# Patient Record
Sex: FEMALE | Race: WHITE | Employment: FULL TIME | URBAN - METROPOLITAN AREA
[De-identification: names, ages, dates, MRNs, and addresses within clinical notes are randomized per-mention and may not be internally consistent; named-entity substitution may affect disease eponyms.]

---

## 2017-01-13 ENCOUNTER — OFFICE VISIT (OUTPATIENT)
Dept: RHEUMATOLOGY | Age: 37
End: 2017-01-13

## 2017-01-13 VITALS
HEART RATE: 74 BPM | HEIGHT: 63 IN | DIASTOLIC BLOOD PRESSURE: 78 MMHG | BODY MASS INDEX: 30.62 KG/M2 | SYSTOLIC BLOOD PRESSURE: 122 MMHG | WEIGHT: 172.8 LBS | OXYGEN SATURATION: 98 % | TEMPERATURE: 98.2 F | RESPIRATION RATE: 16 BRPM

## 2017-01-13 DIAGNOSIS — Z79.1 NSAID LONG-TERM USE: ICD-10-CM

## 2017-01-13 DIAGNOSIS — M25.552 LEFT HIP PAIN: Primary | ICD-10-CM

## 2017-01-13 DIAGNOSIS — Q79.60 EHLERS-DANLOS SYNDROME: ICD-10-CM

## 2017-01-13 DIAGNOSIS — M25.50 PAIN, JOINT, MULTIPLE SITES: ICD-10-CM

## 2017-01-13 DIAGNOSIS — R59.0 ENLARGED LYMPH NODE IN NECK: ICD-10-CM

## 2017-01-13 RX ORDER — SULINDAC 200 MG/1
200 TABLET ORAL
Qty: 60 TAB | Refills: 1 | Status: SHIPPED | OUTPATIENT
Start: 2017-01-13 | End: 2017-02-02 | Stop reason: ALTCHOICE

## 2017-01-13 NOTE — MR AVS SNAPSHOT
Visit Information Date & Time Provider Department Dept. Phone Encounter #  
 1/13/2017  4:00 PM Sharon Romero MD [facility] [id number] Follow-up Instructions Return in about 2 months (around 3/13/2017). Upcoming Health Maintenance Date Due DTaP/Tdap/Td series (1 - Tdap) 10/4/2002 PAP AKA CERVICAL CYTOLOGY 10/4/2002 INFLUENZA AGE 9 TO ADULT 8/1/2016 Allergies as of 1/13/2017  Review Complete On: 1/13/2017 By: Sharon Romero MD  
  
 Severity Noted Reaction Type Reactions Lisinopril  06/18/2014    Swelling Current Immunizations  Reviewed on 11/3/2015 Name Date Influenza Vaccine 10/3/2016, 10/26/2015 Influenza Vaccine Whole 8/23/2010 Not reviewed this visit You Were Diagnosed With   
  
 Codes Comments Left hip pain    -  Primary ICD-10-CM: Z83.045 ICD-9-CM: 719.45 Pain, joint, multiple sites     ICD-10-CM: M25.50 ICD-9-CM: 719.49 Fang-Danlos syndrome     ICD-10-CM: Q79.6 ICD-9-CM: 756.83   
 NSAID long-term use     ICD-10-CM: Z79.1 ICD-9-CM: V58.64 Enlarged lymph node in neck     ICD-10-CM: R59.0 ICD-9-CM: 821. 6 Vitals BP Pulse Temp Resp Height(growth percentile) Weight(growth percentile) 122/78 (BP 1 Location: Right arm, BP Patient Position: Sitting) 74 98.2 °F (36.8 °C) (Oral) 16 5' 3\" (1.6 m) 172 lb 12.8 oz (78.4 kg) LMP SpO2 BMI OB Status Smoking Status 12/23/2016 98% 30.61 kg/m2 Having regular periods Never Smoker Vitals History BMI and BSA Data Body Mass Index Body Surface Area  
 30.61 kg/m 2 1.87 m 2 Preferred Pharmacy Pharmacy Name Phone [address], [address] [place] [address] [place] [phone] Your Updated Medication List  
  
   
This list is accurate as of: 1/13/17  4:41 PM.  Always use your most recent med list.  
  
  
  
  
 albuterol 90 mcg/actuation inhaler Commonly known as:  PROVENTIL HFA, VENTOLIN HFA, PROAIR HFA Take 2 Puffs by inhalation every six (6) hours as needed for Wheezing. ATIVAN 0.5 mg tablet Generic drug:  LORazepam  
Take 0.5 mg by mouth as needed for Anxiety. fexofenadine 180 mg tablet Commonly known as:  Macie Naegeli Take 1 Tab by mouth daily. FIORICET PO Take  by mouth as needed. fluticasone 50 mcg/actuation nasal spray Commonly known as:  Laura Ashford 2 Sprays by Both Nostrils route daily. Indications: ALLERGIC RHINITIS MAXALT PO Take  by mouth.  
  
 metoprolol tartrate 25 mg tablet Commonly known as:  LOPRESSOR Take 1 Tab by mouth two (2) times a day. omeprazole 40 mg capsule Commonly known as:  PRILOSEC Take 40 mg by mouth daily. ORTHO TRI-CYCLEN (28) 0.18/0.215/0.25 mg-35 mcg (28) Tab Generic drug:  norgestimate-ethinyl estradiol Take 1 Tab by mouth daily. sertraline 50 mg tablet Commonly known as:  ZOLOFT Take 50 mg by mouth daily. sulindac 200 mg tablet Commonly known as:  CLINORIL Take 1 Tab by mouth two (2) times daily as needed. verapamil  mg capsule Commonly known as:  VERELAN PM  
Take  by mouth nightly. Prescriptions Sent to Pharmacy Refills  
 sulindac (CLINORIL) 200 mg tablet 1 Sig: Take 1 Tab by mouth two (2) times daily as needed. Class: Normal  
 Pharmacy: [address] [place] [address] Ph #: [phone] Route: Oral  
  
We Performed the Following CBC WITH AUTOMATED DIFF [18429 CPT(R)] METABOLIC PANEL, COMPREHENSIVE [86962 CPT(R)] REFERRAL TO PHYSICAL THERAPY [WKT30 Custom] Comments:  
 Gentle topical analgesia over affected area(s). Stability and strengthening exercises as tolerated. Provide patient with a home education program.  
 SED RATE (ESR) [id number] CPT(R)] Follow-up Instructions Return in about 2 months (around 3/13/2017). To-Do List   
 01/13/2017 Imaging:  XR HIP LT W OR WO PELV 2-3 VWS Referral Information Referral ID Referred By Referred To  
  
 [id number] Moreno Perez, [address] [facility], [address] Phone: [phone] Visits Status Start Date End Date 12 New Request 1/13/17 1/13/18 If your referral has a status of pending review or denied, additional information will be sent to support the outcome of this decision. Patient Instructions PT Sulindac twice daily for 2 weeks, then twice daily if needed (no ibuprofen with this) If locking or clicking of hip persistently, review with Dr. Cary Williamson If persistent presence of lymph node, review with PCP Introducing [address]! Dear Kita Purcell: Thank you for requesting a giddy account. Our records indicate that you already have an active giddy account. You can access your account anytime at [email or web address] Did you know that you can access your hospital and ER discharge instructions at any time in giddy? You can also review all of your test results from your hospital stay or ER visit. Additional Information If you have questions, please visit the Frequently Asked Questions section of the giddy website at [email or web address] Remember, giddy is NOT to be used for urgent needs. For medical emergencies, dial 911. Now available from your iPhone and Android! Please provide this summary of care documentation to your next provider. Your primary care clinician is listed as Maria Antonia Angel. If you have any questions after today's visit, please call [phone].

## 2017-01-13 NOTE — PROGRESS NOTES
HISTORY OF PRESENT ILLNESS  Steven Patton is a 29 y.o. female. HPI Patient presents for follow up of joint pain and [place]. This is her first visit in about one year. \"I'm having a lot of pain. \" Symptoms have been present since February and are worsening since August. She has severe left hip pain (mild right hip pain). . She has pain in the buttocks region bilaterally as well. She has persistent pain in the left foot, as well as pain in the shoulders. She had a left hip injection without benefit. The left hip \"clicks when I walk. \" It is difficult to get comfortable in bed and get out of a chair. She has AM stiffness of 30 minutes. She has no joint swelling. She has taken ibuprofen 600 mg once daily with some relief. Current Outpatient Prescriptions   Medication Sig Dispense Refill    omeprazole (PRILOSEC) 40 mg capsule Take 40 mg by mouth daily.  LORazepam (ATIVAN) 0.5 mg tablet Take 0.5 mg by mouth as needed for Anxiety.  norgestimate-ethinyl estradiol (ORTHO TRI-CYCLEN, 28,) 0.18/0.215/0.25 mg-35 mcg (28) tablet Take 1 Tab by mouth daily.  BUTALB/ACETAMINOPHEN/CAFFEINE (FIORICET PO) Take  by mouth as needed.  sertraline (ZOLOFT) 50 mg tablet Take 50 mg by mouth daily.  RIZATRIPTAN BENZOATE (MAXALT PO) Take  by mouth.  albuterol (PROVENTIL HFA, VENTOLIN HFA, PROAIR HFA) 90 mcg/actuation inhaler Take 2 Puffs by inhalation every six (6) hours as needed for Wheezing. 1 Inhaler 0    verapamil ER (VERELAN PM) 300 mg capsule Take  by mouth nightly.  fluticasone (FLONASE) 50 mcg/actuation nasal spray 2 Sprays by Both Nostrils route daily. Indications: ALLERGIC RHINITIS 1 Bottle 12    fexofenadine (ALLEGRA) 180 mg tablet Take 1 Tab by mouth daily. 30 Tab 12    metoprolol (LOPRESSOR) 25 mg tablet Take 1 Tab by mouth two (2) times a day. 60 Tab 0     Allergies   Allergen Reactions    Lisinopril Swelling       Review of Systems   Constitutional: Negative for fever.    Eyes: Negative for blurred vision. Cardiovascular: Negative for leg swelling. Gastrointestinal: Negative for abdominal pain. Skin: Negative for rash. Physical Exam   Vitals reviewed. Constitutional: She is oriented to person, place, and time. She appears well-developed and well-nourished. No distress. Neck: Normal range of motion. Neck supple. No thyromegaly present. 1-2 mm moveable, tender lymph node right anterior cervical triangle (inferior aspect)  Cardiovascular:   No edema   Abdominal: Soft. She exhibits no distension. There is no tenderness. Musculoskeletal:   -peripheral joints FROM (painful IR, flexion, ER left hip). Thumbs extension to forearm. LS flexion with palms to floor (with pain). -Full peripheral joint examination reveals no synovitis. Tenderness left shoulder, right knee, and over left greater trochanter  Neurological: She is alert and oriented to person, place, and time. She exhibits normal muscle tone.   -gait normal  Skin: Skin is warm and dry. No rash noted. No erythema.   -skin with normal texture   Psychiatric: She has a normal mood and affect.  Judgment normal    Lab Results   Component Value Date/Time    WBC 6.4 11/13/2015 04:34 PM    Hemoglobin (POC) 10.5 11/23/2010 02:35 PM    HGB 13.3 11/13/2015 04:34 PM    Hematocrit (POC) 31 11/23/2010 02:35 PM    HCT 39.8 11/13/2015 04:34 PM    PLATELET 827 12/29/1685 04:34 PM    MCV 86.5 11/13/2015 04:34 PM     Lab Results   Component Value Date/Time    Sodium 138 11/13/2015 04:34 PM    Potassium 3.7 11/13/2015 04:34 PM    Chloride 105 11/13/2015 04:34 PM    CO2 26 11/13/2015 04:34 PM    Anion gap 7 11/13/2015 04:34 PM    Glucose 95 11/13/2015 04:34 PM    BUN 12 11/13/2015 04:34 PM    Creatinine 0.88 11/13/2015 04:34 PM    BUN/Creatinine ratio 14 11/13/2015 04:34 PM    GFR est AA >60 11/13/2015 04:34 PM    GFR est non-AA >60 11/13/2015 04:34 PM    Calcium 8.6 11/13/2015 04:34 PM    Bilirubin, total 0.4 11/13/2015 04:34 PM    ALT 24 11/13/2015 04:34 PM    AST 14 11/13/2015 04:34 PM    Alk. phosphatase 75 11/13/2015 04:34 PM    Protein, total 7.4 11/13/2015 04:34 PM    Albumin 3.8 11/13/2015 04:34 PM    Globulin 3.6 11/13/2015 04:34 PM    A-G Ratio 1.1 11/13/2015 04:34 PM         ASSESSMENT and PLAN    ICD-10-CM ICD-9-CM    1. Left hip pain: noted over the past year. Perhaps some mechanical symptoms. No relief from intra-articular injection. M25.552 719.45 XR HIP LT W OR WO PELV 2-3 VWS  -sulindac as noted below  -if not improving or if significant mechanical symptoms persist, review with Dr. Beti Gar      SED RATE (ESR)      REFERRAL TO PHYSICAL THERAPY   2. Pain, joint, multiple sites: though left hip pain predominates, diffuse pain more prominent of late. No clear synovitis on exam. Joints with FROM. 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 Evaluated at [address] [place] [address] and diagnosed with EDS (non-vascular type). She has previously reported a normal echocardiogram (sees cardiology as well). Q79.6 756.83 REFERRAL TO PHYSICAL THERAPY  Avoid pushing ROM in day-to day or PT exercises (keep joint protection program)   4. NSAID long-term use: intermittent ibuprofen with current RX for sulindac G31.8 M30.88 METABOLIC PANEL, COMPREHENSIVE      CBC WITH AUTOMATED DIFF  -already taking PPI   5. Enlarged lymph node in neck: right sided; recent onset; likely reactive.  R59.0 785.6 -monitor and review with PCP if persistent, more painful, or increasing size of lymph node

## 2017-01-13 NOTE — PATIENT INSTRUCTIONS
PT    Sulindac twice daily for 2 weeks, then twice daily if needed (no ibuprofen with this)    If locking or clicking of hip persistently, review with Dr. Cary Williamson    If persistent presence of lymph node, review with PCP

## 2017-01-14 LAB
ALBUMIN SERPL-MCNC: 4.1 G/DL (ref 3.5–5.5)
ALBUMIN/GLOB SERPL: 1.6 {RATIO} (ref 1.1–2.5)
ALP SERPL-CCNC: 69 IU/L (ref 39–117)
ALT SERPL-CCNC: 14 IU/L (ref 0–32)
AST SERPL-CCNC: 10 IU/L (ref 0–40)
BASOPHILS # BLD AUTO: 0 X10E3/UL (ref 0–0.2)
BASOPHILS NFR BLD AUTO: 0 %
BILIRUB SERPL-MCNC: <0.2 MG/DL (ref 0–1.2)
BUN SERPL-MCNC: 11 MG/DL (ref 6–20)
BUN/CREAT SERPL: 17 (ref 8–20)
CALCIUM SERPL-MCNC: 10.7 MG/DL (ref 8.7–10.2)
CHLORIDE SERPL-SCNC: 99 MMOL/L (ref 96–106)
CO2 SERPL-SCNC: 25 MMOL/L (ref 18–29)
CREAT SERPL-MCNC: 0.65 MG/DL (ref 0.57–1)
EOSINOPHIL # BLD AUTO: 0.1 X10E3/UL (ref 0–0.4)
EOSINOPHIL NFR BLD AUTO: 1 %
ERYTHROCYTE [DISTWIDTH] IN BLOOD BY AUTOMATED COUNT: 12.7 % (ref 12.3–15.4)
ERYTHROCYTE [SEDIMENTATION RATE] IN BLOOD BY WESTERGREN METHOD: 6 MM/HR (ref 0–32)
GLOBULIN SER CALC-MCNC: 2.6 G/DL (ref 1.5–4.5)
GLUCOSE SERPL-MCNC: 92 MG/DL (ref 65–99)
HCT VFR BLD AUTO: 37.7 % (ref 34–46.6)
HGB BLD-MCNC: 12.3 G/DL (ref 11.1–15.9)
IMM GRANULOCYTES # BLD: 0 X10E3/UL (ref 0–0.1)
IMM GRANULOCYTES NFR BLD: 0 %
LYMPHOCYTES # BLD AUTO: 1.6 X10E3/UL (ref 0.7–3.1)
LYMPHOCYTES NFR BLD AUTO: 30 %
MCH RBC QN AUTO: 28.3 PG (ref 26.6–33)
MCHC RBC AUTO-ENTMCNC: 32.6 G/DL (ref 31.5–35.7)
MCV RBC AUTO: 87 FL (ref 79–97)
MONOCYTES # BLD AUTO: 0.5 X10E3/UL (ref 0.1–0.9)
MONOCYTES NFR BLD AUTO: 10 %
NEUTROPHILS # BLD AUTO: 3.1 X10E3/UL (ref 1.4–7)
NEUTROPHILS NFR BLD AUTO: 59 %
PLATELET # BLD AUTO: 237 X10E3/UL (ref 150–379)
POTASSIUM SERPL-SCNC: 4.8 MMOL/L (ref 3.5–5.2)
PROT SERPL-MCNC: 6.7 G/DL (ref 6–8.5)
RBC # BLD AUTO: 4.34 X10E6/UL (ref 3.77–5.28)
SODIUM SERPL-SCNC: 140 MMOL/L (ref 134–144)
WBC # BLD AUTO: 5.4 X10E3/UL (ref 3.4–10.8)

## 2017-01-16 NOTE — PROGRESS NOTES
Calcium slightly elevated. Hip xray unremarkable. Recheck renal panel in 4-6 weeks. Plan otherwise as discussed at visit.

## 2017-01-18 NOTE — PROGRESS NOTES
Called and spoke with Patient. Informed patient of results/instructions. Patient voiced understanding and agreed to follow Dr. Noella Hamman instructions.

## 2017-01-26 ENCOUNTER — APPOINTMENT (OUTPATIENT)
Dept: PHYSICAL THERAPY | Age: 37
End: 2017-01-26
Payer: COMMERCIAL

## 2017-01-30 ENCOUNTER — HOSPITAL ENCOUNTER (OUTPATIENT)
Dept: PHYSICAL THERAPY | Age: 37
Discharge: HOME | End: 2017-01-30
Payer: COMMERCIAL

## 2017-01-30 PROCEDURE — 97110 THERAPEUTIC EXERCISES: CPT

## 2017-01-30 PROCEDURE — 97014 ELECTRIC STIMULATION THERAPY: CPT

## 2017-01-30 PROCEDURE — 97161 PT EVAL LOW COMPLEX 20 MIN: CPT

## 2017-01-30 NOTE — PROGRESS NOTES
PT INITIAL EVALUATION NOTE 2-15    Patient Name: Isra Quintanilla  Date:2017  : 1980  [x]  Patient  Verified  Payor: Gonzalo Biggs / Plan: Melanie Woor / Product Type: PPO /    In time: 2:05 PM  Out time: 3:30 PM  Total Treatment Time (min): 80  Visit #: 1     Treatment Area: Left hip pain [M25.552]  Fang-Danlos syndrome [Q79.6]    SUBJECTIVE  Pain Level (0-10 scale): 4/10  Any medication changes, allergies to medications, adverse drug reactions, diagnosis change, or new procedure performed?: [] No    [x] Yes (see summary sheet for update)  Subjective:     Date of onset/injury: 1 year prior  Date of surgery: NA  Mechanism of injury:  Insidious onset that is progressively getting worse. No known cause  Current symptoms/chief complaint: Discomfort starts in left low back and goes around into groin, then down left buttocks to mid thigh. Pt reports constant aching, with intermittent shooting sensation which started 2 weeks prior. Pt reports no changes her her daily routine over the past year. Pt states she can have a good day with minimal pain, but all it takes is one miss step and the pain significantly increases. Difficulty with sleeping due to pain. Pt reports it's hard to find a comfortable position. She starts with laying on her back which reduces the pain, but is unable to sleep in that position. Pt denies symptoms distal to knee, denies any tingling or numbness in the groin area and no change in bowel/ bladder. Pt reports no known decrease in muscle strength in lower extremity. Pain:   9/10 max 2/10 min 4/10 now     Aggravated by: Squatting, bending over, lifting, incline/ decline steps. Normal house work. Eased by: Ice initially one year prior, didn't help. Rice heat pack for doesn't seem to help. Ibuprofen helps a little. Recently started a new anti-inflammatory that seems to help.      Previous treatment: Pt reports receiving hip steroid injection summer 2016, helped a little but made her more aware of her LBP. Patient states at the same time she had imaging done by Dr. Darryle Beach who told her she has \"shallow sockets. \"     Diagnostic Tests: [] Lab work [x] X-rays    [] CT [] MRI     [] Other:  Results (per report of the patient): 2 weeks prior, hip Xray. Normal findings. PMHX: Significant for Fang-Danlos syndrome, arthritis, depression, high blood pressure, asthma.   and , gallbladder , cardiac ablation  and . 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 Pt reports being required to wear a lead apron for four hours a day, which weighs 10 pounds. Recreation consists of walking at lunch for 20 min. Home life consists of 2 kids, 5 y.o and 10 y.o. Pt reports she is no longer needs to pick them up on a regular basis. Pt reports that in regards to pain, weekends are no better than work days. Prior level of function: General house work pain free. Running pain free. No restrictions at work. Ascend/ descend 10 stairs without discomfort. Patient goal(s): \"Functional activities with less pain. Don't expect pain free. Daily tasks without excruciating pain. Tighten core muscles. Stairs up and down with discomfort. \"        Objective:      Posture:  Lateral Shift: [x] R    [] L    Minor weight shift away from LLE   Kyphosis: [] Increased [x] Decreased   []  WNL  Lordosis:  [x] Increased [] Decreased   [] WNL  Pelvic symmetry: [] WNL    [x] Other: Standing: Left ASIS superior, left PSIS inferior    Functional Biomechanical Screen  SLS: b/l >10 sec w/o LOB. Bilateral Squat: weight shift to right LE. Report of pain increase. Single Limb Squat: b/l moderate dynamic genu valgus. Pain with left squat. Gait: left side trendelenburg sign with right hip drop. Decreased stance time on left side. Right lateral shift.      Active Movements: [] N/A   [] Too acute   [] Other:  LUMB AROM  AROM (%  of normal) Comments:pain, area   Forward flexion  100 P! Through full ROM   Extension  100 P! at end range   Rotation right 100    Rotation left 100    SB right 100 P! On left side   SB left 100 P! On left side            HIP ROM: B/l PROM WNL. Strength:   [] N/A   [] Too acute   [] Other:   L(0-5) R (0-5)   Hip Flexion (L1,2) 5 5   Hip Abduction 4P! 4   Hip ER 4 4   Hip IR nt nt   Hip Extension 5 5   Glut Extension 4P! 4   Knee Extension (L3,4) 5 5   Knee Flexion (S1,2) 5 5   Ankle Dorsiflexion (L4) 5 5   Great Toe Extension (L5) nt nt   Ankle Plantarflexion (S1) nt nt   Ankle Eversion (S1) nt nt     Abdominal brace: decreased abdominal endurance and overall control. Bridge minor hip rotation, minor discomfort reported. Bridge w/ march x2 with moderate hip rotation/ instability observed. Pelvic symmetry supine: Left ASIS superior, left leg shorter                                 Palpation  [] Min  [x] Mod  [] Severe    Location: Left PSIS nodules/ swelling noted   [] Min  [x] Mod  [] Severe    Location: Left SIJ tender to pressure, hypomobile  [] Min  [x] Mod  [] Severe    Location: Left piriformis tightness/ trigger points and increased tone  No pain reported with P-A mobs in Lumbar spine.      Special Tests  Nicolette Test:  [x] Neg    [] Pos  Trendelenberg:  [] Neg    [x] Pos [x] Left    [] Right  OberTest:   [x] Neg    [] Pos  Ely's Test:  [x] Neg    [] Pos  SIJ Provocation: [] Neg    [x] Pos  Hamstring 90/90 Test: [x] Neg    [] Pos    SLR Supine: [] R    [x] L    [] +    [x] -  minor  Crossed SLR  [] R    [] L    [] +    [x] -    Slump Test: [] R    [x] L    [] +    [] -  minor  Nicolette/fig 4 test: [] R    [] L    [] +    [x] -   SI Compress: [] +    [x] -   SI Gapping:  [x] +    [] -   Sacral Thrust: [] R    [x] L    [x] +    [] -   Hip scour: [] R    [x] L    [x] +    [] -   90/90   [] R    [] L    [] +    [x] -   FADIR negative      Outcome Measure: Patient presents with an initial FOTO score of 57. Modality rationale: decrease edema, decrease inflammation, decrease pain, increase tissue extensibility and increase muscle contraction/control to improve the patients ability to General house work pain free. Running pain free. No restrictions at work. Ascend/ descend 10 stairs without discomfort. Min Type Additional Details   12 [x] Estim: []Att   []Unatt        []TENS instruct                  [x]IFC  []Premod   []NMES                     []Other:  []w/US   [x]w/ice   []w/heat  Position: sidelying  Location: left SIJ    []  Traction: [] Cervical       []Lumbar                       [] Prone          []Supine                       []Intermittent   []Continuous Lbs:  [] before manual  [] after manual  []w/heat    []  Ultrasound: []Continuous   [] Pulsed at:                            []1MHz   []3MHz Location:  W/cm2:    []  Paraffin         Location:  []w/heat    []  Ice     []  Heat  []  Ice massage Position:  Location:    []  Laser  []  Other: Position:  Location:    []  Vasopneumatic Device Pressure:       [] lo [] med [] hi   Temperature:    [x] Skin assessment post-treatment:  [x]intact []redness- no adverse reaction    []redness  adverse reaction:     10 min Therapeutic Exercise:  [x] See flow sheet : Pt instructed in HEP and able to return demonstration. Corrected left pelvic posterior rotation with MET. Rationale: increase ROM, increase strength, improve coordination, improve balance and increase proprioception to improve the patients ability to General house work pain free. Running pain free. No restrictions at work. Ascend/ descend 10 stairs without discomfort. With   [x] TE   [] TA   [] neuro   [] other: Patient Education: [] Review HEP    [] Progressed/Changed HEP based on:   [x] positioning   [x] body mechanics   [x] transfers   [x] heat/ice application    [x] other: Pt encouraged to avoid crossing legs.  Educated on proper sitting/ desk ergonomics.         Other Objective/Functional Measures: nt    Pain Level (0-10 scale) post treatment: 2/10      ASSESSMENT:      [x]  See Plan of Care      Dayton Jarrett 1/30/2017  4:00 PM

## 2017-02-01 RX ORDER — METHOCARBAMOL 500 MG/1
TABLET, FILM COATED ORAL
Qty: 30 TAB | Refills: 0 | Status: SHIPPED | OUTPATIENT
Start: 2017-02-01 | End: 2017-06-03

## 2017-02-01 RX ORDER — METHYLPREDNISOLONE 4 MG/1
TABLET ORAL
Qty: 1 DOSE PACK | Refills: 0 | Status: SHIPPED | OUTPATIENT
Start: 2017-02-01 | End: 2017-06-03

## 2017-02-02 ENCOUNTER — OFFICE VISIT (OUTPATIENT)
Dept: FAMILY MEDICINE CLINIC | Age: 37
End: 2017-02-02

## 2017-02-02 VITALS
OXYGEN SATURATION: 99 % | BODY MASS INDEX: 30.48 KG/M2 | TEMPERATURE: 98.4 F | HEART RATE: 81 BPM | DIASTOLIC BLOOD PRESSURE: 68 MMHG | HEIGHT: 63 IN | RESPIRATION RATE: 18 BRPM | WEIGHT: 172 LBS | SYSTOLIC BLOOD PRESSURE: 109 MMHG

## 2017-02-02 DIAGNOSIS — R68.83 CHILLS: ICD-10-CM

## 2017-02-02 DIAGNOSIS — R11.0 NAUSEA: Primary | ICD-10-CM

## 2017-02-02 LAB
FLUAV+FLUBV AG NOSE QL IA.RAPID: NEGATIVE POS/NEG
FLUAV+FLUBV AG NOSE QL IA.RAPID: NEGATIVE POS/NEG
VALID INTERNAL CONTROL?: YES

## 2017-02-02 NOTE — PROGRESS NOTES
Chief Complaint   Patient presents with    Cold Symptoms     nausea, chills and body aches for 1 day, has taken Tylenol

## 2017-02-02 NOTE — PROGRESS NOTES
Subjective:   Michelle Nicole is a 29 y.o. female who complains of myalgias, chills, and nausea for 1 day, stable since that time. She came here at the recommendation of her supervisor to be evaluated for flu. She denies any sore throat, sneezing, runny nose, cough, or headache. Ears are her normal baseline. History of IBS - has couple weeks of diarrhea then constipation. Has recently had constipation but did have small normal BM last night and this morning. Her  recently had a 24 hours stomach illness w/ vomiting and diarrhea. She has had a flu vaccine this season. She denies a history of fevers, shortness of breath and wheezing. Evaluation to date: none. Treatment to date: OTC products. Patient does not smoke cigarettes. 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     Unspecified adverse effect of anesthesia      with C- section the spinal did not take and had to be put to sleep    Palacios-Parkinson-White syndrome     Marie-Parkinson-White syndrome      Past Surgical History   Procedure Laterality Date    Hx cholecystectomy      Pr  delivery only      Hx tonsillectomy   tonsils    Hx heent  As Child     \"tubes in each ear\" as child    Hx svt ablation       ,     Hx  section       c section x 2     Allergies   Allergen Reactions    Lisinopril Swelling       Review of Systems  Pertinent items are noted in HPI. Objective:     Visit Vitals    /68    Pulse 81    Temp 98.4 °F (36.9 °C) (Oral)    Resp 18    Ht 5' 3\" (1.6 m)    Wt 172 lb (78 kg)    LMP 2016 (Exact Date)    SpO2 99%    BMI 30.47 kg/m2     General:  alert, cooperative, no distress but sickly appearance   Eyes: negative   Ears: normal TM's and external ear canals AU   Sinuses: Normal paranasal sinuses without tenderness   Mouth:  Lips, mucosa, and tongue normal. Teeth and gums normal and normal findings: oropharynx pink & moist without lesions or evidence of thrush   Neck: supple, symmetrical, trachea midline and no adenopathy. Heart: S1 and S2 normal, no murmurs noted. Lungs: clear to auscultation bilaterally   Abdomen: soft, non-tender. Bowel sounds normal. No masses,  no organomegaly        Results for orders placed or performed in visit on 17   AMB POC NENITA INFLUENZA A/B TEST   Result Value Ref Range    VALID INTERNAL CONTROL POC Yes     Influenza A Ag POC Negative Negative Pos/Neg    Influenza B Ag POC Negative Negative Pos/Neg       Assessment/Plan:       ICD-10-CM ICD-9-CM    1. Nausea R11.0 787.02 AMB POC NENITA INFLUENZA A/B TEST   2. Chills R68.83 780.64      Pt declines antiemetic due to concerns for constipation. She is going home for the day. Clear liquids and advance slowly to bland diet. Suggested symptomatic OTC remedies. RTC prn.     Cristina Bonilla NP

## 2017-02-02 NOTE — MR AVS SNAPSHOT
Visit Information Date & Time Provider Department Dept. Phone Encounter #  
 2/2/2017  9:45 AM Dorothy Acosta NP [address] [facility] [phone] [id number] Follow-up Instructions Return if symptoms worsen or fail to improve. Your Appointments 3/15/2017  4:00 PM  
ESTABLISHED PATIENT with Kristy Oscar MD  
Arthritis and [facility]) Appt Note: fu 2 mo  
 [address] [facility] [address]  
[phone]  
  
   
 [address] [phone] Upcoming Health Maintenance Date Due DTaP/Tdap/Td series (1 - Tdap) 10/4/2002 PAP AKA CERVICAL CYTOLOGY 10/4/2002 Allergies as of 2/2/2017  Review Complete On: 2/2/2017 By: Dorothy Acosta NP Severity Noted Reaction Type Reactions Lisinopril  06/18/2014    Swelling Current Immunizations  Reviewed on 11/3/2015 Name Date Influenza Vaccine 10/3/2016, 10/26/2015 Influenza Vaccine Whole 8/23/2010 Not reviewed this visit You Were Diagnosed With   
  
 Codes Comments Nausea    -  Primary ICD-10-CM: R11.0 ICD-9-CM: 787.02 Chills     ICD-10-CM: R68.83 ICD-9-CM: 780.64 Vitals BP Pulse Temp Resp Height(growth percentile) Weight(growth percentile) 109/68 81 98.4 °F (36.9 °C) (Oral) 18 5' 3\" (1.6 m) 172 lb (78 kg) LMP SpO2 BMI OB Status Smoking Status 01/25/2016 (Exact Date) 99% 30.47 kg/m2 Having regular periods Never Smoker Vitals History BMI and BSA Data Body Mass Index Body Surface Area  
 30.47 kg/m 2 1.86 m 2 Preferred Pharmacy Pharmacy Name Phone [facility] [phone] Your Updated Medication List  
  
   
This list is accurate as of: 2/2/17 10:26 AM.  Always use your most recent med list.  
  
  
  
  
 albuterol 90 mcg/actuation inhaler Commonly known as:  PROVENTIL HFA, VENTOLIN HFA, PROAIR HFA  
 Take 2 Puffs by inhalation every six (6) hours as needed for Wheezing. ATIVAN 0.5 mg tablet Generic drug:  LORazepam  
Take 0.5 mg by mouth as needed for Anxiety. fexofenadine 180 mg tablet Commonly known as:  Pamela Holts Take 1 Tab by mouth daily. FIORICET PO Take  by mouth as needed. fluticasone 50 mcg/actuation nasal spray Commonly known as:  Alicia Zelaya 2 Sprays by Both Nostrils route daily. Indications: ALLERGIC RHINITIS MAXALT PO Take  by mouth. methocarbamol 500 mg tablet Commonly known as:  ROBAXIN  
500 mg at bedtime as needed for muscle pain. May make drowsy. Do not take with other muscle relaxants. methylPREDNISolone 4 mg tablet Commonly known as:  Dairl Leah Take each day's dose in AM with food prn joint pain. Do not take with NSAIDS  
  
 metoprolol tartrate 25 mg tablet Commonly known as:  LOPRESSOR Take 1 Tab by mouth two (2) times a day. omeprazole 40 mg capsule Commonly known as:  PRILOSEC Take 40 mg by mouth daily. ORTHO TRI-CYCLEN (28) 0.18/0.215/0.25 mg-35 mcg (28) Tab Generic drug:  norgestimate-ethinyl estradiol Take 1 Tab by mouth daily. sertraline 50 mg tablet Commonly known as:  ZOLOFT Take 50 mg by mouth daily. verapamil  mg capsule Commonly known as:  VERELAN PM  
Take  by mouth nightly. We Performed the Following AMB POC NENITA INFLUENZA A/B TEST [36291 CPT(R)] Follow-up Instructions Return if symptoms worsen or fail to improve. Patient Instructions Nausea and Vomiting: Care Instructions Your Care Instructions When you are nauseated, you may feel weak and sweaty and notice a lot of saliva in your mouth. Nausea often leads to vomiting. Most of the time you do not need to worry about nausea and vomiting, but they can be signs of other illnesses.  
Two common causes of nausea and vomiting are stomach flu and food poisoning. Nausea and vomiting from viral stomach flu will usually start to improve within 24 hours. Nausea and vomiting from food poisoning may last from 12 to 48 hours. The doctor has checked you carefully, but problems can develop later. If you notice any problems or new symptoms, get medical treatment right away. Follow-up care is a key part of your treatment and safety. Be sure to make and go to all appointments, and call your doctor if you are having problems. It's also a good idea to know your test results and keep a list of the medicines you take. How can you care for yourself at home? · To prevent dehydration, drink plenty of fluids, enough so that your urine is light yellow or clear like water. Choose water and other caffeine-free clear liquids until you feel better. If you have kidney, heart, or liver disease and have to limit fluids, talk with your doctor before you increase the amount of fluids you drink. · Rest in bed until you feel better. · When you are able to eat, try clear soups, mild foods, and liquids until all symptoms are gone for 12 to 48 hours. Other good choices include dry toast, crackers, cooked cereal, and gelatin dessert, such as Jell-O. When should you call for help? Call 911 anytime you think you may need emergency care. For example, call if: 
· You passed out (lost consciousness). Call your doctor now or seek immediate medical care if: 
· You have symptoms of dehydration, such as: ¨ Dry eyes and a dry mouth. ¨ Passing only a little dark urine. ¨ Feeling thirstier than usual. 
· You have new or worsening belly pain. · You have a new or higher fever. · You vomit blood or what looks like coffee grounds. Watch closely for changes in your health, and be sure to contact your doctor if: 
· You have ongoing nausea and vomiting. · Your vomiting is getting worse. · Your vomiting lasts longer than 2 days. · You are not getting better as expected. Where can you learn more? Go to http://dorita-nara.info/. Enter 25 [id number] in the search box to learn more about \"Nausea and Vomiting: Care Instructions. \" Current as of: May 27, 2016 Content Version: 11.1 © 6589-1163 [employer]. Care instructions adapted under license by [employer] (which disclaims liability or warranty for this information). If you have questions about a medical condition or this instruction, always ask your healthcare professional. [address] any warranty or liability for your use of this information. Introducing [facility] & HEALTH SERVICES! Dear Stu Wilkins: Thank you for requesting a [employer] account. Our records indicate that you already have an active [employer] account. You can access your account anytime at [email or web address] Did you know that you can access your hospital and ER discharge instructions at any time in [employer]? You can also review all of your test results from your hospital stay or ER visit. Additional Information If you have questions, please visit the Frequently Asked Questions section of the [employer] website at [email or web address] Remember, [employer] is NOT to be used for urgent needs. For medical emergencies, dial 911. Now available from your iPhone and Android! Please provide this summary of care documentation to your next provider. Your primary care clinician is listed as Kamran Logan. If you have any questions after today's visit, please call [phone].

## 2017-02-08 DIAGNOSIS — M25.552 LEFT HIP PAIN: Primary | ICD-10-CM

## 2017-02-09 LAB
ALBUMIN SERPL-MCNC: 4.2 G/DL (ref 3.5–5.5)
ALBUMIN/GLOB SERPL: 1.9 {RATIO} (ref 1.1–2.5)
ALP SERPL-CCNC: 61 IU/L (ref 39–117)
ALT SERPL-CCNC: 22 IU/L (ref 0–32)
AST SERPL-CCNC: 12 IU/L (ref 0–40)
BASOPHILS # BLD AUTO: 0 X10E3/UL (ref 0–0.2)
BASOPHILS NFR BLD AUTO: 0 %
BILIRUB SERPL-MCNC: <0.2 MG/DL (ref 0–1.2)
BUN SERPL-MCNC: 12 MG/DL (ref 6–20)
BUN/CREAT SERPL: 18 (ref 8–20)
CALCIUM SERPL-MCNC: 9.3 MG/DL (ref 8.7–10.2)
CHLORIDE SERPL-SCNC: 98 MMOL/L (ref 96–106)
CO2 SERPL-SCNC: 26 MMOL/L (ref 18–29)
CREAT SERPL-MCNC: 0.67 MG/DL (ref 0.57–1)
EOSINOPHIL # BLD AUTO: 0.1 X10E3/UL (ref 0–0.4)
EOSINOPHIL NFR BLD AUTO: 1 %
ERYTHROCYTE [DISTWIDTH] IN BLOOD BY AUTOMATED COUNT: 12.6 % (ref 12.3–15.4)
ERYTHROCYTE [SEDIMENTATION RATE] IN BLOOD BY WESTERGREN METHOD: 2 MM/HR (ref 0–32)
GLOBULIN SER CALC-MCNC: 2.2 G/DL (ref 1.5–4.5)
GLUCOSE SERPL-MCNC: 85 MG/DL (ref 65–99)
HCT VFR BLD AUTO: 38.5 % (ref 34–46.6)
HGB BLD-MCNC: 12.1 G/DL (ref 11.1–15.9)
IMM GRANULOCYTES # BLD: 0 X10E3/UL (ref 0–0.1)
IMM GRANULOCYTES NFR BLD: 0 %
LYMPHOCYTES # BLD AUTO: 2.3 X10E3/UL (ref 0.7–3.1)
LYMPHOCYTES NFR BLD AUTO: 38 %
MCH RBC QN AUTO: 28.3 PG (ref 26.6–33)
MCHC RBC AUTO-ENTMCNC: 31.4 G/DL (ref 31.5–35.7)
MCV RBC AUTO: 90 FL (ref 79–97)
MONOCYTES # BLD AUTO: 0.4 X10E3/UL (ref 0.1–0.9)
MONOCYTES NFR BLD AUTO: 7 %
NEUTROPHILS # BLD AUTO: 3.3 X10E3/UL (ref 1.4–7)
NEUTROPHILS NFR BLD AUTO: 54 %
PLATELET # BLD AUTO: 234 X10E3/UL (ref 150–379)
POTASSIUM SERPL-SCNC: 4.2 MMOL/L (ref 3.5–5.2)
PROT SERPL-MCNC: 6.4 G/DL (ref 6–8.5)
RBC # BLD AUTO: 4.27 X10E6/UL (ref 3.77–5.28)
SODIUM SERPL-SCNC: 140 MMOL/L (ref 134–144)
WBC # BLD AUTO: 6.1 X10E3/UL (ref 3.4–10.8)

## 2017-02-20 ENCOUNTER — HOSPITAL ENCOUNTER (OUTPATIENT)
Dept: PHYSICAL THERAPY | Age: 37
Discharge: HOME | End: 2017-02-20
Payer: COMMERCIAL

## 2017-02-20 PROCEDURE — 97110 THERAPEUTIC EXERCISES: CPT | Performed by: PHYSICAL THERAPY ASSISTANT

## 2017-02-20 PROCEDURE — 97140 MANUAL THERAPY 1/> REGIONS: CPT | Performed by: PHYSICAL THERAPY ASSISTANT

## 2017-02-20 PROCEDURE — 97014 ELECTRIC STIMULATION THERAPY: CPT | Performed by: PHYSICAL THERAPY ASSISTANT

## 2017-02-20 NOTE — PROGRESS NOTES
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 \" \" I've gained 15 pounds in the past year and I'm frustrated at that, I can't exercise because of the pain. \"    OBJECTIVE    Modality rationale: decrease inflammation, decrease pain and increase tissue extensibility to improve the patients ability to General house work pain free. Running pain free. No restrictions at work. Ascend/ descend 10 stairs without discomfort.     Min Type Additional Details   15 [x] Estim: []Att   [x]Unatt        []TENS instruct                  [x]IFC  []Premod   []NMES                     []Other:  []w/US   [x]w/ice   []w/heat  Position:  Location:    []  Traction: [] Cervical       []Lumbar                       [] Prone          []Supine                       []Intermittent   []Continuous Lbs:  [] before manual  [] after manual  []w/heat    []  Ultrasound: []Continuous   [] Pulsed at:                            []1MHz   []3MHz Location:  W/cm2:    []  Paraffin         Location:  []w/heat    []  Ice     []  Heat  []  Ice massage Position:  Location:    []  Laser  []  Other: Position:  Location:    []  Vasopneumatic Device Pressure:       [] lo [] med [] hi   Temperature:    [x] Skin assessment post-treatment: [x]intact []redness- no adverse reaction    []redness  adverse reaction:     30 min Therapeutic Exercise:  [x] See flow sheet : reviewed HEP, added s/l clamshells with TrA brace   Rationale: increase strength and improve coordination to improve the patients ability to General house work pain free. Running pain free. No restrictions at work. Ascend/ descend 10 stairs without discomfort. 15 min Manual Therapy:  MET to correct L posterior ilial rotation, MET to balance pelvis. STM/TPR L piriformis and glut med muscles in R s/l    Rationale: decrease pain, increase ROM, increase tissue extensibility and decrease trigger points  to improve the patients ability to General house work pain free. Running pain free. No restrictions at work. Ascend/ descend 10 stairs without discomfort. With   [x] TE   [] TA   [] neuro   [] other: Patient Education: [x] Review HEP    [] Progressed/Changed HEP based on:   [] positioning   [] body mechanics   [] transfers   [x] heat/ice application    [] other:      Other Objective/Functional Measures: Severe TTP L piriformis and glut med muscles     Pain Level (0-10 scale) post treatment: 0/10    ASSESSMENT/Changes in Function:   Reviewed HEP and corrections made. Continues to have posterior rotated L ilium but unable to fully resolve due to instability and core/hip weakness but patient reporting relief after MET correction. Overall tolerated session well. Patient will continue to benefit from skilled PT services to modify and progress therapeutic interventions, address functional mobility deficits, address ROM deficits, address strength deficits, analyze and cue movement patterns and instruct in home and community integration to attain remaining goals.      []  See Plan of Care  []  See progress note/recertification  []  See Discharge Summary         Progress towards goals / Updated goals:  nt    PLAN  []  Upgrade activities as tolerated     [x]  Continue plan of care  [] Update interventions per flow sheet       []  Discharge due to:_  []  Other:_      Rochelle Mcelroy PTA 2/20/2017  5:35 PM

## 2017-02-22 ENCOUNTER — HOSPITAL ENCOUNTER (OUTPATIENT)
Dept: PHYSICAL THERAPY | Age: 37
Discharge: HOME | End: 2017-02-22
Payer: COMMERCIAL

## 2017-02-22 PROCEDURE — 97014 ELECTRIC STIMULATION THERAPY: CPT | Performed by: PHYSICAL THERAPY ASSISTANT

## 2017-02-22 PROCEDURE — 97140 MANUAL THERAPY 1/> REGIONS: CPT | Performed by: PHYSICAL THERAPY ASSISTANT

## 2017-02-22 PROCEDURE — 97110 THERAPEUTIC EXERCISES: CPT | Performed by: PHYSICAL THERAPY ASSISTANT

## 2017-02-22 NOTE — PROGRESS NOTES
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 \" States woke up yesterday morning with sharp pain radiating down L buttock region \"That was new for me. \" States she took pain medication last night before bed, applied ice which allowed her to sleep. Reports sleep improved after taking recommendation of using a pillow in R s/l under R ribs. OBJECTIVE    Modality rationale: decrease inflammation, decrease pain and increase tissue extensibility to improve the patients ability to General house work pain free. Running pain free. No restrictions at work. Ascend/ descend 10 stairs without discomfort.     Min Type Additional Details   15 [x] Estim: []Att   [x]Unatt        []TENS instruct                  [x]IFC  []Premod   []NMES                     []Other:  []w/US   [x]w/ice   []w/heat  Position:  Location:    []  Traction: [] Cervical       []Lumbar                       [] Prone          []Supine                       []Intermittent   []Continuous Lbs:  [] before manual  [] after manual  []w/heat    []  Ultrasound: []Continuous   [] Pulsed at:                            []1MHz   []3MHz Location:  W/cm2:    []  Paraffin         Location:  []w/heat    []  Ice     []  Heat  []  Ice massage Position:  Location:    []  Laser  []  Other: Position:  Location:    []  Vasopneumatic Device Pressure:       [] lo [] med [] hi   Temperature:    [x] Skin assessment post-treatment:  [x]intact []redness- no adverse reaction    []redness  adverse reaction:     40 min Therapeutic Exercise:  [x] See flow sheet : added TrA brace with SKFO, Theraband shoulder row, ext and flex with TrA brace. Rationale: increase strength and improve coordination to improve the patients ability to General house work pain free. Running pain free. No restrictions at work. Ascend/ descend 10 stairs without discomfort. 15 min Manual Therapy:  MET to correct L posterior ilial rotation, MET to balance pelvis. STM/TPR L piriformis and glut med muscles in R s/l    Rationale: decrease pain, increase ROM, increase tissue extensibility and decrease trigger points  to improve the patients ability to General house work pain free. Running pain free. No restrictions at work. Ascend/ descend 10 stairs without discomfort. With   [x] TE   [] TA   [] neuro   [] other: Patient Education: [x] Review HEP    [] Progressed/Changed HEP based on:   [] positioning   [] body mechanics   [] transfers   [x] heat/ice application    [] other:      Other Objective/Functional Measures: Mod-Severe TTP L piriformis and glut med muscles     Pain Level (0-10 scale) post treatment: 0/10    ASSESSMENT/Changes in Function:   Challenged with TrA brace with SKFO L>R. Minor cues to maintain neutral lumbar spine during Theraband shoulder series with TrA brace. No reports of pain during exercises. Overall tolerated session well. Patient will continue to benefit from skilled PT services to modify and progress therapeutic interventions, address functional mobility deficits, address ROM deficits, address strength deficits, analyze and cue movement patterns and instruct in home and community integration to attain remaining goals.      []  See Plan of Care  []  See progress note/recertification  []  See Discharge Summary         Progress towards goals / Updated goals:  nt    PLAN  []  Upgrade activities as tolerated     [x]  Continue plan of care  []  Update interventions per flow sheet       []  Discharge due to:_  []  Other:_      Lanette Kay PTA 2/22/2017  4:30 PM

## 2017-02-27 ENCOUNTER — HOSPITAL ENCOUNTER (OUTPATIENT)
Dept: PHYSICAL THERAPY | Age: 37
Discharge: HOME | End: 2017-02-27
Payer: COMMERCIAL

## 2017-02-27 PROCEDURE — 97110 THERAPEUTIC EXERCISES: CPT | Performed by: PHYSICAL THERAPY ASSISTANT

## 2017-02-27 PROCEDURE — 97014 ELECTRIC STIMULATION THERAPY: CPT | Performed by: PHYSICAL THERAPY ASSISTANT

## 2017-02-27 NOTE — PROGRESS NOTES
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 \" Denies increased pain with activity. States she still has SI belt that she purchased last time in PT but has not tried wearing it. She followed up with ortho. specialist; possible L Hip labral tear \"He wants to do an MRI to be sure but thinks because I have shallow sockets, that's where the tear came from. I'm hoping he's wrong. \" States if she has a labral tear she would also need Osteotomy of L hip. OBJECTIVE    Modality rationale: decrease inflammation, decrease pain and increase tissue extensibility to improve the patients ability to General house work pain free. Running pain free. No restrictions at work. Ascend/ descend 10 stairs without discomfort.     Min Type Additional Details   15 [x] Estim: []Att   [x]Unatt        []TENS instruct                  [x]IFC  []Premod   []NMES                     []Other:  []w/US   [x]w/ice   []w/heat  Position:  Location:    []  Traction: [] Cervical       []Lumbar                       [] Prone          []Supine                       []Intermittent   []Continuous Lbs:  [] before manual  [] after manual  []w/heat    []  Ultrasound: []Continuous   [] Pulsed at:                            []1MHz   []3MHz Location:  W/cm2:    [] Paraffin         Location:  []w/heat    []  Ice     []  Heat  []  Ice massage Position:  Location:    []  Laser  []  Other: Position:  Location:    []  Vasopneumatic Device Pressure:       [] lo [] med [] hi   Temperature:    [x] Skin assessment post-treatment:  [x]intact []redness- no adverse reaction    []redness  adverse reaction:     60 min Therapeutic Exercise:  [x] See flow sheet : reassessment performed    Rationale: increase strength and improve coordination to improve the patients ability to General house work pain free. Running pain free. No restrictions at work. Ascend/ descend 10 stairs without discomfort. min Manual Therapy:  MET to correct L posterior ilial rotation, MET to balance pelvis. STM/TPR L piriformis and glut med muscles in R s/l    Rationale: decrease pain, increase ROM, increase tissue extensibility and decrease trigger points  to improve the patients ability to General house work pain free. Running pain free. No restrictions at work. Ascend/ descend 10 stairs without discomfort. With   [x] TE   [] TA   [] neuro   [] other: Patient Education: [x] Review HEP    [] Progressed/Changed HEP based on:   [] positioning   [] body mechanics   [] transfers   [x] heat/ice application    [] other:      Other Objective/Functional Measures: Active Movements: [] N/A [] Too acute [] Other:  LUMB AROM AROM (% of normal) Comments:pain, area   Forward flexion  100 P! Through full ROM   Extension  100    Rotation right 100     Rotation left 100     SB right 100 P! On left side   SB left 100 P!  On left side            Strength: [] N/A [] Too acute [] Other:    L(0-5) R (0-5)   Hip Flexion (L1,2) 5 5   Hip Abduction 4P! 4   Hip ER 4 4   Hip IR nt nt   Hip Extension 5 5   Glut Extension 4P! 4     Abdominal brace: good sequencing, fair endurance   Bridge good mobility, fair stability  Bridge w/ march x2 with moderate hip rotation/ instability observed.        Palpation  [] Min [x] Mod [] Severe Location: Left PSIS   [] Min [] Mod [x] Severe Location: Left SIJ tender to pressure,  [] Min [x] Mod [x] Severe Location: Left piriformis/glut        Pain Level (0-10 scale) post treatment: 0/10    ASSESSMENT/Changes in Function:   Patient has been seen for 4 visits since 1/30/17 due to recent flare up with EDS and arthritis. She continues to have relatively elevated pain levels . She is compliant with her HEP and ice/heat application. She did not have any change in MMT strength along b/l hips and noted increased TTP L piriformis and glut region compared to initial evaluation. We have reviewed postural principles as well as use of SI belt to provide stability during prolonged standing and increased activity. Patient to have MRI of L hip and have follow up with Ortho. MD regarding possible surgery. Plan to continue PT to improve core and hip strength unless otherwise stated by MD.       Patient will continue to benefit from skilled PT services to modify and progress therapeutic interventions, address functional mobility deficits, address ROM deficits, address strength deficits, analyze and cue movement patterns and instruct in home and community integration to attain remaining goals. []  See Plan of Care  []  See progress note/recertification  []  See Discharge Summary         Progress towards goals / Updated goals:  nt    PLAN  []  Upgrade activities as tolerated     [x]  Continue plan of care  []  Update interventions per flow sheet       []  Discharge due to:_  [x]  Other: continue with core/lumbar strengthening per POC or per recommendations from MD, assess goals next visit.       Rochelle Mcelroy, PTA 2/27/2017  4:30 PM

## 2017-03-01 ENCOUNTER — APPOINTMENT (OUTPATIENT)
Dept: PHYSICAL THERAPY | Age: 37
End: 2017-03-01
Payer: COMMERCIAL

## 2017-03-03 ENCOUNTER — HOSPITAL ENCOUNTER (OUTPATIENT)
Dept: MRI IMAGING | Age: 37
Discharge: HOME | End: 2017-03-03
Attending: ORTHOPAEDIC SURGERY
Payer: COMMERCIAL

## 2017-03-03 DIAGNOSIS — S73.192D TEAR OF LEFT ACETABULAR LABRUM, SUBSEQUENT ENCOUNTER: ICD-10-CM

## 2017-03-03 PROCEDURE — 73721 MRI JNT OF LWR EXTRE W/O DYE: CPT

## 2017-03-06 ENCOUNTER — HOSPITAL ENCOUNTER (OUTPATIENT)
Dept: PHYSICAL THERAPY | Age: 37
Discharge: HOME | End: 2017-03-06
Payer: COMMERCIAL

## 2017-03-06 PROCEDURE — 97140 MANUAL THERAPY 1/> REGIONS: CPT | Performed by: PHYSICAL THERAPY ASSISTANT

## 2017-03-06 PROCEDURE — 97110 THERAPEUTIC EXERCISES: CPT | Performed by: PHYSICAL THERAPY ASSISTANT

## 2017-03-06 PROCEDURE — 97014 ELECTRIC STIMULATION THERAPY: CPT | Performed by: PHYSICAL THERAPY ASSISTANT

## 2017-03-06 NOTE — PROGRESS NOTES
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 My kids had basketball games and we were on the go. \" states no increase in pain over the weekend. OBJECTIVE    Modality rationale: decrease inflammation, decrease pain and increase tissue extensibility to improve the patients ability to General house work pain free. Running pain free. No restrictions at work. Ascend/ descend 10 stairs without discomfort.     Min Type Additional Details   15 [x] Estim: []Att   [x]Unatt        []TENS instruct                  [x]IFC  []Premod   []NMES                     []Other:  []w/US   [x]w/ice   []w/heat  Position:  Location:    []  Traction: [] Cervical       []Lumbar                       [] Prone          []Supine                       []Intermittent   []Continuous Lbs:  [] before manual  [] after manual  []w/heat    []  Ultrasound: []Continuous   [] Pulsed at:                            []1MHz   []3MHz Location:  W/cm2:    []  Paraffin         Location:  []w/heat    []  Ice     []  Heat  []  Ice massage Position:  Location:    []  Laser  []  Other: Position:  Location:    []  Vasopneumatic Device Pressure:       [] lo [] med [] hi   Temperature:    [x] Skin assessment post-treatment:  [x]intact []redness- no adverse reaction    []redness  adverse reaction:     45 min Therapeutic Exercise:  [x] See flow sheet : added lateral step at stairs with TrA brace, quadruped alt UE flexion   Rationale: increase strength and improve coordination to improve the patients ability to General house work pain free. Running pain free. No restrictions at work. Ascend/ descend 10 stairs without discomfort. 20 min Manual Therapy:  MET to correct L posterior ilial rotation, MET to balance pelvis. STM/TPR L piriformis and glut med muscles in R s/l    Rationale: decrease pain, increase ROM, increase tissue extensibility and decrease trigger points  to improve the patients ability to General house work pain free. Running pain free. No restrictions at work. Ascend/ descend 10 stairs without discomfort. With   [x] TE   [] TA   [] neuro   [] other: Patient Education: [x] Review HEP    [] Progressed/Changed HEP based on:   [] positioning   [] body mechanics   [] transfers   [x] heat/ice application    [] other:      Other Objective/Functional Measures: mod TTP L piriformis, glut med muscles    Pain Level (0-10 scale) post treatment: 0/10    ASSESSMENT/Changes in Function:   Patient with increased hip pain during clamshells so held today. Able to perform lateral step and quadruped UE flexion without pain. Improved soft tissue mobility and decreased TTP along posterior hip musculature. Patient will continue to benefit from skilled PT services to modify and progress therapeutic interventions, address functional mobility deficits, address ROM deficits, address strength deficits, analyze and cue movement patterns and instruct in home and community integration to attain remaining goals.      []  See Plan of Care  []  See progress note/recertification  []  See Discharge Summary         Progress towards goals / Updated goals:  nt     PLAN  []  Upgrade activities as tolerated     [x]  Continue plan of care  []  Update interventions per flow sheet       []  Discharge due to:_  [x]  Other: continue with core/lumbar strengthening per POC or per recommendations from MD, assess goals next visit.   Patient to inform therapist plan after discussing MRI results with MD.    Rani Zamora, LISA 3/6/2017  4:25 PM

## 2017-03-08 ENCOUNTER — HOSPITAL ENCOUNTER (OUTPATIENT)
Dept: PHYSICAL THERAPY | Age: 37
Discharge: HOME | End: 2017-03-08
Payer: COMMERCIAL

## 2017-03-08 PROCEDURE — 97140 MANUAL THERAPY 1/> REGIONS: CPT | Performed by: PHYSICAL THERAPY ASSISTANT

## 2017-03-08 PROCEDURE — 97014 ELECTRIC STIMULATION THERAPY: CPT | Performed by: PHYSICAL THERAPY ASSISTANT

## 2017-03-08 NOTE — PROGRESS NOTES
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 States yesterday she was in excruciating pain at the end of the day \"They are short staffed and I had to stand on my 5 out of 8 hours wearing the xray vest. I think that's what made my back and hip hurt so bad. \"States she couldn't even kneel to give her kids a bath last night because the pain was so bad. She to an 800 mg ibuprofen \"which didn't even touch it. \" She called Dr. Marina Herring to see if she could get an appointment and he said she should contact her Orthopedist. She has an appointment on 3/13/17 to discuss MRI results. OBJECTIVE    Modality rationale: decrease inflammation, decrease pain and increase tissue extensibility to improve the patients ability to General house work pain free. Running pain free. No restrictions at work. Ascend/ descend 10 stairs without discomfort.     Min Type Additional Details   15 [x] Estim: []Att   [x]Unatt        []TENS instruct                  [x]IFC  []Premod   []NMES                     []Other:  []w/US   []w/ice   [x]w/heat  Position: R s/l  Location: L SI joint    []  Traction: [] Cervical       []Lumbar                       [] Prone          []Supine                       []Intermittent   []Continuous Lbs:  [] before manual  [] after manual  []w/heat    [] Ultrasound: []Continuous   [] Pulsed at:                            []1MHz   []3MHz Location:  W/cm2:    []  Paraffin         Location:  []w/heat   10 pre []  Ice     [x]  Heat  []  Ice massage Position: supine with LE's elevated  Location: lumbar spine    []  Laser  []  Other: Position:  Location:    []  Vasopneumatic Device Pressure:       [] lo [] med [] hi   Temperature:    [x] Skin assessment post-treatment:  [x]intact []redness- no adverse reaction    []redness  adverse reaction:      min Therapeutic Exercise:  [x] See flow sheet : added lateral step at stairs with TrA brace, quadruped alt UE flexion   Rationale: increase strength and improve coordination to improve the patients ability to General house work pain free. Running pain free. No restrictions at work. Ascend/ descend 10 stairs without discomfort. 25 min Manual Therapy:  MET to correct L posterior ilial rotation, MET to balance pelvis. STM/TPR L piriformis and glut med muscles in R s/l    Rationale: decrease pain, increase ROM, increase tissue extensibility and decrease trigger points  to improve the patients ability to General house work pain free. Running pain free. No restrictions at work. Ascend/ descend 10 stairs without discomfort. With   [x] TE   [] TA   [] neuro   [] other: Patient Education: [x] Review HEP    [] Progressed/Changed HEP based on:   [] positioning   [] body mechanics   [] transfers   [x] heat/ice application    [] other:      Other Objective/Functional Measures: mod to severe TTP L piriformis, glut med muscles and QL    Pain Level (0-10 scale) post treatment: 0/10    ASSESSMENT/Changes in Function:   Patient with significant higher pain levels and decreased tolerance to therapeutic interventions. Held exercises today due to pain levels, decreased pain after manual therapy and modalities. Patient to follow up with orthopedist on 3/13/17 to discuss MRI results.        Patient will continue to benefit from skilled PT services to modify and progress therapeutic interventions, address functional mobility deficits, address ROM deficits, address strength deficits, analyze and cue movement patterns and instruct in home and community integration to attain remaining goals. []  See Plan of Care  []  See progress note/recertification  []  See Discharge Summary         Progress towards goals / Updated goals:  nt     PLAN  []  Upgrade activities as tolerated     [x]  Continue plan of care  []  Update interventions per flow sheet       []  Discharge due to:_  [x]  Other: continue with core/lumbar strengthening per POC or per recommendations from MD, assess goals next visit.   Patient to inform therapist plan after discussing MRI results with MD.    Joss Broderick, PTA 3/8/2017  4:30 PM

## 2017-03-15 ENCOUNTER — HOSPITAL ENCOUNTER (OUTPATIENT)
Dept: PHYSICAL THERAPY | Age: 37
Discharge: HOME | End: 2017-03-15
Payer: COMMERCIAL

## 2017-03-15 PROCEDURE — 97110 THERAPEUTIC EXERCISES: CPT | Performed by: PHYSICAL THERAPY ASSISTANT

## 2017-03-15 PROCEDURE — 97140 MANUAL THERAPY 1/> REGIONS: CPT | Performed by: PHYSICAL THERAPY ASSISTANT

## 2017-03-15 PROCEDURE — 97014 ELECTRIC STIMULATION THERAPY: CPT | Performed by: PHYSICAL THERAPY ASSISTANT

## 2017-03-15 NOTE — PROGRESS NOTES
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 I could either get another injection or continue with strengthening in PT or on my own. I decided to have another radiologist look at it. \"    States doing better today, she feels her higher pain levels last week was due to being short staffed and \"having to stand all day wearing a lead vest.\"       OBJECTIVE    Modality rationale: decrease inflammation, decrease pain and increase tissue extensibility to improve the patients ability to General house work pain free. Running pain free. No restrictions at work. Ascend/ descend 10 stairs without discomfort.     Min Type Additional Details   15 [x] Estim: []Att   [x]Unatt        []TENS instruct                  [x]IFC  []Premod   []NMES                     []Other:  []w/US   []w/ice   [x]w/heat  Position: R s/l  Location: L SI joint    []  Traction: [] Cervical       []Lumbar                       [] Prone          []Supine                       []Intermittent   []Continuous Lbs:  [] before manual  [] after manual  []w/heat    []  Ultrasound: []Continuous   [] Pulsed at:                            []1MHz   []3MHz Location:  W/cm2:    []  Paraffin Location:  []w/heat    []  Ice     [x]  Heat  []  Ice massage Position: supine with LE's elevated  Location: lumbar spine    []  Laser  []  Other: Position:  Location:    []  Vasopneumatic Device Pressure:       [] lo [] med [] hi   Temperature:    [x] Skin assessment post-treatment:  [x]intact []redness- no adverse reaction    []redness  adverse reaction:     45 min Therapeutic Exercise:  [x] See flow sheet : quadruped iso hip abd/ER into ball at wall, standing L hip circles, a/p, m/l with L knee ball ball, quadruped weight shifts   Rationale: increase strength and improve coordination to improve the patients ability to General house work pain free. Running pain free. No restrictions at work. Ascend/ descend 10 stairs without discomfort. 25 min Manual Therapy: STM/TPR L piriformis and glut med muscles in R s/l    Rationale: decrease pain, increase ROM, increase tissue extensibility and decrease trigger points  to improve the patients ability to General house work pain free. Running pain free. No restrictions at work. Ascend/ descend 10 stairs without discomfort. With   [x] TE   [] TA   [] neuro   [] other: Patient Education: [x] Review HEP    [] Progressed/Changed HEP based on:   [] positioning   [] body mechanics   [] transfers   [x] heat/ice application    [] other:      Other Objective/Functional Measures: mod  TTP L piriformis, glut med muscles and QL    Pain Level (0-10 scale) post treatment: 0/10    ASSESSMENT/Changes in Function:   Decreased pain levels and decreased TTP along L piriformis and glut region today compared to previous session. Fatigue noted with quadruped and new hip stabilization exercises but no aggravation of symptoms.        Patient will continue to benefit from skilled PT services to modify and progress therapeutic interventions, address functional mobility deficits, address ROM deficits, address strength deficits, analyze and cue movement patterns and instruct in home and community integration to attain remaining goals.      []  See Plan of Care  []  See progress note/recertification  []  See Discharge Summary         Progress towards goals / Updated goals:  nt     PLAN  []  Upgrade activities as tolerated     [x]  Continue plan of care  []  Update interventions per flow sheet       []  Discharge due to:_  [x]  Other: assess goals/POC next visit    Lisset Saavedra PTA 3/15/2017  3:25 PM

## 2017-03-20 ENCOUNTER — HOSPITAL ENCOUNTER (OUTPATIENT)
Dept: PHYSICAL THERAPY | Age: 37
Discharge: HOME | End: 2017-03-20
Payer: COMMERCIAL

## 2017-03-20 PROCEDURE — 97140 MANUAL THERAPY 1/> REGIONS: CPT

## 2017-03-20 PROCEDURE — 97014 ELECTRIC STIMULATION THERAPY: CPT

## 2017-03-20 PROCEDURE — 97110 THERAPEUTIC EXERCISES: CPT

## 2017-03-20 NOTE — PROGRESS NOTES
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]    SUBJECTIVE  Pain Level (0-10 scale): 310  Any medication changes, allergies to medications, adverse drug reactions, diagnosis change, or new procedure performed?: [x] No    [] Yes (see summary sheet for update)  Subjective functional status/changes:   [] No changes reported    Pt reports \"frustrated\" discussed MRI w/ radiologist that specializes in orthopedic and he felt that there was not evidence of a labral tear; reports she cont to have high levels of pain, especially w/ incr activity/busy days at work; \"just feel like I can't do what I want to do\"; pt reports that he has suggested injection but she does not want to do this     OBJECTIVE    Modality rationale: decrease inflammation, decrease pain and increase tissue extensibility to improve the patients ability to General house work pain free. Running pain free. No restrictions at work. Ascend/ descend 10 stairs without discomfort.     Min Type Additional Details   15 [x] Estim: []Att   [x]Unatt        []TENS instruct                  [x]IFC  []Premod   []NMES                     []Other:  []w/US   []w/ice   [x]w/heat  Position: R s/l  Location: L SI joint    []  Traction: [] Cervical       []Lumbar                       [] Prone          []Supine                       []Intermittent   []Continuous Lbs:  [] before manual  [] after manual  []w/heat    []  Ultrasound: []Continuous   [] Pulsed at:                            []1MHz   []3MHz Location:  W/cm2:    []  Paraffin         Location:  []w/heat    []  Ice     [x]  Heat  []  Ice massage Position: supine with LE's elevated  Location: lumbar spine    []  Laser  []  Other: Position:  Location:    []  Vasopneumatic Device Pressure:       [] lo [] med [] hi   Temperature:    [x] Skin assessment post-treatment:  [x]intact []redness- no adverse reaction    []redness  adverse reaction:     25 min Therapeutic Exercise:  [x] See flow sheet : assess L hip, modified TE as per chart     Rationale: increase strength and improve coordination to improve the patients ability to General house work pain free. Running pain free. No restrictions at work. Ascend/ descend 10 stairs without discomfort. 25 min Manual Therapy: STM/TPR L piriformis and glut med muscles in R s/l, post glide grade I/II L hip in sup, hip flex to 90 degrees    Rationale: decrease pain, increase ROM, increase tissue extensibility and decrease trigger points  to improve the patients ability to General house work pain free. Running pain free. No restrictions at work. Ascend/ descend 10 stairs without discomfort. With   [x] TE   [] TA   [] neuro   [] other: Patient Education: [x] Review HEP    [x] Progressed/Changed HEP based on: advised pt to incorporate quad ex at home   [] positioning   [] body mechanics   [] transfers   [x] heat/ice application    [x] other: instruction utilization of FR for self massage      Other Objective/Functional Measures:     Lumb/pelvic alignment  WNL, however cont hypomobility noted w/ pressure     Palpation   Tender to palpation bilat SI joint L>R, L glut med,piriformis, TFL mm.      Strength  MMT bilat hip   Glut ext L 4/5  R 5/5  Hip ER L 4/5 R 5/5  Hip abd L 4/5 R 4/5  All other motions 5/5 bilat     Bridge good control, fatigue     Special tests  + SI joint provocaiton  + hip scour L   + FADIR L     FOTO score did not capture at visit, emailed to pt, will report next visit     Pain Level (0-10 scale) post treatment: 0/10    ASSESSMENT/Changes in Function:     Pt demonstrates gradual gains with core/hip strength and stability however cont to have high levels of pain; instructed pt in self massage w/ FR and to progress to tennis ball as appropriate which I feel may be a tool to better help her manage her sx; we discussed possibility of consulting another orthopedic MD to discuss MRI and discuss POC as she would prefer to avoid injection      Patient will continue to benefit from skilled PT services to modify and progress therapeutic interventions, address functional mobility deficits, address ROM deficits, address strength deficits, analyze and cue movement patterns and instruct in home and community integration to attain remaining goals.      []  See Plan of Care  []  See progress note/recertification  []  See Discharge Summary         Progress towards goals / Updated goals:  nt     PLAN  []  Upgrade activities as tolerated     []  Continue plan of care  []  Update interventions per flow sheet       []  Discharge due to:_  [x]  Other: pt to make decision re: second opinion, cont PT x 1 visit this week to review HEP, self massage and then possible hold/dc until ortho appt     Dom Bo, PT 3/20/2017  3:40 PM

## 2017-03-21 ENCOUNTER — APPOINTMENT (OUTPATIENT)
Dept: PHYSICAL THERAPY | Age: 37
End: 2017-03-21
Payer: COMMERCIAL

## 2017-04-03 ENCOUNTER — HOSPITAL ENCOUNTER (OUTPATIENT)
Dept: PHYSICAL THERAPY | Age: 37
Discharge: HOME | End: 2017-04-03
Payer: COMMERCIAL

## 2017-04-03 PROCEDURE — 97140 MANUAL THERAPY 1/> REGIONS: CPT | Performed by: PHYSICAL THERAPY ASSISTANT

## 2017-04-03 PROCEDURE — 97014 ELECTRIC STIMULATION THERAPY: CPT | Performed by: PHYSICAL THERAPY ASSISTANT

## 2017-04-03 NOTE — PROGRESS NOTES
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 Capo Jones for a second opinion for her L hip. States \"felt great\" on Thursday and Friday, increased shooting pain over the weekend \"everytime I put pressure on my left leg or lift it up it's a shooting pain. \" \"I have this strong urge to just stretch it. It feels like something is out of place. \"    OBJECTIVE    Modality rationale: decrease inflammation, decrease pain and increase tissue extensibility to improve the patients ability to General house work pain free. Running pain free. No restrictions at work. Ascend/ descend 10 stairs without discomfort.     Min Type Additional Details   15 [x] Estim: []Att   [x]Unatt        []TENS instruct                  [x]IFC  []Premod   []NMES                     []Other:  []w/US   []w/ice   [x]w/heat  Position: R s/l  Location: L SI joint    []  Traction: [] Cervical       []Lumbar                       [] Prone          []Supine                       []Intermittent   []Continuous Lbs:  [] before manual  [] after manual  []w/heat    []  Ultrasound: []Continuous   [] Pulsed at:                            []1MHz   []3MHz Location:  W/cm2:    []  Paraffin         Location:  []w/heat   10 []  Ice     [x]  Heat  []  Ice massage Position: supine with LE's elevated  Location: lumbar spine    [] Laser  []  Other: Position:  Location:    []  Vasopneumatic Device Pressure:       [] lo [] med [] hi   Temperature:    [x] Skin assessment post-treatment:  [x]intact []redness- no adverse reaction    []redness  adverse reaction:     omit min Therapeutic Exercise:  [x] See flow sheet : held exercises today due to high pain levels      Rationale: increase strength and improve coordination to improve the patients ability to General house work pain free. Running pain free. No restrictions at work. Ascend/ descend 10 stairs without discomfort. 25 min Manual Therapy: STM/TPR L piriformis and glut med muscles in R s/l, post glide grade I/II L hip in sup, hip flex to 90 degrees    Rationale: decrease pain, increase ROM, increase tissue extensibility and decrease trigger points  to improve the patients ability to General house work pain free. Running pain free. No restrictions at work. Ascend/ descend 10 stairs without discomfort. With   [x] TE   [] TA   [] neuro   [] other: Patient Education: [x] Review HEP    [x] Progressed/Changed HEP based on: advised pt to incorporate quad ex at home   [] positioning   [] body mechanics   [] transfers   [x] heat/ice application    [x] other: instruction utilization of FR for self massage      Other Objective/Functional Measures:     FOTO score  Did not obtain. Pain Level (0-10 scale) post treatment: 3/10    ASSESSMENT/Changes in Function:     Pt with increased L buttock/hip pain today and unable to tolerate any weightbearing exercise. Decreased pain after manual therapy and modalities. Patient will continue to benefit from skilled PT services to modify and progress therapeutic interventions, address functional mobility deficits, address ROM deficits, address strength deficits, analyze and cue movement patterns and instruct in home and community integration to attain remaining goals.      []  See Plan of Care  []  See progress note/recertification  []  See Discharge Summary         Progress towards goals / Updated goals:  nt     PLAN  []  Upgrade activities as tolerated     []  Continue plan of care  []  Update interventions per flow sheet       []  Discharge due to:_  [x]  Other: patient to schedule 1 additional visit next week then see MD on 4/28/17    Darby Ricketts PTA 4/3/2017  4:30 PM

## 2017-04-05 ENCOUNTER — APPOINTMENT (OUTPATIENT)
Dept: PHYSICAL THERAPY | Age: 37
End: 2017-04-05
Payer: COMMERCIAL

## 2017-04-10 ENCOUNTER — HOSPITAL ENCOUNTER (OUTPATIENT)
Dept: PHYSICAL THERAPY | Age: 37
Discharge: HOME | End: 2017-04-10
Payer: COMMERCIAL

## 2017-04-10 PROCEDURE — 97110 THERAPEUTIC EXERCISES: CPT | Performed by: PHYSICAL THERAPY ASSISTANT

## 2017-04-10 PROCEDURE — 97140 MANUAL THERAPY 1/> REGIONS: CPT | Performed by: PHYSICAL THERAPY ASSISTANT

## 2017-04-10 PROCEDURE — 97014 ELECTRIC STIMULATION THERAPY: CPT | Performed by: PHYSICAL THERAPY ASSISTANT

## 2017-04-10 NOTE — PROGRESS NOTES
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 Shanell Mckeon for a second opinion for her L hip. States that she felt good after last tx with addition of new exercises. Reports that steps are still hard for her to do. States that sitting in 1 position for a long period of time is \"miserable. \" Pt. Reports that she used ice over the weekend when symptoms flared up and then used heat before bed. Patient states she is going to [facility] on Thursday as her children are on Spring Break. States she is apprehensive about the amount of walking she will do. OBJECTIVE    Modality rationale: decrease inflammation, decrease pain and increase tissue extensibility to improve the patients ability to General house work pain free. Running pain free. No restrictions at work. Ascend/ descend 10 stairs without discomfort.     Min Type Additional Details   15 [x] Estim: []Att   [x]Unatt        []TENS instruct                  [x]IFC  []Premod   []NMES                     []Other:  []w/US   []w/ice   [x]w/heat  Position: R s/l  Location: L SI joint    []  Traction: [] Cervical       []Lumbar                       [] Prone          []Supine                       []Intermittent   []Continuous Lbs:  [] before manual  [] after manual  []w/heat    []  Ultrasound: []Continuous   [] Pulsed at: []1MHz   []3MHz Location:  W/cm2:    []  Paraffin         Location:  []w/heat    []  Ice     [x]  Heat  []  Ice massage Position: supine with LE's elevated  Location: lumbar spine    []  Laser  []  Other: Position:  Location:    []  Vasopneumatic Device Pressure:       [] lo [] med [] hi   Temperature:    [x] Skin assessment post-treatment:  [x]intact []redness- no adverse reaction    []redness  adverse reaction:     45 min Therapeutic Exercise:  [x] See flow sheet :    Rationale: increase strength and improve coordination to improve the patients ability to General house work pain free. Running pain free. No restrictions at work. Ascend/ descend 10 stairs without discomfort. 15 min Manual Therapy: STM/TPR L piriformis and glut med muscles in R s/l, post glide grade I/II L hip in sup, hip flex to 90 degrees    Rationale: decrease pain, increase ROM, increase tissue extensibility and decrease trigger points  to improve the patients ability to General house work pain free. Running pain free. No restrictions at work. Ascend/ descend 10 stairs without discomfort. With   [x] TE   [] TA   [] neuro   [] other: Patient Education: [x] Review HEP    [] Progressed/Changed HEP based on:   [] positioning   [] body mechanics   [] transfers   [x] heat/ice application    [x] other: Discussed possibility of purchasing portable TENS unit for pain management. Gave patient contact information to [employer]. Other Objective/Functional Measures: min TTP L piriformis and glut med muscles. Pain Level (0-10 scale) post treatment: 0/10    ASSESSMENT/Changes in Function:     Pt reported decreased pain after exercises today and tolerated tx well. Patient to contact Misti Cherry at [facility] regarding TENS unit. Decreased TTP L piriformis and glut med muscles.      Patient will continue to benefit from skilled PT services to modify and progress therapeutic interventions, address functional mobility deficits, address ROM deficits, address strength deficits, analyze and cue movement patterns and instruct in home and community integration to attain remaining goals.      []  See Plan of Care  []  See progress note/recertification  []  See Discharge Summary         Progress towards goals / Updated goals:  nt     PLAN  []  Upgrade activities as tolerated     []  Continue plan of care  []  Update interventions per flow sheet       []  Discharge due to:_  [x]  Other: patient to schedule 1 additional visit next week then see MD on 4/28/17    Darby Ricketts, PTA 4/10/2017  4:30 PM

## 2017-04-12 ENCOUNTER — APPOINTMENT (OUTPATIENT)
Dept: PHYSICAL THERAPY | Age: 37
End: 2017-04-12
Payer: COMMERCIAL

## 2017-05-04 ENCOUNTER — HOSPITAL ENCOUNTER (OUTPATIENT)
Dept: GENERAL RADIOLOGY | Age: 37
Discharge: HOME | End: 2017-05-04
Attending: ORTHOPAEDIC SURGERY
Payer: COMMERCIAL

## 2017-05-04 DIAGNOSIS — M25.552 LEFT HIP PAIN: ICD-10-CM

## 2017-05-04 PROCEDURE — 20610 DRAIN/INJ JOINT/BURSA W/O US: CPT

## 2017-05-04 PROCEDURE — 74011000250 HC RX REV CODE- 250: Performed by: RADIOLOGY

## 2017-05-04 PROCEDURE — 74011636320 HC RX REV CODE- 636/320: Performed by: RADIOLOGY

## 2017-05-04 PROCEDURE — 74011250636 HC RX REV CODE- 250/636: Performed by: RADIOLOGY

## 2017-05-04 RX ORDER — TRIAMCINOLONE ACETONIDE 40 MG/ML
40 INJECTION, SUSPENSION INTRA-ARTICULAR; INTRAMUSCULAR
Status: COMPLETED | OUTPATIENT
Start: 2017-05-04 | End: 2017-05-04

## 2017-05-04 RX ORDER — LIDOCAINE HYDROCHLORIDE 10 MG/ML
10 INJECTION INFILTRATION; PERINEURAL
Status: COMPLETED | OUTPATIENT
Start: 2017-05-04 | End: 2017-05-04

## 2017-05-04 RX ORDER — BUPIVACAINE HYDROCHLORIDE 5 MG/ML
5 INJECTION, SOLUTION EPIDURAL; INTRACAUDAL
Status: COMPLETED | OUTPATIENT
Start: 2017-05-04 | End: 2017-05-04

## 2017-05-04 RX ADMIN — BUPIVACAINE HYDROCHLORIDE 10 ML: 5 INJECTION, SOLUTION EPIDURAL; INTRACAUDAL at 14:10

## 2017-05-04 RX ADMIN — IOHEXOL 5 ML: 180 INJECTION INTRAVENOUS at 14:11

## 2017-05-04 RX ADMIN — LIDOCAINE HYDROCHLORIDE 5 ML: 10 INJECTION, SOLUTION INFILTRATION; PERINEURAL at 14:11

## 2017-05-04 RX ADMIN — TRIAMCINOLONE ACETONIDE 40 MG: 40 INJECTION, SUSPENSION INTRA-ARTICULAR; INTRAMUSCULAR at 14:10

## 2017-06-03 ENCOUNTER — APPOINTMENT (OUTPATIENT)
Dept: GENERAL RADIOLOGY | Age: 37
End: 2017-06-03
Attending: EMERGENCY MEDICINE
Payer: COMMERCIAL

## 2017-06-03 ENCOUNTER — HOSPITAL ENCOUNTER (EMERGENCY)
Age: 37
Discharge: HOME | End: 2017-06-04
Attending: EMERGENCY MEDICINE
Payer: COMMERCIAL

## 2017-06-03 DIAGNOSIS — R11.0 NAUSEA WITHOUT VOMITING: ICD-10-CM

## 2017-06-03 DIAGNOSIS — G43.009 NONINTRACTABLE MIGRAINE, UNSPECIFIED MIGRAINE TYPE: ICD-10-CM

## 2017-06-03 DIAGNOSIS — E87.6 HYPOKALEMIA: ICD-10-CM

## 2017-06-03 DIAGNOSIS — R07.9 ACUTE CHEST PAIN: Primary | ICD-10-CM

## 2017-06-03 LAB
ALBUMIN SERPL BCP-MCNC: 3.5 G/DL (ref 3.5–5)
ALBUMIN/GLOB SERPL: 0.9 {RATIO} (ref 1.1–2.2)
ALP SERPL-CCNC: 74 U/L (ref 45–117)
ALT SERPL-CCNC: 21 U/L (ref 12–78)
ANION GAP BLD CALC-SCNC: 9 MMOL/L (ref 5–15)
APPEARANCE UR: CLEAR
AST SERPL W P-5'-P-CCNC: 13 U/L (ref 15–37)
BASOPHILS # BLD AUTO: 0 K/UL (ref 0–0.1)
BASOPHILS # BLD: 0 % (ref 0–1)
BILIRUB SERPL-MCNC: 0.2 MG/DL (ref 0.2–1)
BILIRUB UR QL CFM: NEGATIVE
BUN SERPL-MCNC: 13 MG/DL (ref 6–20)
BUN/CREAT SERPL: 16 (ref 12–20)
CALCIUM SERPL-MCNC: 8.8 MG/DL (ref 8.5–10.1)
CHLORIDE SERPL-SCNC: 104 MMOL/L (ref 97–108)
CK SERPL-CCNC: 53 U/L (ref 26–192)
CO2 SERPL-SCNC: 26 MMOL/L (ref 21–32)
COLOR UR: ABNORMAL
CREAT SERPL-MCNC: 0.83 MG/DL (ref 0.55–1.02)
EOSINOPHIL # BLD: 0.1 K/UL (ref 0–0.4)
EOSINOPHIL NFR BLD: 1 % (ref 0–7)
ERYTHROCYTE [DISTWIDTH] IN BLOOD BY AUTOMATED COUNT: 12.2 % (ref 11.5–14.5)
GLOBULIN SER CALC-MCNC: 3.8 G/DL (ref 2–4)
GLUCOSE SERPL-MCNC: 112 MG/DL (ref 65–100)
GLUCOSE UR STRIP.AUTO-MCNC: NEGATIVE MG/DL
HCT VFR BLD AUTO: 36.9 % (ref 35–47)
HGB BLD-MCNC: 12.1 G/DL (ref 11.5–16)
HGB UR QL STRIP: NEGATIVE
KETONES UR QL STRIP.AUTO: ABNORMAL MG/DL
LEUKOCYTE ESTERASE UR QL STRIP.AUTO: NEGATIVE
LYMPHOCYTES # BLD AUTO: 27 % (ref 12–49)
LYMPHOCYTES # BLD: 1.7 K/UL (ref 0.8–3.5)
MCH RBC QN AUTO: 27.8 PG (ref 26–34)
MCHC RBC AUTO-ENTMCNC: 32.8 G/DL (ref 30–36.5)
MCV RBC AUTO: 84.8 FL (ref 80–99)
MONOCYTES # BLD: 0.6 K/UL (ref 0–1)
MONOCYTES NFR BLD AUTO: 9 % (ref 5–13)
NEUTS SEG # BLD: 4 K/UL (ref 1.8–8)
NEUTS SEG NFR BLD AUTO: 63 % (ref 32–75)
NITRITE UR QL STRIP.AUTO: NEGATIVE
PH UR STRIP: 6 [PH] (ref 5–8)
PLATELET # BLD AUTO: 234 K/UL (ref 150–400)
POTASSIUM SERPL-SCNC: 3 MMOL/L (ref 3.5–5.1)
PROT SERPL-MCNC: 7.3 G/DL (ref 6.4–8.2)
PROT UR STRIP-MCNC: NEGATIVE MG/DL
RBC # BLD AUTO: 4.35 M/UL (ref 3.8–5.2)
SODIUM SERPL-SCNC: 139 MMOL/L (ref 136–145)
SP GR UR REFRACTOMETRY: 1.03 (ref 1–1.03)
TROPONIN I SERPL-MCNC: <0.04 NG/ML
UROBILINOGEN UR QL STRIP.AUTO: 0.2 EU/DL (ref 0.2–1)
WBC # BLD AUTO: 6.3 K/UL (ref 3.6–11)

## 2017-06-03 PROCEDURE — 96361 HYDRATE IV INFUSION ADD-ON: CPT

## 2017-06-03 PROCEDURE — 83735 ASSAY OF MAGNESIUM: CPT | Performed by: EMERGENCY MEDICINE

## 2017-06-03 PROCEDURE — 74011000250 HC RX REV CODE- 250: Performed by: EMERGENCY MEDICINE

## 2017-06-03 PROCEDURE — 82550 ASSAY OF CK (CPK): CPT | Performed by: EMERGENCY MEDICINE

## 2017-06-03 PROCEDURE — 99285 EMERGENCY DEPT VISIT HI MDM: CPT

## 2017-06-03 PROCEDURE — 36415 COLL VENOUS BLD VENIPUNCTURE: CPT | Performed by: EMERGENCY MEDICINE

## 2017-06-03 PROCEDURE — 83690 ASSAY OF LIPASE: CPT | Performed by: EMERGENCY MEDICINE

## 2017-06-03 PROCEDURE — 74011250636 HC RX REV CODE- 250/636: Performed by: EMERGENCY MEDICINE

## 2017-06-03 PROCEDURE — 81025 URINE PREGNANCY TEST: CPT | Performed by: EMERGENCY MEDICINE

## 2017-06-03 PROCEDURE — 93005 ELECTROCARDIOGRAM TRACING: CPT

## 2017-06-03 PROCEDURE — 80053 COMPREHEN METABOLIC PANEL: CPT | Performed by: EMERGENCY MEDICINE

## 2017-06-03 PROCEDURE — 84484 ASSAY OF TROPONIN QUANT: CPT | Performed by: EMERGENCY MEDICINE

## 2017-06-03 PROCEDURE — 85025 COMPLETE CBC W/AUTO DIFF WBC: CPT | Performed by: EMERGENCY MEDICINE

## 2017-06-03 PROCEDURE — 96375 TX/PRO/DX INJ NEW DRUG ADDON: CPT

## 2017-06-03 PROCEDURE — 96376 TX/PRO/DX INJ SAME DRUG ADON: CPT

## 2017-06-03 PROCEDURE — 81003 URINALYSIS AUTO W/O SCOPE: CPT | Performed by: EMERGENCY MEDICINE

## 2017-06-03 PROCEDURE — 71010 XR CHEST PORT: CPT

## 2017-06-03 PROCEDURE — 96374 THER/PROPH/DIAG INJ IV PUSH: CPT

## 2017-06-03 RX ORDER — ONDANSETRON 2 MG/ML
4 INJECTION INTRAMUSCULAR; INTRAVENOUS
Status: COMPLETED | OUTPATIENT
Start: 2017-06-03 | End: 2017-06-03

## 2017-06-03 RX ORDER — MORPHINE SULFATE 2 MG/ML
4 INJECTION, SOLUTION INTRAMUSCULAR; INTRAVENOUS
Status: COMPLETED | OUTPATIENT
Start: 2017-06-03 | End: 2017-06-03

## 2017-06-03 RX ORDER — HYDROMORPHONE HYDROCHLORIDE 1 MG/ML
1 INJECTION, SOLUTION INTRAMUSCULAR; INTRAVENOUS; SUBCUTANEOUS
Status: COMPLETED | OUTPATIENT
Start: 2017-06-03 | End: 2017-06-04

## 2017-06-03 RX ORDER — ONDANSETRON 2 MG/ML
4 INJECTION INTRAMUSCULAR; INTRAVENOUS
Status: COMPLETED | OUTPATIENT
Start: 2017-06-03 | End: 2017-06-04

## 2017-06-03 RX ORDER — DIPHENHYDRAMINE HYDROCHLORIDE 50 MG/ML
25 INJECTION, SOLUTION INTRAMUSCULAR; INTRAVENOUS
Status: COMPLETED | OUTPATIENT
Start: 2017-06-03 | End: 2017-06-03

## 2017-06-03 RX ADMIN — ONDANSETRON HYDROCHLORIDE 4 MG: 2 INJECTION, SOLUTION INTRAMUSCULAR; INTRAVENOUS at 23:12

## 2017-06-03 RX ADMIN — DIPHENHYDRAMINE HYDROCHLORIDE 25 MG: 50 INJECTION, SOLUTION INTRAMUSCULAR; INTRAVENOUS at 23:12

## 2017-06-03 RX ADMIN — Medication 4 MG: at 23:12

## 2017-06-03 RX ADMIN — FAMOTIDINE 20 MG: 10 INJECTION, SOLUTION INTRAVENOUS at 23:45

## 2017-06-04 ENCOUNTER — APPOINTMENT (OUTPATIENT)
Dept: CT IMAGING | Age: 37
End: 2017-06-04
Attending: EMERGENCY MEDICINE
Payer: COMMERCIAL

## 2017-06-04 VITALS
HEIGHT: 62 IN | OXYGEN SATURATION: 99 % | RESPIRATION RATE: 11 BRPM | HEART RATE: 81 BPM | TEMPERATURE: 97.6 F | BODY MASS INDEX: 30.87 KG/M2 | SYSTOLIC BLOOD PRESSURE: 132 MMHG | DIASTOLIC BLOOD PRESSURE: 81 MMHG | WEIGHT: 167.77 LBS

## 2017-06-04 LAB
ATRIAL RATE: 80 BPM
CALCULATED P AXIS, ECG09: 57 DEGREES
CALCULATED R AXIS, ECG10: 18 DEGREES
CALCULATED T AXIS, ECG11: 16 DEGREES
DIAGNOSIS, 93000: NORMAL
HCG UR QL: NEGATIVE
LIPASE SERPL-CCNC: 91 U/L (ref 73–393)
MAGNESIUM SERPL-MCNC: 2.1 MG/DL (ref 1.6–2.4)
P-R INTERVAL, ECG05: 162 MS
Q-T INTERVAL, ECG07: 368 MS
QRS DURATION, ECG06: 80 MS
QTC CALCULATION (BEZET), ECG08: 424 MS
TROPONIN I SERPL-MCNC: <0.04 NG/ML
VENTRICULAR RATE, ECG03: 80 BPM

## 2017-06-04 PROCEDURE — 71275 CT ANGIOGRAPHY CHEST: CPT

## 2017-06-04 PROCEDURE — 74011636320 HC RX REV CODE- 636/320: Performed by: EMERGENCY MEDICINE

## 2017-06-04 PROCEDURE — 74011250636 HC RX REV CODE- 250/636: Performed by: EMERGENCY MEDICINE

## 2017-06-04 PROCEDURE — 84484 ASSAY OF TROPONIN QUANT: CPT | Performed by: EMERGENCY MEDICINE

## 2017-06-04 PROCEDURE — 36415 COLL VENOUS BLD VENIPUNCTURE: CPT | Performed by: EMERGENCY MEDICINE

## 2017-06-04 RX ORDER — PROMETHAZINE HYDROCHLORIDE 25 MG/1
25 TABLET ORAL
Qty: 12 TAB | Refills: 0 | Status: SHIPPED | OUTPATIENT
Start: 2017-06-04 | End: 2017-11-08 | Stop reason: ALTCHOICE

## 2017-06-04 RX ORDER — SODIUM CHLORIDE 0.9 % (FLUSH) 0.9 %
10 SYRINGE (ML) INJECTION
Status: COMPLETED | OUTPATIENT
Start: 2017-06-04 | End: 2017-06-04

## 2017-06-04 RX ORDER — PROMETHAZINE HYDROCHLORIDE 25 MG/1
25 TABLET ORAL
Qty: 12 TAB | Refills: 0 | Status: SHIPPED | OUTPATIENT
Start: 2017-06-04 | End: 2017-06-04

## 2017-06-04 RX ORDER — POTASSIUM CHLORIDE 750 MG/1
10 TABLET, FILM COATED, EXTENDED RELEASE ORAL DAILY
Qty: 10 TAB | Refills: 0 | Status: SHIPPED | OUTPATIENT
Start: 2017-06-04 | End: 2017-11-08 | Stop reason: ALTCHOICE

## 2017-06-04 RX ORDER — SODIUM CHLORIDE 9 MG/ML
50 INJECTION, SOLUTION INTRAVENOUS
Status: COMPLETED | OUTPATIENT
Start: 2017-06-04 | End: 2017-06-04

## 2017-06-04 RX ORDER — OXYCODONE AND ACETAMINOPHEN 5; 325 MG/1; MG/1
1 TABLET ORAL
Qty: 20 TAB | Refills: 0 | Status: SHIPPED | OUTPATIENT
Start: 2017-06-04 | End: 2017-11-08 | Stop reason: ALTCHOICE

## 2017-06-04 RX ORDER — POTASSIUM CHLORIDE 750 MG/1
10 TABLET, FILM COATED, EXTENDED RELEASE ORAL DAILY
Qty: 10 TAB | Refills: 0 | Status: SHIPPED | OUTPATIENT
Start: 2017-06-04 | End: 2017-06-04

## 2017-06-04 RX ADMIN — SODIUM CHLORIDE 50 ML/HR: 900 INJECTION, SOLUTION INTRAVENOUS at 00:33

## 2017-06-04 RX ADMIN — SODIUM CHLORIDE 1000 ML: 900 INJECTION, SOLUTION INTRAVENOUS at 00:11

## 2017-06-04 RX ADMIN — HYDROMORPHONE HYDROCHLORIDE 1 MG: 1 INJECTION, SOLUTION INTRAMUSCULAR; INTRAVENOUS; SUBCUTANEOUS at 00:11

## 2017-06-04 RX ADMIN — ONDANSETRON HYDROCHLORIDE 4 MG: 2 INJECTION, SOLUTION INTRAMUSCULAR; INTRAVENOUS at 00:11

## 2017-06-04 RX ADMIN — IOPAMIDOL 100 ML: 755 INJECTION, SOLUTION INTRAVENOUS at 00:33

## 2017-06-04 RX ADMIN — Medication 10 ML: at 00:33

## 2017-06-04 NOTE — ED NOTES
Bedside and Verbal shift change report received from [facility] OF Warren TEAGAN, RN. Report included the following information: SBAR, ED Summary, Intake/Output, MAR and Recent Results. Monitor x 3, call bell within reach.

## 2017-06-04 NOTE — ED PROVIDER NOTES
HPI Comments: Ni Rich is a 29 y.o. female with PMhx significant for Palacios-Parkinson-White, IBS, migraines, asthma, depression, GERD, arthritis, and arrhythmia who presents ambulatory to the ED with cc of constant 9/10 headache with associated neck pain, photophobia, and three episodes of nausea/emesis beginning 1430. Pt also c/o of 2-3/10, non-radiating, left sided chest pain beginning around 2130. She denies any recent head trauma or injury that could contribute to her headache. Pt notes hx of migraines and states her current symptoms are consistent. She endorses taking Maxalt and Fioricet at 1700, resting in the dark, and applying a hot rice pack to her eyes to no relief. Pt denies any tobacco, EtOH, and illicit drug use. She specifically denies any diarrhea, abdominal pain, SOB, leg pain, leg swelling, and numbness. PCP: Kellee Noel MD    There are no other complaints, changes or physical findings at this time. The history is provided by the patient. 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     Unspecified adverse effect of anesthesia     with C- section the spinal did not take and had to be put to sleep    Palacios-Parkinson-White syndrome     Marie-Parkinson-White syndrome        Past Surgical History:   Procedure Laterality Date     DELIVERY ONLY  2007    HX  SECTION      c section x 2    HX CHOLECYSTECTOMY      HX HEENT  As Child    \"tubes in each ear\" as child    HX SVT ABLATION      ,     HX TONSILLECTOMY  2007    tonsils         Family History:   Problem Relation Age of Onset    Cancer Mother 40     ovarian    Migraines Mother     Heart Disease Father     Migraines Father     Cancer Maternal Grandfather      lung    Psychiatric Disorder Maternal Grandmother     Parkinson's Disease Paternal Grandfather     Migraines Sister     Cancer Paternal Grandmother      breast cancer    Asthma Child     Eczema Child        Social History     Social History    Marital status:      Spouse name: N/A    Number of children: N/A    Years of education: N/A     Occupational History    Not on file. Social History Main Topics    Smoking status: Never Smoker    Smokeless tobacco: Never Used    Alcohol use 0.5 - 1.0 oz/week     1 - 2 Glasses of wine per week      Comment: 1    Drug use: No    Sexual activity: Yes     Partners: Male     Birth control/ protection: Pill     Other Topics Concern    Not on file     Social History Narrative         ALLERGIES: Lisinopril    Review of Systems   Constitutional: Negative for fever. Eyes: Positive for photophobia. Respiratory: Negative for shortness of breath. Cardiovascular: Positive for chest pain. Negative for leg swelling. Gastrointestinal: Positive for nausea and vomiting. Negative for abdominal pain and diarrhea. Endocrine: Negative for heat intolerance. Genitourinary: Negative. Musculoskeletal: Positive for neck pain. - leg pain   Skin: Negative for rash. Allergic/Immunologic: Negative for immunocompromised state. Neurological: Positive for headaches. Negative for numbness.    Hematological: Does not bruise/bleed easily. Psychiatric/Behavioral: Negative. All other systems reviewed and are negative. Patient Vitals for the past 12 hrs:   Temp Pulse Resp BP SpO2   06/04/17 0244 - - - - 99 %   06/04/17 0242 - - - 132/81 -   06/04/17 0145 - 81 11 121/67 96 %   06/04/17 0130 - 88 11 137/83 97 %   06/04/17 0115 - 86 11 132/87 96 %   06/04/17 0100 - 87 12 136/85 96 %   06/04/17 0045 - 86 12 - 96 %   06/04/17 0015 - 83 16 142/75 99 %   06/04/17 0000 - 88 19 - 99 %   06/03/17 2345 - 82 16 (!) 142/110 100 %   06/03/17 2330 - 84 16 138/88 97 %   06/03/17 2315 - 77 17 138/87 99 %   06/03/17 2300 - 81 23 123/82 98 %   06/03/17 2245 - 83 19 (!) 139/94 100 %   06/03/17 2237 - 80 16 - 99 %   06/03/17 2236 - - - (!) 138/92 -   06/03/17 2213 97.6 °F (36.4 °C) 89 18 (!) 134/109 98 %            Physical Exam   Constitutional: She is oriented to person, place, and time. She appears well-developed and well-nourished. Moderate distress  Photophobic   HENT:   Head: Normocephalic and atraumatic. Eyes: EOM are normal. Pupils are equal, round, and reactive to light. Neck: Normal range of motion. Non-tender   Cardiovascular: Normal rate, regular rhythm and normal heart sounds. Pulmonary/Chest: Effort normal and breath sounds normal. No respiratory distress. Abdominal: Soft. Bowel sounds are normal. She exhibits no mass. There is no tenderness. Musculoskeletal: Normal range of motion. She exhibits no edema. Neurological: She is alert and oriented to person, place, and time. Coordination normal.   No focal deficits   Skin: Skin is warm and dry. Psychiatric: She has a normal mood and affect. Nursing note and vitals reviewed.        MDM  Number of Diagnoses or Management Options  Acute chest pain:   Hypokalemia:   Nausea without vomiting:   Nonintractable migraine, unspecified migraine type:   Diagnosis management comments: DDx: migraine, costochondritis, gastritis, CAD, electrolyte abnormality, UTI       Amount and/or Complexity of Data Reviewed  Clinical lab tests: ordered and reviewed  Tests in the radiology section of CPT®: ordered and reviewed  Tests in the medicine section of CPT®: ordered and reviewed  Review and summarize past medical records: yes  Independent visualization of images, tracings, or specimens: yes    Patient Progress  Patient progress: stable    ED Course       Procedures    EKG interpretation: (Preliminary)  5105  Rhythm: normal sinus rhythm; and regular . Rate (approx.): 80; Axis: normal; MO interval: normal; QRS interval: normal ; ST/T wave: normal; Other findings: no significant change. PROGRESS NOTE:  11:49 PM  Pt c/o of 10/10 chest pain. She states her current chest pain is not consistent with prior episode as it radiates from her epigastrium up to her throat. She notes, however, her headache is much improved. Written by Darrell Baxter, ED Scribe, as dictated by Isabelle Grijalva MD.    PROGRESS NOTE:  2:08 AM  Pt reports she is feeling better and is ready to go home.   Written by Darrell Baxter, ED Scribe, as dictated by Isabelle Grijalva MD.    LABORATORY TESTS:  Recent Results (from the past 12 hour(s))   EKG, 12 LEAD, INITIAL    Collection Time: 06/03/17 10:15 PM   Result Value Ref Range    Ventricular Rate 80 BPM    Atrial Rate 80 BPM    P-R Interval 162 ms    QRS Duration 80 ms    Q-T Interval 368 ms    QTC Calculation (Bezet) 424 ms    Calculated P Axis 57 degrees    Calculated R Axis 18 degrees    Calculated T Axis 16 degrees    Diagnosis       Normal sinus rhythm  Normal ECG  When compared with ECG of 18-NOV-2016 14:22,  No significant change was found     CBC WITH AUTOMATED DIFF    Collection Time: 06/03/17 10:24 PM   Result Value Ref Range    WBC 6.3 3.6 - 11.0 K/uL    RBC 4.35 3.80 - 5.20 M/uL    HGB 12.1 11.5 - 16.0 g/dL    HCT 36.9 35.0 - 47.0 %    MCV 84.8 80.0 - 99.0 FL    MCH 27.8 26.0 - 34.0 PG    MCHC 32.8 30.0 - 36.5 g/dL    RDW 12.2 11.5 - 14.5 %    PLATELET 864 785 - 260 K/uL NEUTROPHILS 63 32 - 75 %    LYMPHOCYTES 27 12 - 49 %    MONOCYTES 9 5 - 13 %    EOSINOPHILS 1 0 - 7 %    BASOPHILS 0 0 - 1 %    ABS. NEUTROPHILS 4.0 1.8 - 8.0 K/UL    ABS. LYMPHOCYTES 1.7 0.8 - 3.5 K/UL    ABS. MONOCYTES 0.6 0.0 - 1.0 K/UL    ABS. EOSINOPHILS 0.1 0.0 - 0.4 K/UL    ABS. BASOPHILS 0.0 0.0 - 0.1 K/UL   METABOLIC PANEL, COMPREHENSIVE    Collection Time: 06/03/17 10:24 PM   Result Value Ref Range    Sodium 139 136 - 145 mmol/L    Potassium 3.0 (L) 3.5 - 5.1 mmol/L    Chloride 104 97 - 108 mmol/L    CO2 26 21 - 32 mmol/L    Anion gap 9 5 - 15 mmol/L    Glucose 112 (H) 65 - 100 mg/dL    BUN 13 6 - 20 MG/DL    Creatinine 0.83 0.55 - 1.02 MG/DL    BUN/Creatinine ratio 16 12 - 20      GFR est AA >60 >60 ml/min/1.73m2    GFR est non-AA >60 >60 ml/min/1.73m2    Calcium 8.8 8.5 - 10.1 MG/DL    Bilirubin, total 0.2 0.2 - 1.0 MG/DL    ALT (SGPT) 21 12 - 78 U/L    AST (SGOT) 13 (L) 15 - 37 U/L    Alk.  phosphatase 74 45 - 117 U/L    Protein, total 7.3 6.4 - 8.2 g/dL    Albumin 3.5 3.5 - 5.0 g/dL    Globulin 3.8 2.0 - 4.0 g/dL    A-G Ratio 0.9 (L) 1.1 - 2.2     CK W/ REFLX CKMB    Collection Time: 06/03/17 10:24 PM   Result Value Ref Range    CK 53 26 - 192 U/L   TROPONIN I    Collection Time: 06/03/17 10:24 PM   Result Value Ref Range    Troponin-I, Qt. <0.04 <0.05 ng/mL   LIPASE    Collection Time: 06/03/17 10:24 PM   Result Value Ref Range    Lipase 91 73 - 393 U/L   MAGNESIUM    Collection Time: 06/03/17 10:24 PM   Result Value Ref Range    Magnesium 2.1 1.6 - 2.4 mg/dL   URINALYSIS W/ RFLX MICROSCOPIC    Collection Time: 06/03/17 11:14 PM   Result Value Ref Range    Color YELLOW/STRAW      Appearance CLEAR CLEAR      Specific gravity 1.030 1.003 - 1.030      pH (UA) 6.0 5.0 - 8.0      Protein NEGATIVE  NEG mg/dL    Glucose NEGATIVE  NEG mg/dL    Ketone TRACE (A) NEG mg/dL    Blood NEGATIVE  NEG      Urobilinogen 0.2 0.2 - 1.0 EU/dL    Nitrites NEGATIVE  NEG      Leukocyte Esterase NEGATIVE  NEG     BILIRUBIN, CONFIRM    Collection Time: 06/03/17 11:14 PM   Result Value Ref Range    Bilirubin UA, confirm NEGATIVE  NEG     HCG URINE, QL    Collection Time: 06/03/17 11:14 PM   Result Value Ref Range    HCG urine, Ql. NEGATIVE  NEG     TROPONIN I    Collection Time: 06/04/17 12:49 AM   Result Value Ref Range    Troponin-I, Qt. <0.04 <0.05 ng/mL       IMAGING RESULTS:  CTA CHEST W OR W WO CONT   Final Result   EXAM: CT ANGIOGRAPHY CHEST   INDICATION: Chest pain with nausea and migraine headache. COMPARISON: None. CONTRAST: 70 mL of Isovue-370.     TECHNIQUE:   Precontrast  images were obtained to localize the volume for acquisition. Multislice helical CT arteriography was performed from the diaphragm to the  thoracic inlet during uneventful rapid bolus intravenous contrast  administration. Lung and soft tissue windows were generated. Coronal and  sagittal reformatted images were also generated and 3D post processing  consisting of coronal maximum intensity projection images was performed. CT dose  reduction was achieved through use of a standardized protocol tailored for this  examination and automatic exposure control for dose modulation.     FINDINGS:  LOWER NECK: The visualized portions of the thyroid and structures of the lower  neck are within normal limits. LUNGS: The lungs are clear of mass, nodule, airspace disease or edema. PLEURA: There is no pleural effusion or pneumothorax. PULMONARY ARTERIES: The pulmonary arteries are well enhanced and no pulmonary  emboli are identified. AORTA: The aorta enhances normally without evidence of aneurysm or dissection. MEDIASTINUM: There is no mediastinal or hilar adenopathy or mass. BONES AND SOFT TISSUES: The bones and soft tissues of the chest wall are within  normal limits. UPPER ABDOMEN: The visualized portions of the upper abdominal organs are normal.     IMPRESSION  IMPRESSION: Normal CT arteriogram of the chest. No pulmonary embolus.     XR CHEST PORT Final Result   EXAM: XR CHEST PORT. INDICATION: Chest pain. COMPARISON: 11/13/2015.     FINDINGS:   A portable AP radiograph of the chest was obtained at 2218 hours. Lines and tubes: None. Lungs: The lungs are clear. Pleura: There is no pneumothorax or pleural effusion. Mediastinum: The cardiac and mediastinal contours and pulmonary vascularity are  normal.  Bones and soft tissues: The bones and soft tissues are grossly within normal  limits.     IMPRESSION  IMPRESSION: Normal chest.       MEDICATIONS GIVEN:  Medications   morphine injection 4 mg (4 mg IntraVENous Given 6/3/17 2312)   ondansetron (ZOFRAN) injection 4 mg (4 mg IntraVENous Given 6/3/17 2312)   diphenhydrAMINE (BENADRYL) injection 25 mg (25 mg IntraVENous Given 6/3/17 2312)   famotidine (PF) (PEPCID) 20 mg in sodium chloride 0.9 % 10 mL injection (20 mg IntraVENous Given 6/3/17 2345)   HYDROmorphone (PF) (DILAUDID) injection 1 mg (1 mg IntraVENous Given 6/4/17 0011)   ondansetron (ZOFRAN) injection 4 mg (4 mg IntraVENous Given 6/4/17 0011)   sodium chloride 0.9 % bolus infusion 1,000 mL (0 mL IntraVENous IV Completed 6/4/17 0111)   0.9% sodium chloride infusion (0 mL/hr IntraVENous IV Completed 6/4/17 0216)   iopamidol (ISOVUE-370) 76 % injection 100 mL (100 mL IntraVENous Given 6/4/17 0033)   sodium chloride (NS) flush 10 mL (10 mL IntraVENous Given 6/4/17 0033)       IMPRESSION:  1. Acute chest pain    2. Nonintractable migraine, unspecified migraine type    3. Nausea without vomiting    4. Hypokalemia        PLAN:  1. Discharge home  Current Discharge Medication List      START taking these medications    Details   potassium chloride SR (KLOR-CON 10) 10 mEq tablet Take 1 Tab by mouth daily. Qty: 10 Tab, Refills: 0      oxyCODONE-acetaminophen (PERCOCET) 5-325 mg per tablet Take 1 Tab by mouth every four (4) hours as needed for Pain. Max Daily Amount: 6 Tabs.   Qty: 20 Tab, Refills: 0      promethazine (PHENERGAN) 25 mg tablet Take 1 Tab by mouth every six (6) hours as needed for Nausea. Qty: 12 Tab, Refills: 0         CONTINUE these medications which have NOT CHANGED    Details   norgestimate-ethinyl estradiol (ORTHO TRI-CYCLEN, 28,) 0.18/0.215/0.25 mg-35 mcg (28) tablet Take 1 Tab by mouth daily. sertraline (ZOLOFT) 50 mg tablet Take 50 mg by mouth daily. STOP taking these medications       BUTALB/ACETAMINOPHEN/CAFFEINE (FIORICET PO) Comments:   Reason for Stoppin.   Follow-up Information     Follow up With Details Comments Contact Info    Yanet Arnold MD In 2 days As needed [address]  [facility]  [phone]      call your Cardiologist In 1 day      [facility] EMERGENCY DEPT  If symptoms worsen [address] [place] [address] Drive  [address] [facility]  [phone]        Return to ED if worse     DISCHARGE NOTE:  2:23 AM  The patient is ready for discharge. The patients signs, symptoms, diagnosis, and instructions for discharge have been discussed and the pt has conveyed their understanding. The patient is to follow up as recommended with PCP or return to the ER should their symptoms worsen. Plan has been discussed and patient has conveyed their agreement. This note is prepared by Aayush Quiles, acting as Scribe for Gregg Kumar MD.    Gregg Kumar MD: The scribe's documentation has been prepared under my direction and personally reviewed by me in its entirety. I confirm that the note above accurately reflects all work, treatment, procedures, and medical decision making performed by me.

## 2017-06-04 NOTE — ED NOTES
Bedside and Verbal shift change report given to MENDEL Simpson (oncoming nurse) by Chace Sterling (offgoing nurse). Report included the following information SBAR, ED Summary, Intake/Output, MAR and Recent Results.

## 2017-06-04 NOTE — ED NOTES
Pt arrived via wheelchair to room #40 from triage. Pt with c/o of chest pain and migraine x tonight. Pt reports hx of migraines, stating that she has not seen a Neurologist \"in a while\", but that she is prescribed both Maxalt and Fioricet, which she took today, with no relief of pain. Pt also reports extensive cardiac hx due to WPW and Fang Danlos Syndrome, noting that she has had an ablation in the past.  Pt resting in position of comfort. Call bell within reach.

## 2017-06-04 NOTE — DISCHARGE INSTRUCTIONS
Chest Pain: Care Instructions  Your Care Instructions  There are many things that can cause chest pain. Some are not serious and will get better on their own in a few days. But some kinds of chest pain need more testing and treatment. Your doctor may have recommended a follow-up visit in the next 8 to 12 hours. If you are not getting better, you may need more tests or treatment. Even though your doctor has released you, you still need to watch for any problems. The doctor carefully checked you, but sometimes problems can develop later. If you have new symptoms or if your symptoms do not get better, get medical care right away. If you have worse or different chest pain or pressure that lasts more than 5 minutes or you passed out (lost consciousness), call 911 or seek other emergency help right away. A medical visit is only one step in your treatment. Even if you feel better, you still need to do what your doctor recommends, such as going to all suggested follow-up appointments and taking medicines exactly as directed. This will help you recover and help prevent future problems. How can you care for yourself at home? · Rest until you feel better. · Take your medicine exactly as prescribed. Call your doctor if you think you are having a problem with your medicine. · Do not drive after taking a prescription pain medicine. When should you call for help? Call 911 if:  · You passed out (lost consciousness). · You have severe difficulty breathing. · You have symptoms of a heart attack. These may include:  ¨ Chest pain or pressure, or a strange feeling in your chest.  ¨ Sweating. ¨ Shortness of breath. ¨ Nausea or vomiting. ¨ Pain, pressure, or a strange feeling in your back, neck, jaw, or upper belly or in one or both shoulders or arms. ¨ Lightheadedness or sudden weakness. ¨ A fast or irregular heartbeat.   After you call 911, the  may tell you to chew 1 adult-strength or 2 to 4 low-dose aspirin. Wait for an ambulance. Do not try to drive yourself. Call your doctor today if:  · You have any trouble breathing. · Your chest pain gets worse. · You are dizzy or lightheaded, or you feel like you may faint. · You are not getting better as expected. · You are having new or different chest pain. Where can you learn more? Go to http://dorita-nara.info/. Enter A120 in the search box to learn more about \"Chest Pain: Care Instructions. \"  Current as of: May 27, 2016  Content Version: 11.2  © 8401-3908 [employer]. Care instructions adapted under license by [employer] (which disclaims liability or warranty for this information). If you have questions about a medical condition or this instruction, always ask your healthcare professional. [address] any warranty or liability for your use of this information. Hypokalemia: Care Instructions  Your Care Instructions  Hypokalemia (say \"fs-uc-wjo-BILL-trevon-uh\") is a low level of potassium. The heart, muscles, kidneys, and nervous system all need potassium to work well. This problem has many different causes. Kidney problems, diet, and medicines like diuretics and laxatives can cause it. So can vomiting or diarrhea. In some cases, cancer is the cause. Your doctor may do tests to find the cause of your low potassium levels. You may need medicines to bring your potassium levels back to normal. You may also need regular blood tests to check your potassium. If you have very low potassium, you may need intravenous (IV) medicines. You also may need tests to check the electrical activity of your heart. Heart problems caused by low potassium levels can be very serious. Follow-up care is a key part of your treatment and safety. Be sure to make and go to all appointments, and call your doctor if you are having problems.  It's also a good idea to know your test results and keep a list of the medicines you take. How can you care for yourself at home? · If your doctor recommends it, eat foods that have a lot of potassium. These include fresh fruits, juices, and vegetables. They also include nuts, beans, and milk. · Be safe with medicines. If your doctor prescribes medicines or potassium supplements, take them exactly as directed. Call your doctor if you have any problems with your medicines. · Get your potassium levels tested as often as your doctor tells you. When should you call for help? Call 911 anytime you think you may need emergency care. For example, call if:  · You feel like your heart is missing beats. Heart problems caused by low potassium can cause death. · You passed out (lost consciousness). · You have a seizure. Call your doctor now or seek immediate medical care if:  · You feel weak or unusually tired. · You have severe arm or leg cramps. · You have tingling or numbness. · You feel sick to your stomach, or you vomit. · You have belly cramps. · You feel bloated or constipated. · You have to urinate a lot. · You feel very thirsty most of the time. · You are dizzy or lightheaded, or you feel like you may faint. · You feel depressed, or you lose touch with reality. Watch closely for changes in your health, and be sure to contact your doctor if:  · You do not get better as expected. Where can you learn more? Go to http://dorita-nara.info/. Enter G358 in the search box to learn more about \"Hypokalemia: Care Instructions. \"  Current as of: July 28, 2016  Content Version: 11.2  © 1471-9193 [employer]. Care instructions adapted under license by [employer] (which disclaims liability or warranty for this information). If you have questions about a medical condition or this instruction, always ask your healthcare professional. [address] any warranty or liability for your use of this information. Migraine Headache: Care Instructions  Your Care Instructions  Migraines are painful, throbbing headaches that often start on one side of the head. They may cause nausea and vomiting and make you sensitive to light, sound, or smell. Without treatment, migraines can last from 4 hours to a few days. Medicines can help prevent migraines or stop them after they have started. Your doctor can help you find which ones work best for you. Follow-up care is a key part of your treatment and safety. Be sure to make and go to all appointments, and call your doctor if you are having problems. It's also a good idea to know your test results and keep a list of the medicines you take. How can you care for yourself at home? · Do not drive if you have taken a prescription pain medicine. · Rest in a quiet, dark room until your headache is gone. Close your eyes, and try to relax or go to sleep. Don't watch TV or read. · Put a cold, moist cloth or cold pack on the painful area for 10 to 20 minutes at a time. Put a thin cloth between the cold pack and your skin. · Use a warm, moist towel or a heating pad set on low to relax tight shoulder and neck muscles. · Have someone gently massage your neck and shoulders. · Take your medicines exactly as prescribed. Call your doctor if you think you are having a problem with your medicine. You will get more details on the specific medicines your doctor prescribes. · Be careful not to take pain medicine more often than the instructions allow. You could get worse or more frequent headaches when the medicine wears off. To prevent migraines  · Keep a headache diary so you can figure out what triggers your headaches. Avoiding triggers may help you prevent headaches. Record when each headache began, how long it lasted, and what the pain was like.  (Was it throbbing, aching, stabbing, or dull?) Write down any other symptoms you had with the headache, such as nausea, flashing lights or dark spots, or sensitivity to bright light or loud noise. Note if the headache occurred near your period. List anything that might have triggered the headache. Triggers may include certain foods (chocolate, cheese, wine) or odors, smoke, bright light, stress, or lack of sleep. · If your doctor has prescribed medicine for your migraines, take it as directed. You may have medicine that you take only when you get a migraine and medicine that you take all the time to help prevent migraines. ¨ If your doctor has prescribed medicine for when you get a headache, take it at the first sign of a migraine, unless your doctor has given you other instructions. ¨ If your doctor has prescribed medicine to prevent migraines, take it exactly as prescribed. Call your doctor if you think you are having a problem with your medicine. · Find healthy ways to deal with stress. Migraines are most common during or right after stressful times. Take time to relax before and after you do something that has caused a migraine in the past.  · Try to keep your muscles relaxed by keeping good posture. Check your jaw, face, neck, and shoulder muscles for tension. Try to relax them. When you sit at a desk, change positions often. And make sure to stretch for 30 seconds each hour. · Get plenty of sleep and exercise. · Eat meals on a regular schedule. Avoid foods and drinks that often trigger migraines. These include chocolate, alcohol (especially red wine and port), aspartame, monosodium glutamate (MSG), and some additives found in foods (such as hot dogs, currie, cold cuts, aged cheeses, and pickled foods). · Limit caffeine. Don't drink too much coffee, tea, or soda. But don't quit caffeine suddenly. That can also give you migraines. · Do not smoke or allow others to smoke around you. If you need help quitting, talk to your doctor about stop-smoking programs and medicines. These can increase your chances of quitting for good.   · If you are taking birth control pills or hormone therapy, talk to your doctor about whether they are triggering your migraines. When should you call for help? Call 911 anytime you think you may need emergency care. For example, call if:  · You have signs of a stroke. These may include:  ¨ Sudden numbness, paralysis, or weakness in your face, arm, or leg, especially on only one side of your body. ¨ Sudden vision changes. ¨ Sudden trouble speaking. ¨ Sudden confusion or trouble understanding simple statements. ¨ Sudden problems with walking or balance. ¨ A sudden, severe headache that is different from past headaches. Call your doctor now or seek immediate medical care if:  · You have new or worse nausea and vomiting. · You have a new or higher fever. · Your headache gets much worse. Watch closely for changes in your health, and be sure to contact your doctor if:  · You are not getting better after 2 days (48 hours). Where can you learn more? Go to http://doritaLogoGardennara.info/. Enter U333 in the search box to learn more about \"Migraine Headache: Care Instructions. \"  Current as of: October 14, 2016  Content Version: 11.2  © 9199-4091 [employer]. Care instructions adapted under license by [employer] (which disclaims liability or warranty for this information). If you have questions about a medical condition or this instruction, always ask your healthcare professional. [address] any warranty or liability for your use of this information. Nausea and Vomiting: Care Instructions  Your Care Instructions    When you are nauseated, you may feel weak and sweaty and notice a lot of saliva in your mouth. Nausea often leads to vomiting. Most of the time you do not need to worry about nausea and vomiting, but they can be signs of other illnesses. Two common causes of nausea and vomiting are stomach flu and food poisoning.  Nausea and vomiting from viral stomach flu will usually start to improve within 24 hours. Nausea and vomiting from food poisoning may last from 12 to 48 hours. The doctor has checked you carefully, but problems can develop later. If you notice any problems or new symptoms, get medical treatment right away. Follow-up care is a key part of your treatment and safety. Be sure to make and go to all appointments, and call your doctor if you are having problems. It's also a good idea to know your test results and keep a list of the medicines you take. How can you care for yourself at home? · To prevent dehydration, drink plenty of fluids, enough so that your urine is light yellow or clear like water. Choose water and other caffeine-free clear liquids until you feel better. If you have kidney, heart, or liver disease and have to limit fluids, talk with your doctor before you increase the amount of fluids you drink. · Rest in bed until you feel better. · When you are able to eat, try clear soups, mild foods, and liquids until all symptoms are gone for 12 to 48 hours. Other good choices include dry toast, crackers, cooked cereal, and gelatin dessert, such as Jell-O. When should you call for help? Call 911 anytime you think you may need emergency care. For example, call if:  · You passed out (lost consciousness). Call your doctor now or seek immediate medical care if:  · You have symptoms of dehydration, such as:  ¨ Dry eyes and a dry mouth. ¨ Passing only a little dark urine. ¨ Feeling thirstier than usual.  · You have new or worsening belly pain. · You have a new or higher fever. · You vomit blood or what looks like coffee grounds. Watch closely for changes in your health, and be sure to contact your doctor if:  · You have ongoing nausea and vomiting. · Your vomiting is getting worse. · Your vomiting lasts longer than 2 days. · You are not getting better as expected. Where can you learn more?   Go to http://claudia.info/. Enter 25 [id number] in the search box to learn more about \"Nausea and Vomiting: Care Instructions. \"  Current as of: May 27, 2016  Content Version: 11.2  © 0591-4418 [employer]. Care instructions adapted under license by [employer] (which disclaims liability or warranty for this information). If you have questions about a medical condition or this instruction, always ask your healthcare professional. [address] any warranty or liability for your use of this information.

## 2017-06-04 NOTE — ED NOTES
Pt rang out and complaining of epigastric pain. Pt reports hx of GERD. Dr. Fredy Aden notified, and verbal orders for 20 mg IV Pepcid given, pt to be medicated.

## 2017-06-04 NOTE — ED NOTES
Discharge instructions reviewed with patient. Discharge instructions given to patient per  [facility]. Patient able to return/verbalize discharge instructions. Copy of discharge instructions provided. Patient condition stable, respiratory status within normal limits, neuro status intact. Ambulatory out of ER, accompanied by .

## 2017-06-05 ENCOUNTER — PATIENT OUTREACH (OUTPATIENT)
Dept: OTHER | Age: 37
End: 2017-06-05

## 2017-06-05 NOTE — PROGRESS NOTES
email sent to patient:    Ms. Kadi Henriquez,     It was very nice speaking with you today. As we discussed, I am sending the links to the [facility] Page, as well as the [facility] Provider Search Page. With the [employer], the greatest saving to you is when you see a [facility] provider. If you prefer to see a different, in-network provider, you may use the following link to search for your area: [email or web address]  Or contact the [facility] Member Services Number at: [phone]    Please know that the back of your insurance card lists copays for [address] versus in-network providers. The [facility] also has a lot of great information about the services provided by [facility] in your area: [email or web address]. You can search for [facility] specialists through this link as well. If I can be of any further support, please contact me either by telephone or email.     Sincerely,    Du Garcia

## 2017-06-05 NOTE — PROGRESS NOTES
Telephone outreach to patient for [facility] follow-up for 06/04/17 ER visit for acute chest pain/ migraine headache. Identity confirmed with two identifiers. Patient reviewed ER course. Care Plan:  Red Flags: reviewed with patient. She denies all. Reports no further chest pain. Reviewed importance of returning to ER for any red flag symptoms. Patient verbalized understanding. Medications: reconciliation completed. Discussed medication side effects- including drowsiness with both percocet and phenergan. Ms. Jeronimo Kumar reports she took both yesterday, with relief of nausea. She reports she feels as if she has a rebound headache today. We discussed acetaminophen in both fioricet and percocet and importance of not taking these medications with each other. Reviewed side effects of potassium. Reviewed taking with full meal to decrease potential of stomach upset. General Well-Being: Ms. Jeronimo Kumar denies any further chest pain. She denies any red flag symptoms. She does report a \"rebound headache\". However, states she does not require medication at this time for management of the pain. Ms. Jeronimo Kumar denies having a neurologist. We will send a link to the [facility] provider search page as well as a link to the [facility] provider search page, should she wish to establish with one in the future. She was encouraged to also consider keeping a headache journal, should this persist.    Follow-Up: Ms. Jeronimo Kumar was encouraged to contact her PCP and cardiologist today for ER follow-up. We reviewed her elevated blood pressure in ER. We reviewed importance of following ER visit with PCP or specialty appointment. Ms. Jeronimo Kumar verbalized understanding.

## 2017-06-21 ENCOUNTER — PATIENT MESSAGE (OUTPATIENT)
Dept: RHEUMATOLOGY | Age: 37
End: 2017-06-21

## 2017-06-21 ENCOUNTER — TELEPHONE (OUTPATIENT)
Dept: RHEUMATOLOGY | Age: 37
End: 2017-06-21

## 2017-06-21 NOTE — TELEPHONE ENCOUNTER
Returned call to Ms. Hal s and was informed that she did see her pcp, was given a muscle relaxer and a steroid dose pack. Her pcp excused her from work for the rest of the week. Pt was encourage to make a f/u appt with Dr. Palomo Mathew. She acknowledged and was very thankfull for the call back.

## 2017-06-22 ENCOUNTER — TELEPHONE (OUTPATIENT)
Dept: RHEUMATOLOGY | Age: 37
End: 2017-06-22

## 2017-06-22 NOTE — TELEPHONE ENCOUNTER
----- Message from Pily Galdamez sent at 6/22/2017  2:24 PM EDT -----  Regarding: FW: Dr. Trinity Gross can you triage this lady. ----- Message -----     From: Estela Damon     Sent: 6/21/2017   8:15 AM       To: [facility] Front Office Pool  Subject: Dr. Shaun Monsivais is requesting an appt today due to lower L side back and thigh pain. Pt best contact [phone].

## 2017-06-22 NOTE — TELEPHONE ENCOUNTER
----- Message from Oli Moffett sent at 6/22/2017  2:24 PM EDT -----  Regarding: FW: Dr. Joseph Brown can you triage this lady. ----- Message -----     From: [place] Daily     Sent: 6/21/2017   8:15 AM       To: [facility] Front Office Pool  Subject: Dr. 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 Pt best contact [phone].

## 2017-07-14 ENCOUNTER — DOCUMENTATION ONLY (OUTPATIENT)
Dept: OTHER | Age: 37
End: 2017-07-14

## 2017-11-08 ENCOUNTER — OFFICE VISIT (OUTPATIENT)
Dept: FAMILY MEDICINE CLINIC | Age: 37
End: 2017-11-08

## 2017-11-08 VITALS
TEMPERATURE: 96.3 F | DIASTOLIC BLOOD PRESSURE: 80 MMHG | OXYGEN SATURATION: 100 % | SYSTOLIC BLOOD PRESSURE: 128 MMHG | RESPIRATION RATE: 16 BRPM | BODY MASS INDEX: 30 KG/M2 | HEIGHT: 62 IN | WEIGHT: 163 LBS | HEART RATE: 75 BPM

## 2017-11-08 DIAGNOSIS — J30.1 CHRONIC SEASONAL ALLERGIC RHINITIS DUE TO POLLEN: Primary | ICD-10-CM

## 2017-11-08 RX ORDER — LEVOCETIRIZINE DIHYDROCHLORIDE 5 MG/1
5 TABLET, FILM COATED ORAL DAILY
Qty: 30 TAB | Refills: 3 | Status: SHIPPED | OUTPATIENT
Start: 2017-11-08 | End: 2017-12-24

## 2017-11-08 RX ORDER — METOPROLOL TARTRATE 50 MG/1
TABLET ORAL 2 TIMES DAILY
COMMUNITY
End: 2018-06-25 | Stop reason: ALTCHOICE

## 2017-11-08 NOTE — MR AVS SNAPSHOT
Visit Information Date & Time Provider Department Dept. Phone Encounter #  
 11/8/2017 12:15 PM Sydney Meza, [address] [phone] [id number] Follow-up Instructions Return if symptoms worsen or fail to improve. Upcoming Health Maintenance Date Due Pneumococcal 19-64 Medium Risk (1 of 1 - PPSV23) 10/4/2000 DTaP/Tdap/Td series (1 - Tdap) 10/4/2002 PAP AKA CERVICAL CYTOLOGY 10/4/2002 Allergies as of 11/8/2017  Review Complete On: 11/8/2017 By: Sydney Meza NP Severity Noted Reaction Type Reactions Lisinopril  06/18/2014    Swelling Current Immunizations  Reviewed on 11/3/2015 Name Date Influenza Vaccine 10/3/2016, 10/26/2015 Influenza Vaccine Whole 8/23/2010 Not reviewed this visit You Were Diagnosed With   
  
 Codes Comments Chronic seasonal allergic rhinitis due to pollen    -  Primary ICD-10-CM: J30.1 ICD-9-CM: 477.0 Vitals BP Pulse Temp Resp Height(growth percentile) Weight(growth percentile) 128/80 75 96.3 °F (35.7 °C) (Oral) 16 5' 2\" (1.575 m) 163 lb (73.9 kg) LMP SpO2 BMI OB Status Smoking Status 10/23/2017 (Exact Date) 100% 29.81 kg/m2 Having regular periods Never Smoker Vitals History BMI and BSA Data Body Mass Index Body Surface Area  
 29.81 kg/m 2 1.8 m 2 Preferred Pharmacy Pharmacy Name Phone [facility] [phone] Your Updated Medication List  
  
   
This list is accurate as of: 11/8/17 12:39 PM.  Always use your most recent med list.  
  
  
  
  
 levocetirizine 5 mg tablet Commonly known as:  [place] Take 1 Tab by mouth daily. MAXALT PO Take 1 Tab by mouth as needed. Indications: migraines  
  
 metoprolol tartrate 50 mg tablet Commonly known as:  LOPRESSOR Take  by mouth two (2) times a day. ORTHO TRI-CYCLEN (28) 0.18/0.215/0.25 mg-35 mcg (28) Tab Generic drug:  norgestimate-ethinyl estradiol Take 1 Tab by mouth daily. sertraline 50 mg tablet Commonly known as:  ZOLOFT Take 50 mg by mouth daily. Prescriptions Sent to Pharmacy Refills  
 levocetirizine (XYZAL) 5 mg tablet 3 Sig: Take 1 Tab by mouth daily. Class: Normal  
 [facility]  #: [phone] Route: Oral  
  
Follow-up Instructions Return if symptoms worsen or fail to improve. Patient Instructions Allergies: Care Instructions Your Care Instructions Allergies occur when your body's defense system (immune system) overreacts to certain substances. The immune system treats a harmless substance as if it were a harmful germ or virus. Many things can cause this overreaction, including pollens, medicine, food, dust, animal dander, and mold. Allergies can be mild or severe. Mild allergies can be managed with home treatment. But medicine may be needed to prevent problems. Managing your allergies is an important part of staying healthy. Your doctor may suggest that you have allergy testing to help find out what is causing your allergies. When you know what things trigger your symptoms, you can avoid them. This can prevent allergy symptoms and other health problems. For severe allergies that cause reactions that affect your whole body (anaphylactic reactions), your doctor may prescribe a shot of epinephrine to carry with you in case you have a severe reaction. Learn how to give yourself the shot and keep it with you at all times. Make sure it is not . Follow-up care is a key part of your treatment and safety. Be sure to make and go to all appointments, and call your doctor if you are having problems. It's also a good idea to know your test results and keep a list of the medicines you take. How can you care for yourself at home? · If you have been told by your doctor that dust or dust mites are causing your allergy, decrease the dust around your bed: 
[facility] AUTHOR[employer] sheets, pillowcases, and other bedding in hot water every week. ¨ Use dust-proof covers for pillows, duvets, and mattresses. Avoid plastic covers because they tear easily and do not \"breathe. \" Wash as instructed on the label. ¨ Do not use any blankets and pillows that you do not need. ¨ Use blankets that you can wash in your washing machine. ¨ Consider removing drapes and carpets, which attract and hold dust, from your bedroom. · If you are allergic to house dust and mites, do not use home humidifiers. Your doctor can suggest ways you can control dust and mites. · Look for signs of cockroaches. Cockroaches cause allergic reactions. Use cockroach baits to get rid of them. Then, clean your home well. Cockroaches like areas where grocery bags, newspapers, empty bottles, or cardboard boxes are stored. Do not keep these inside your home, and keep trash and food containers sealed. Seal off any spots where cockroaches might enter your home. · If you are allergic to mold, get rid of furniture, rugs, and drapes that smell musty. Check for mold in the bathroom. · If you are allergic to outdoor pollen or mold spores, use air-conditioning. Change or clean all filters every month. Keep windows closed. · If you are allergic to pollen, stay inside when pollen counts are high. Use a vacuum  with a HEPA filter or a double-thickness filter at least two times each week. · Stay inside when air pollution is bad. Avoid paint fumes, perfumes, and other strong odors. · Avoid conditions that make your allergies worse. Stay away from smoke. Do not smoke or let anyone else smoke in your house. Do not use fireplaces or wood-burning stoves. · If you are allergic to your pets, change the air filter in your furnace every month. Use high-efficiency filters. · If you are allergic to pet dander, keep pets outside or out of your bedroom. Old carpet and cloth furniture can hold a lot of animal dander. You may need to replace them. When should you call for help? Give an epinephrine shot if: 
? · You think you are having a severe allergic reaction. ? · You have symptoms in more than one body area, such as mild nausea and an itchy mouth. ? After giving an epinephrine shot call 911, even if you feel better. ?Call 911 if: 
? · You have symptoms of a severe allergic reaction. These may include: 
¨ Sudden raised, red areas (hives) all over your body. ¨ Swelling of the throat, mouth, lips, or tongue. ¨ Trouble breathing. ¨ Passing out (losing consciousness). Or you may feel very lightheaded or suddenly feel weak, confused, or restless. ? · You have been given an epinephrine shot, even if you feel better. ?Call your doctor now or seek immediate medical care if: 
? · You have symptoms of an allergic reaction, such as: ¨ A rash or hives (raised, red areas on the skin). ¨ Itching. ¨ Swelling. ¨ Belly pain, nausea, or vomiting. ? Watch closely for changes in your health, and be sure to contact your doctor if: 
? · You do not get better as expected. Where can you learn more? Go to http://dorita-nara.info/. Enter A613 in the search box to learn more about \"Allergies: Care Instructions. \" Current as of: September 29, 2016 Content Version: 11.4 © 1396-8953 [employer]. Care instructions adapted under license by [employer] (which disclaims liability or warranty for this information). If you have questions about a medical condition or this instruction, always ask your healthcare professional. [address] any warranty or liability for your use of this information. Introducing [facility]! Dear Esequiel Locke: Thank you for requesting a [employer] account.   Our records indicate that you already have an active JDCPhosphate account. You can access your account anytime at [email or web address] Did you know that you can access your hospital and ER discharge instructions at any time in JDCPhosphate? You can also review all of your test results from your hospital stay or ER visit. Additional Information If you have questions, please visit the Frequently Asked Questions section of the JDC[employer]osphate website at [email or web address] Remember, JDCPhosphate is NOT to be used for urgent needs. For medical emergencies, dial 911. Now available from your iPhone and Android! Please provide this summary of care documentation to your next provider. Your primary care clinician is listed as Mireille Kapoor. If you have any questions after today's visit, please call [phone].

## 2017-11-08 NOTE — PROGRESS NOTES
Subjective:   Whitney Regan is a 40 y.o. female who complains of congestion, sore throat, nasal blockage, post nasal drip and dry cough for 7 days, gradually improving since that time. She denies a history of shortness of breath and wheezing. Evaluation to date: none. Treatment to date: OTC products. Patient does not smoke cigarettes. Relevant PMH: No pertinent additional PMH. 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  levocetirizine (XYZAL) 5 mg tablet Take 1 Tab by mouth daily. 30 Tab 3    RIZATRIPTAN BENZOATE (MAXALT PO) Take 1 Tab by mouth as needed. Indications: migraines      norgestimate-ethinyl estradiol (ORTHO TRI-CYCLEN, 28,) 0.18/0.215/0.25 mg-35 mcg (28) tablet Take 1 Tab by mouth daily.  sertraline (ZOLOFT) 50 mg tablet Take 50 mg by mouth daily.        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  Unspecified adverse effect of anesthesia     with C- section the spinal did not take and had to be put to sleep    Palacios-Parkinson-White syndrome     Marie-Parkinson-White syndrome      Past Surgical History:   Procedure Laterality Date     DELIVERY ONLY  2007    HX  SECTION      c section x 2    HX CHOLECYSTECTOMY      HX HEENT  As Child    \"tubes in each ear\" as child    HX SVT ABLATION      ,     HX TONSILLECTOMY  2007    tonsils     Family History   Problem Relation Age of Onset    Cancer Mother 40     ovarian    Migraines Mother     Heart Disease Father     Migraines Father     Cancer Maternal Grandfather      lung    Psychiatric Disorder Maternal Grandmother     Parkinson's Disease Paternal Grandfather     Migraines Sister     Cancer Paternal Grandmother      breast cancer    Asthma Child     Eczema Child      Social History   Substance Use Topics    Smoking status: Never Smoker    Smokeless tobacco: Never Used    Alcohol use 0.5 - 1.0 oz/week     1 - 2 Glasses of wine per week      Comment: 1        Review of Systems  Pertinent items are noted in HPI. Objective:     Visit Vitals    /80    Pulse 75    Temp 96.3 °F (35.7 °C) (Oral)    Resp 16    Ht 5' 2\" (1.575 m)    Wt 163 lb (73.9 kg)    LMP 10/23/2017 (Exact Date)    SpO2 100%    BMI 29.81 kg/m2     General:  alert, cooperative, no distress   Eyes: negative   Ears: normal TM's and external ear canals AU   Sinuses: Normal paranasal sinuses without tenderness   Mouth:  Lips, mucosa, and tongue normal. Teeth and gums normal and abnormal findings: mild oropharyngeal erythema   Neck: supple, symmetrical, trachea midline and no adenopathy. Heart: S1 and S2 normal, no murmurs noted. Lungs: clear to auscultation bilaterally   Abdomen: soft, non-tender.  Bowel sounds normal. No masses,  no organomegaly        Assessment/Plan:   viral upper respiratory illness  Allergic rhinitis    Suggested symptomatic OTC remedies. RTC prn. Discussed diagnosis and treatment of viral URIs. Discussed the importance of avoiding unnecessary antibiotic therapy. ICD-10-CM ICD-9-CM    1. Chronic seasonal allergic rhinitis due to pollen J30.1 477.0 levocetirizine (XYZAL) 5 mg tablet   .

## 2017-11-08 NOTE — PATIENT INSTRUCTIONS
Allergies: Care Instructions  Your Care Instructions    Allergies occur when your body's defense system (immune system) overreacts to certain substances. The immune system treats a harmless substance as if it were a harmful germ or virus. Many things can cause this overreaction, including pollens, medicine, food, dust, animal dander, and mold. Allergies can be mild or severe. Mild allergies can be managed with home treatment. But medicine may be needed to prevent problems. Managing your allergies is an important part of staying healthy. Your doctor may suggest that you have allergy testing to help find out what is causing your allergies. When you know what things trigger your symptoms, you can avoid them. This can prevent allergy symptoms and other health problems. For severe allergies that cause reactions that affect your whole body (anaphylactic reactions), your doctor may prescribe a shot of epinephrine to carry with you in case you have a severe reaction. Learn how to give yourself the shot and keep it with you at all times. Make sure it is not . Follow-up care is a key part of your treatment and safety. Be sure to make and go to all appointments, and call your doctor if you are having problems. It's also a good idea to know your test results and keep a list of the medicines you take. How can you care for yourself at home? · If you have been told by your doctor that dust or dust mites are causing your allergy, decrease the dust around your bed:  [facility] AUTHOR[employer] sheets, pillowcases, and other bedding in hot water every week. ¨ Use dust-proof covers for pillows, duvets, and mattresses. Avoid plastic covers because they tear easily and do not \"breathe. \" Wash as instructed on the label. ¨ Do not use any blankets and pillows that you do not need. ¨ Use blankets that you can wash in your washing machine. ¨ Consider removing drapes and carpets, which attract and hold dust, from your bedroom.   · If you are allergic to house dust and mites, do not use home humidifiers. Your doctor can suggest ways you can control dust and mites. · Look for signs of cockroaches. Cockroaches cause allergic reactions. Use cockroach baits to get rid of them. Then, clean your home well. Cockroaches like areas where grocery bags, newspapers, empty bottles, or cardboard boxes are stored. Do not keep these inside your home, and keep trash and food containers sealed. Seal off any spots where cockroaches might enter your home. · If you are allergic to mold, get rid of furniture, rugs, and drapes that smell musty. Check for mold in the bathroom. · If you are allergic to outdoor pollen or mold spores, use air-conditioning. Change or clean all filters every month. Keep windows closed. · If you are allergic to pollen, stay inside when pollen counts are high. Use a vacuum  with a HEPA filter or a double-thickness filter at least two times each week. · Stay inside when air pollution is bad. Avoid paint fumes, perfumes, and other strong odors. · Avoid conditions that make your allergies worse. Stay away from smoke. Do not smoke or let anyone else smoke in your house. Do not use fireplaces or wood-burning stoves. · If you are allergic to your pets, change the air filter in your furnace every month. Use high-efficiency filters. · If you are allergic to pet dander, keep pets outside or out of your bedroom. Old carpet and cloth furniture can hold a lot of animal dander. You may need to replace them. When should you call for help? Give an epinephrine shot if:  ? · You think you are having a severe allergic reaction. ? · You have symptoms in more than one body area, such as mild nausea and an itchy mouth. ? After giving an epinephrine shot call 911, even if you feel better. ?Call 911 if:  ? · You have symptoms of a severe allergic reaction. These may include:  ¨ Sudden raised, red areas (hives) all over your body.   ¨ Swelling of the throat, mouth, lips, or tongue. ¨ Trouble breathing. ¨ Passing out (losing consciousness). Or you may feel very lightheaded or suddenly feel weak, confused, or restless. ? · You have been given an epinephrine shot, even if you feel better. ?Call your doctor now or seek immediate medical care if:  ? · You have symptoms of an allergic reaction, such as:  ¨ A rash or hives (raised, red areas on the skin). ¨ Itching. ¨ Swelling. ¨ Belly pain, nausea, or vomiting. ? Watch closely for changes in your health, and be sure to contact your doctor if:  ? · You do not get better as expected. Where can you learn more? Go to http://dorita-nara.info/. Enter Q501 in the search box to learn more about \"Allergies: Care Instructions. \"  Current as of: September 29, 2016  Content Version: 11.4  © 6136-3111 [employer]. Care instructions adapted under license by [employer] (which disclaims liability or warranty for this information). If you have questions about a medical condition or this instruction, always ask your healthcare professional. [place] [address] any warranty or liability for your use of this information.

## 2017-12-20 ENCOUNTER — OFFICE VISIT (OUTPATIENT)
Dept: FAMILY MEDICINE CLINIC | Age: 37
End: 2017-12-20

## 2017-12-20 ENCOUNTER — TELEPHONE (OUTPATIENT)
Dept: FAMILY MEDICINE CLINIC | Age: 37
End: 2017-12-20

## 2017-12-20 VITALS
BODY MASS INDEX: 31.17 KG/M2 | OXYGEN SATURATION: 100 % | SYSTOLIC BLOOD PRESSURE: 134 MMHG | HEART RATE: 85 BPM | DIASTOLIC BLOOD PRESSURE: 87 MMHG | RESPIRATION RATE: 16 BRPM | WEIGHT: 169.4 LBS | HEIGHT: 62 IN | TEMPERATURE: 98.9 F

## 2017-12-20 DIAGNOSIS — M43.6 TORTICOLLIS: ICD-10-CM

## 2017-12-20 DIAGNOSIS — J06.9 VIRAL URI: Primary | ICD-10-CM

## 2017-12-20 RX ORDER — BUTALBITAL, ACETAMINOPHEN, CAFFEINE AND CODEINE PHOSPHATE 50; 325; 40; 30 MG/1; MG/1; MG/1; MG/1
1 CAPSULE ORAL
COMMUNITY
End: 2018-06-25 | Stop reason: ALTCHOICE

## 2017-12-20 RX ORDER — CYCLOBENZAPRINE HCL 10 MG
10 TABLET ORAL
Qty: 10 TAB | Refills: 0 | Status: SHIPPED | OUTPATIENT
Start: 2017-12-20 | End: 2018-02-23 | Stop reason: ALTCHOICE

## 2017-12-20 NOTE — PROGRESS NOTES
Chief Complaint   Patient presents with    Sinus Pain     Nasal congestion, sinus pressure and pain and now unable to turn her neck.  Right side pain is excrutiating per patient

## 2017-12-24 ENCOUNTER — HOSPITAL ENCOUNTER (EMERGENCY)
Age: 37
Discharge: HOME | End: 2017-12-24
Attending: FAMILY MEDICINE

## 2017-12-24 VITALS
RESPIRATION RATE: 18 BRPM | SYSTOLIC BLOOD PRESSURE: 145 MMHG | WEIGHT: 172 LBS | HEART RATE: 76 BPM | HEIGHT: 62 IN | DIASTOLIC BLOOD PRESSURE: 89 MMHG | OXYGEN SATURATION: 98 % | TEMPERATURE: 98.6 F | BODY MASS INDEX: 31.65 KG/M2

## 2017-12-24 DIAGNOSIS — M62.838 MUSCLE SPASMS OF NECK: Primary | ICD-10-CM

## 2017-12-24 RX ORDER — KETOROLAC TROMETHAMINE 30 MG/ML
30 INJECTION, SOLUTION INTRAMUSCULAR; INTRAVENOUS
Status: COMPLETED | OUTPATIENT
Start: 2017-12-24 | End: 2017-12-24

## 2017-12-24 RX ORDER — NAPROXEN 500 MG/1
500 TABLET ORAL 2 TIMES DAILY WITH MEALS
Qty: 10 TAB | Refills: 0 | Status: SHIPPED | OUTPATIENT
Start: 2017-12-24 | End: 2017-12-29

## 2017-12-24 RX ORDER — ACETAMINOPHEN 500 MG
500 TABLET ORAL
Qty: 30 TAB | Refills: 0 | Status: SHIPPED | OUTPATIENT
Start: 2017-12-24 | End: 2019-01-14

## 2017-12-24 RX ADMIN — KETOROLAC TROMETHAMINE 30 MG: 30 INJECTION, SOLUTION INTRAMUSCULAR; INTRAVENOUS at 12:25

## 2017-12-24 NOTE — DISCHARGE INSTRUCTIONS
Neck Spasm: Care Instructions  Your Care Instructions  A neck spasm is sudden tightness and pain in your neck muscles. A spasm may be caused by some activities or repeated movements. For example, you may be more likely to have a neck spasm if you slouch, paint a ceiling, work at a computer, or sleep with your neck twisted. But the cause isn't always clear. Home treatment includes using heat or ice, taking over-the-counter (OTC) pain medicines, and avoiding activities that may lead to neck pain. Gentle stretching, or treatments such as massage or manipulation, may also help ease a neck spasm. For a neck spasm that doesn't get better with home care, your doctor may prescribe medicine. He or she may also suggest exercise or physical therapy to help strengthen or relax your neck muscles. Follow-up care is a key part of your treatment and safety. Be sure to make and go to all appointments, and call your doctor if you are having problems. It's also a good idea to know your test results and keep a list of the medicines you take. How can you care for yourself at home? · To relieve pain, use heat or ice (whichever feels better) on the affected area. ¨ Put a warm water bottle, a heating pad set on low, or a warm cloth on your neck. Put a thin cloth between the heating pad and your skin. Do not go to sleep with a heating pad on your skin. ¨ Try ice or a cold pack on the area for 10 to 20 minutes at a time. Put a thin cloth between the ice and your skin. · Ask your doctor if you can take acetaminophen (such as Tylenol) or nonsteroidal anti-inflammatory drugs, such as ibuprofen or naproxen. Your doctor can prescribe stronger medicines if needed. Be safe with medicines. Read and follow all instructions on the label. · Stretch your muscles every day, especially before and after exercise and at bedtime. Regular stretching can help relax your muscles.   · Try to find a pillow and a position in bed that help improve your night's rest.  · Try to stay active. It's best to start activity slowly. If an exercise makes your painworse, stop doing it. When should you call for help? Call 911 anytime you think you may need emergency care. For example, call if:  ? · You are unable to move an arm or a leg at all. ?Call your doctor now or seek immediate medical care if:  ? · You have new or worse symptoms in your arms, legs, belly, or buttocks. Symptoms may include:  ¨ Numbness or tingling. ¨ Weakness. ¨ Pain. ? · You lose bladder or bowel control. ? Watch closely for changes in your health, and be sure to contact your doctor if:  ? · You do not get better as expected. Where can you learn more? Go to http://dorita-nara.info/. Enter P361 in the search box to learn more about \"Neck Spasm: Care Instructions. \"  Current as of: March 21, 2017  Content Version: 11.4  © 4158-6722 [employer]. Care instructions adapted under license by [employer] (which disclaims liability or warranty for this information). If you have questions about a medical condition or this instruction, always ask your healthcare professional. [place] [address] any warranty or liability for your use of this information.

## 2017-12-24 NOTE — UC PROVIDER NOTE
HPI Comments:     Here for neck pain 8/10 right side and right side upper back (trapezius). It is stiff and hurts head to turn. Was seen for office visit on 2017; 4 days ago and given flexaril. This has not helped. Puts heat on with temporary relief. Overall not improving. No known injury. No other associated symptoms. Denies: weakness, numbness or tingling of extremitites. No headache or dizziness. Hx significant for NAZIA Lee      Patient is a 40 y.o. female presenting with neck pain. 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     Unspecified adverse effect of anesthesia     with C- section the spinal did not take and had to be put to sleep    Palacios-Parkinson-White syndrome     Marie-Parkinson-White syndrome         Past Surgical History:   Procedure Laterality Date     DELIVERY ONLY      HX  SECTION      c section x 2    HX CHOLECYSTECTOMY      HX HEENT  As Child    \"tubes in each ear\" as child    HX SVT ABLATION      ,     HX TONSILLECTOMY  2007    tonsils         Family History   Problem Relation Age of Onset    Cancer Mother 40     ovarian    Migraines Mother  Heart Disease Father     Migraines Father     Cancer Maternal Grandfather      lung    Psychiatric Disorder Maternal Grandmother     Parkinson's Disease Paternal Grandfather     Migraines Sister     Cancer Paternal Grandmother      breast cancer    Asthma Child     Eczema Child         Social History     Social History    Marital status:      Spouse name: N/A    Number of children: N/A    Years of education: N/A     Occupational History    Not on file. Social History Main Topics    Smoking status: Never Smoker    Smokeless tobacco: Never Used    Alcohol use 0.5 - 1.0 oz/week     1 - 2 Glasses of wine per week      Comment: 1    Drug use: No    Sexual activity: Yes     Partners: Male     Birth control/ protection: Pill     Other Topics Concern    Not on file     Social History Narrative                ALLERGIES: Lisinopril    Review of Systems   Constitutional: Negative for chills, fatigue and fever. Musculoskeletal: Positive for neck pain. All other systems reviewed and are negative. Vitals:    12/24/17 1115 12/24/17 1117   BP: 145/89    Pulse: 76    Resp: 18    Temp: 98.6 °F (37 °C)    SpO2: 98%    Weight:  78 kg (172 lb)   Height:  5' 2\" (1.575 m)       Physical Exam   Constitutional: She is oriented to person, place, and time. Appears to be in pain. She is crying. Eyes: EOM are normal. Pupils are equal, round, and reactive to light. Neck: Neck supple. No tracheal deviation present. Cardiovascular: Normal rate, regular rhythm and normal heart sounds. Exam reveals no gallop and no friction rub. No murmur heard. Pulmonary/Chest: Effort normal and breath sounds normal. No respiratory distress. She has no wheezes. She has no rales. Musculoskeletal:        Back:    Lymphadenopathy:     She has no cervical adenopathy. Neurological: She is alert and oriented to person, place, and time. No cranial nerve deficit. Coordination normal.   Skin: Skin is warm and dry. No rash noted. No erythema. Psychiatric: Thought content normal.       MDM     Differential Diagnosis; Clinical Impression; Plan:       CLINICAL IMPRESSION:  (Y42.101) Muscle spasms of neck  (primary encounter diagnosis)    Orders Placed This Encounter      ketorolac (TORADOL) injection 30 mg      acetaminophen (TYLENOL EXTRA STRENGTH) 500 mg tablet      naproxen (NAPROSYN) 500 mg tablet (start tomorrow)    Plan:  1. Review provided handout. 2. Continue flexaril, heat, massage, stretching  3. See new above orders. 4. ED for worsening pain or any new symptoms.     Risk of Significant Complications, Morbidity, and/or Mortality:   Presenting problems:  Low  Diagnostic procedures:  Low  Management options:  Low  Progress:   Patient progress:  Stable      Procedures

## 2017-12-26 ENCOUNTER — PATIENT OUTREACH (OUTPATIENT)
Dept: OTHER | Age: 37
End: 2017-12-26

## 2017-12-26 NOTE — PROGRESS NOTES
Transition Of Care Note    Patient discharged from [facility] on  for muscle spasm of neck. 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  Unspecified adverse effect of anesthesia     with C- section the spinal did not take and had to be put to sleep    Palacios-Parkinson-White syndrome     Marie-Parkinson-White syndrome        Care Manager contacted the patient by telephone to perform post UC discharge assessment. Verified  and zip code with patient as identifiers. Provided introduction to self, and explanation of the Nurse Care Manager role. Medication:   Performed medication reconciliation with patient, and patient verbalizes understanding of administration of home medications. There were no barriers to obtaining medications identified at this time. Support system:  patient    Discharge Instructions :  Reviewed discharge instructions with patient. Patient verbalizes understanding of discharge instructions and follow-up care. Red Flags: You have new or worse symptoms in your arms, legs, belly, or buttocks. Symptoms may include:  Numbness or tingling. Weakness. Pain.     Advance Care Planning: Patient was offered the opportunity to discuss advance care planning:  no     Does patient have an Advance Directive:  no   If no, did you provide information on Caring Connections?  no     PCP/Specialist follow up: Patient scheduled to follow up with Dr. Leeann Salcido on 1/5. Reviewed red flags with patient, and patient verbalizes understanding. Patient given an opportunity to ask questions. No other clinical/social/functional needs noted. The patient agrees to contact the PCP office for questions related to their healthcare. The patient expressed thanks, offered no additional questions and ended the call.

## 2018-01-08 NOTE — PROGRESS NOTES
HISTORY OF PRESENT ILLNESS  Yany Valenzuela is a 40 y.o. female. HPI   This lady complains of a 1 week hx of nasal congestion, bilateral maxillary sinus pressure and pain when she moves her neck sharply to the right. the neck pain has been for only a few days. No fever  Eating and drinking well    Review of Systems   Constitutional: Negative for chills and fever. HENT: Positive for congestion and sinus pain. Respiratory: Positive for cough and sputum production. Negative for shortness of breath. Cardiovascular: Negative for chest pain. Musculoskeletal: Positive for neck pain. Negative for myalgias. Physical Exam   Constitutional: She is oriented to person, place, and time. She appears well-developed and well-nourished. No distress. HENT:   Right Ear: External ear normal.   Left Ear: External ear normal.   Nose: Nose normal.   Mouth/Throat: Oropharynx is clear and moist. No oropharyngeal exudate. Eyes: Pupils are equal, round, and reactive to light. Cardiovascular: Normal rate, regular rhythm and normal heart sounds. No murmur heard. Pulmonary/Chest: Effort normal and breath sounds normal. No respiratory distress. She has no wheezes. Abdominal: There is no tenderness. Musculoskeletal: She exhibits no tenderness. Slightly decreased rom of neck secondary to pain, no ttp of the bony prominences/midline   Neurological: She is alert and oriented to person, place, and time. She has normal reflexes. She displays normal reflexes. No cranial nerve deficit. She exhibits normal muscle tone. Coordination normal.   Skin: She is not diaphoretic. ASSESSMENT and PLAN    ICD-10-CM ICD-9-CM    1. Viral URI J06.9 465.9     B97.89     2.  Torticollis M43.6 723.5    uri-rest, fluids-follow up if not better  Neck-mark from torticollis-pt states she likhimanshu slep wrong  Follow up if not better  Flexerl/motrin prn

## 2018-01-15 ENCOUNTER — PATIENT OUTREACH (OUTPATIENT)
Dept: OTHER | Age: 38
End: 2018-01-15

## 2018-02-08 ENCOUNTER — PATIENT OUTREACH (OUTPATIENT)
Dept: OTHER | Age: 38
End: 2018-02-08

## 2018-02-08 NOTE — PROGRESS NOTES
Resolving current episode for transitions of care due to patient lost to follow up. Patient has not been reached after repeated calls. Final call made today to attempt to contact and discreet message left on voicemail. Letter sent to patient notifying completion of services due to unable to reach. This writer's contact information and information regarding program services included in materials sent. Goals updated to reflect current status as appropriate. Will remain available should patient request re-initiation of case management or transitions of care services.

## 2018-02-08 NOTE — LETTER
2/8/2018 10:50 AM 
 
Ms. Keily Warren [phone] Dear Ms. Soraya Ackerman, My name is Ollie De La Garza, Employee Care Manager for [employer], and I have been trying to reach you. The Employee Care  is a free-of-charge, confidential service provided to our employees and their family members covered by the [employer]. Part of my job is to follow up with members who have recently been in the hospital or emergency room, to help them coordinate their care and answer questions they may have about their visit. I am able to provide assistance with medication questions, scheduling needed follow-up appointments, and arranging services like home health or home medical equipment. I can also provide education regarding your hospital or ER visit as well as your medical conditions. As healthcare providers, we know that patients do better when they have close follow up with a primary care provider (PCP), especially after a hospital or emergency department visit. If you do not have a PCP, I can help you find one that is convenient to you and covered by your insurance. I can also help you understand any after visit instructions, such as what symptoms to watch out for, or any new or changed medications. Remember that you can access your After Visit Summary by logging into your [employer] account. If you do not have a [employer] account, I can help you request access. Our program is designed to provide you with the opportunity to have a [employer] care manager partner with you for your healthcare needs. Please contact me at the below number if I can provide you with assistance for any of the above services. Sincerely, Ollie De La Garza RN, BSN  Employee Care Manager [address] [place] [address], [address] [place] [address] Road [address] [place] [address] Cell [phone] [email or web address] 
 Carson VALDES [email or web address]

## 2018-02-23 ENCOUNTER — OFFICE VISIT (OUTPATIENT)
Dept: FAMILY MEDICINE CLINIC | Age: 38
End: 2018-02-23

## 2018-02-23 VITALS
WEIGHT: 172.2 LBS | BODY MASS INDEX: 31.69 KG/M2 | OXYGEN SATURATION: 98 % | HEART RATE: 78 BPM | SYSTOLIC BLOOD PRESSURE: 117 MMHG | HEIGHT: 62 IN | RESPIRATION RATE: 16 BRPM | TEMPERATURE: 98 F | DIASTOLIC BLOOD PRESSURE: 74 MMHG

## 2018-02-23 DIAGNOSIS — J06.9 VIRAL UPPER RESPIRATORY TRACT INFECTION: Primary | ICD-10-CM

## 2018-02-23 DIAGNOSIS — J02.9 SORE THROAT: ICD-10-CM

## 2018-02-23 LAB
S PYO AG THROAT QL: NEGATIVE
VALID INTERNAL CONTROL?: YES

## 2018-02-23 RX ORDER — ZOLPIDEM TARTRATE 5 MG/1
TABLET ORAL
Refills: 0 | COMMUNITY
Start: 2018-01-19 | End: 2018-06-25 | Stop reason: ALTCHOICE

## 2018-02-23 RX ORDER — FLUTICASONE PROPIONATE 50 MCG
SPRAY, SUSPENSION (ML) NASAL
Refills: 5 | COMMUNITY
Start: 2018-01-19 | End: 2019-01-14

## 2018-02-23 RX ORDER — RIZATRIPTAN BENZOATE 10 MG/1
TABLET ORAL
Refills: 5 | COMMUNITY
Start: 2018-01-19 | End: 2019-01-14 | Stop reason: SDUPTHER

## 2018-02-23 RX ORDER — METOPROLOL SUCCINATE 25 MG/1
TABLET, EXTENDED RELEASE ORAL
Refills: 2 | COMMUNITY
Start: 2018-01-09 | End: 2018-06-25 | Stop reason: ALTCHOICE

## 2018-02-23 RX ORDER — METOPROLOL TARTRATE 25 MG/1
TABLET, FILM COATED ORAL
Refills: 1 | COMMUNITY
Start: 2017-12-09 | End: 2018-02-23 | Stop reason: ALTCHOICE

## 2018-02-23 NOTE — PATIENT INSTRUCTIONS
Upper Respiratory Infection (Cold): Care Instructions  Your Care Instructions    An upper respiratory infection, or URI, is an infection of the nose, sinuses, or throat. URIs are spread by coughs, sneezes, and direct contact. The common cold is the most frequent kind of URI. The flu and sinus infections are other kinds of URIs. Almost all URIs are caused by viruses. Antibiotics won't cure them. But you can treat most infections with home care. This may include drinking lots of fluids and taking over-the-counter pain medicine. You will probably feel better in 4 to 10 days. The doctor has checked you carefully, but problems can develop later. If you notice any problems or new symptoms, get medical treatment right away. Follow-up care is a key part of your treatment and safety. Be sure to make and go to all appointments, and call your doctor if you are having problems. It's also a good idea to know your test results and keep a list of the medicines you take. How can you care for yourself at home? · To prevent dehydration, drink plenty of fluids, enough so that your urine is light yellow or clear like water. Choose water and other caffeine-free clear liquids until you feel better. If you have kidney, heart, or liver disease and have to limit fluids, talk with your doctor before you increase the amount of fluids you drink. · Take an over-the-counter pain medicine, such as acetaminophen (Tylenol), ibuprofen (Advil, Motrin), or naproxen (Aleve). Read and follow all instructions on the label. · Before you use cough and cold medicines, check the label. These medicines may not be safe for young children or for people with certain health problems. · Be careful when taking over-the-counter cold or flu medicines and Tylenol at the same time. Many of these medicines have acetaminophen, which is Tylenol. Read the labels to make sure that you are not taking more than the recommended dose.  Too much acetaminophen (Tylenol) can be harmful. · Get plenty of rest.  · Do not smoke or allow others to smoke around you. If you need help quitting, talk to your doctor about stop-smoking programs and medicines. These can increase your chances of quitting for good. When should you call for help? Call 911 anytime you think you may need emergency care. For example, call if:  ? · You have severe trouble breathing. ?Call your doctor now or seek immediate medical care if:  ? · You seem to be getting much sicker. ? · You have new or worse trouble breathing. ? · You have a new or higher fever. ? · You have a new rash. ? Watch closely for changes in your health, and be sure to contact your doctor if:  ? · You have a new symptom, such as a sore throat, an earache, or sinus pain. ? · You cough more deeply or more often, especially if you notice more mucus or a change in the color of your mucus. ? · You do not get better as expected. Where can you learn more? Go to http://dorita-nara.info/. Enter K846 in the search box to learn more about \"Upper Respiratory Infection (Cold): Care Instructions. \"  Current as of: May 12, 2017  Content Version: 11.4  © 6187-5129 Healthwise, Incorporated. Care instructions adapted under license by [employer] (which disclaims liability or warranty for this information). If you have questions about a medical condition or this instruction, always ask your healthcare professional. [place] [address] any warranty or liability for your use of this information.

## 2018-02-23 NOTE — MR AVS SNAPSHOT
[facility], [place], [facility] 
 
 
 [address] [facility], [facility] 104 1400 [address] [place] [address] 
[phone] Patient: Pacheco Connors MRN: [id number] :1980 Visit Information Date & Time Provider Department Dept. Phone Encounter #  
 2018 10:15 AM Barber Amarjit, [id number] [place] [phone] [id number] Follow-up Instructions Return if symptoms worsen or fail to improve. Upcoming Health Maintenance Date Due DTaP/Tdap/Td series (1 - Tdap) 10/4/2002 PAP AKA CERVICAL CYTOLOGY 10/4/2002 Allergies as of 2018  Review Complete On: 2018 By: Elke Palomares LPN Severity Noted Reaction Type Reactions Lisinopril  2014    Swelling Current Immunizations  Reviewed on 11/3/2015 Name Date Influenza Vaccine 10/3/2016, 10/26/2015 Influenza Vaccine Whole 2010 Not reviewed this visit You Were Diagnosed With   
  
 Codes Comments Viral upper respiratory tract infection    -  Primary ICD-10-CM: J06.9, B97.89 ICD-9-CM: 465.9 Sore throat     ICD-10-CM: J02.9 ICD-9-CM: 501 Vitals OB Status Smoking Status Having regular periods Never Smoker Preferred Pharmacy Pharmacy Name Phone [facility] [phone] Your Updated Medication List  
  
   
This list is accurate as of 18 10:42 AM.  Always use your most recent med list.  
  
  
  
  
 acetaminophen 500 mg tablet Commonly known as:  [address]  Take 1 Tab by mouth every six (6) hours as needed for Pain.  
  
 codeine-butalbital-acetaminophen-caffeine -79-30 mg capsule Commonly known as:  FIORICET WITH CODEINE Take 1 Cap by mouth every six (6) hours as needed for Headache. fluticasone 50 mcg/actuation nasal spray Commonly known as:  FLONASE  
USE 1 SPRAY IN EACH NOSTRIL ONCE DAILY  
  
 metoprolol succinate 25 mg XL tablet Commonly known as:  TOPROL-XL Take one as needed for palpatations  
  
 metoprolol tartrate 50 mg tablet Commonly known as:  LOPRESSOR Take  by mouth two (2) times a day. ORTHO TRI-CYCLEN (28) 0.18/0.215/0.25 mg-35 mcg (28) Tab Generic drug:  norgestimate-ethinyl estradiol Take 1 Tab by mouth daily. rizatriptan 10 mg tablet Commonly known as:  Rachel Sin TAKE 1 TABLET BY MOUTH STAT, MAY REPEAT IN 2 HOURS IF NEEDED. MAX: 2 TABS DAILY  
  
 sertraline 50 mg tablet Commonly known as:  ZOLOFT Take 50 mg by mouth daily. zolpidem 5 mg tablet Commonly known as:  AMBIEN  
TAKE 1 TABLET BY MOUTH AT BEDTIME AS NEEDED We Performed the Following AMB POC RAPID STREP A [26557 CPT(R)] Follow-up Instructions Return if symptoms worsen or fail to improve. Patient Instructions Upper Respiratory Infection (Cold): Care Instructions Your Care Instructions An upper respiratory infection, or URI, is an infection of the nose, sinuses, or throat. URIs are spread by coughs, sneezes, and direct contact. The common cold is the most frequent kind of URI. The flu and sinus infections are other kinds of URIs. Almost all URIs are caused by viruses. Antibiotics won't cure them. But you can treat most infections with home care. This may include drinking lots of fluids and taking over-the-counter pain medicine. You will probably feel better in 4 to 10 days. The doctor has checked you carefully, but problems can develop later. If you notice any problems or new symptoms, get medical treatment right away. Follow-up care is a key part of your treatment and safety. Be sure to make and go to all appointments, and call your doctor if you are having problems. It's also a good idea to know your test results and keep a list of the medicines you take. How can you care for yourself at home?  
· To prevent dehydration, drink plenty of fluids, enough so that your urine is light yellow or clear like water. Choose water and other caffeine-free clear liquids until you feel better. If you have kidney, heart, or liver disease and have to limit fluids, talk with your doctor before you increase the amount of fluids you drink. · Take an over-the-counter pain medicine, such as acetaminophen (Tylenol), ibuprofen (Advil, Motrin), or naproxen (Aleve). Read and follow all instructions on the label. · Before you use cough and cold medicines, check the label. These medicines may not be safe for young children or for people with certain health problems. · Be careful when taking over-the-counter cold or flu medicines and Tylenol at the same time. Many of these medicines have acetaminophen, which is Tylenol. Read the labels to make sure that you are not taking more than the recommended dose. Too much acetaminophen (Tylenol) can be harmful. · Get plenty of rest. 
· Do not smoke or allow others to smoke around you. If you need help quitting, talk to your doctor about stop-smoking programs and medicines. These can increase your chances of quitting for good. When should you call for help? Call 911 anytime you think you may need emergency care. For example, call if: 
? · You have severe trouble breathing. ?Call your doctor now or seek immediate medical care if: 
? · You seem to be getting much sicker. ? · You have new or worse trouble breathing. ? · You have a new or higher fever. ? · You have a new rash. ? Watch closely for changes in your health, and be sure to contact your doctor if: 
? · You have a new symptom, such as a sore throat, an earache, or sinus pain. ? · You cough more deeply or more often, especially if you notice more mucus or a change in the color of your mucus. ? · You do not get better as expected. Where can you learn more? Go to http://dorita-nara.info/.  
Enter N135 in the search box to learn more about \"Upper Respiratory Infection (Cold): Care Instructions. \" Current as of: May 12, 2017 Content Version: 11.4 © 9856-2163 [employer]. Care instructions adapted under license by [employer] (which disclaims liability or warranty for this information). If you have questions about a medical condition or this instruction, always ask your healthcare professional. [address] any warranty or liability for your use of this information. Introducing [facility]! Dear Allan Trent: Thank you for requesting a [employer] account. Our records indicate that you already have an active [employer] account. You can access your account anytime at [email or web address] Did you know that you can access your hospital and ER discharge instructions at any time in Blink Messenger? You can also review all of your test results from your hospital stay or ER visit. Additional Information If you have questions, please visit the Frequently Asked Questions section of the [employer] website at [email or web address] Remember, [employer] is NOT to be used for urgent needs. For medical emergencies, dial 911. Now available from your iPhone and Android! Please provide this summary of care documentation to your next provider. Your primary care clinician is listed as Apolinar Tellez. If you have any questions after today's visit, please call [phone].

## 2018-02-23 NOTE — PROGRESS NOTES
Chief Complaint   Patient presents with    Sore Throat     Sore throat, headache and bilateral ear fullness and pain

## 2018-02-23 NOTE — PROGRESS NOTES
Subjective:   Lela Elmore is a 40 y.o. female who complains of sore throat and nasal blockage for 3 days, rapidly improving since that time. She denies a history of shortness of breath and wheezing. Evaluation to date: none. Treatment to date: OTC products. Patient does not smoke cigarettes. Relevant PMH: No pertinent additional PMH. 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 MAX: 2 TABS DAILY  5    zolpidem (AMBIEN) 5 mg tablet TAKE 1 TABLET BY MOUTH AT BEDTIME AS NEEDED  0    acetaminophen (TYLENOL EXTRA STRENGTH) 500 mg tablet Take 1 Tab by mouth every six (6) hours as needed for Pain. 30 Tab 0    codeine-butalbital-acetaminophen-caffeine (FIORICET WITH CODEINE) -83-30 mg capsule Take 1 Cap by mouth every six (6) hours as needed for Headache.  metoprolol tartrate (LOPRESSOR) 50 mg tablet Take  by mouth two (2) times a day.  norgestimate-ethinyl estradiol (ORTHO TRI-CYCLEN, 28,) 0.18/0.215/0.25 mg-35 mcg (28) tablet Take 1 Tab by mouth daily.  sertraline (ZOLOFT) 50 mg tablet Take 50 mg by mouth daily.        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  Unspecified adverse effect of anesthesia     with C- section the spinal did not take and had to be put to sleep    Palacios-Parkinson-White syndrome     Marie-Parkinson-White syndrome      Past Surgical History:   Procedure Laterality Date     DELIVERY ONLY  2007    HX  SECTION      c section x 2    HX CHOLECYSTECTOMY      HX HEENT  As Child    \"tubes in each ear\" as child    HX SVT ABLATION      ,     HX TONSILLECTOMY  2007    tonsils     Family History   Problem Relation Age of Onset    Cancer Mother 40     ovarian    Migraines Mother     Heart Disease Father     Migraines Father     Cancer Maternal Grandfather      lung    Psychiatric Disorder Maternal Grandmother     Parkinson's Disease Paternal Grandfather     Migraines Sister     Cancer Paternal Grandmother      breast cancer    Asthma Child     Eczema Child      Social History   Substance Use Topics    Smoking status: Never Smoker    Smokeless tobacco: Never Used    Alcohol use 0.5 - 1.0 oz/week     1 - 2 Glasses of wine per week      Comment: 1        Review of Systems  Pertinent items are noted in HPI.     Objective:     Visit Vitals    /74    Pulse 78    Temp 98 °F (36.7 °C) (Oral)    Resp 16    Ht 5' 2\" (1.575 m)    Wt 172 lb 3.2 oz (78.1 kg)    LMP 2018 (Exact Date)    SpO2 98%    BMI 31.5 kg/m2     General:  alert, cooperative, no distress   Eyes: negative   Ears: Clear bilaterally Sinuses: Normal paranasal sinuses without tenderness   Mouth:  Lips, mucosa, and tongue normal. Teeth and gums normal   Neck: supple, symmetrical, trachea midline and no adenopathy. Heart: S1 and S2 normal, no murmurs noted. Lungs: clear to auscultation bilaterally   Abdomen: soft, non-tender. Bowel sounds normal. No masses,  no organomegaly        Assessment/Plan:   viral upper respiratory illness  Suggested symptomatic OTC remedies. RTC prn. Discussed diagnosis and treatment of viral URIs. Discussed the importance of avoiding unnecessary antibiotic therapy. ICD-10-CM ICD-9-CM    1. Viral upper respiratory tract infection J06.9 465.9     B97.89     2. Sore throat J02.9 462 AMB POC RAPID STREP A   .

## 2018-05-04 ENCOUNTER — HOSPITAL ENCOUNTER (OUTPATIENT)
Dept: MRI IMAGING | Age: 38
Discharge: HOME | End: 2018-05-04
Attending: ORTHOPAEDIC SURGERY
Payer: COMMERCIAL

## 2018-05-04 DIAGNOSIS — M25.562 LEFT KNEE PAIN: ICD-10-CM

## 2018-05-04 DIAGNOSIS — S83.282A TEAR OF LATERAL CARTILAGE OR MENISCUS OF KNEE, CURRENT, LEFT, INITIAL ENCOUNTER: ICD-10-CM

## 2018-05-04 PROCEDURE — 73721 MRI JNT OF LWR EXTRE W/O DYE: CPT

## 2018-06-25 ENCOUNTER — OFFICE VISIT (OUTPATIENT)
Dept: FAMILY MEDICINE CLINIC | Age: 38
End: 2018-06-25

## 2018-06-25 VITALS
RESPIRATION RATE: 16 BRPM | DIASTOLIC BLOOD PRESSURE: 86 MMHG | TEMPERATURE: 98 F | SYSTOLIC BLOOD PRESSURE: 121 MMHG | HEIGHT: 62 IN | OXYGEN SATURATION: 98 % | HEART RATE: 79 BPM | WEIGHT: 178.4 LBS | BODY MASS INDEX: 32.83 KG/M2

## 2018-06-25 DIAGNOSIS — H65.93 BILATERAL NON-SUPPURATIVE OTITIS MEDIA: Primary | ICD-10-CM

## 2018-06-25 RX ORDER — SERTRALINE HYDROCHLORIDE 50 MG/1
TABLET, FILM COATED ORAL
COMMUNITY
End: 2018-06-25 | Stop reason: ALTCHOICE

## 2018-06-25 RX ORDER — AMOXICILLIN AND CLAVULANATE POTASSIUM 875; 125 MG/1; MG/1
1 TABLET, FILM COATED ORAL EVERY 12 HOURS
Qty: 20 TAB | Refills: 0 | Status: SHIPPED | OUTPATIENT
Start: 2018-06-25 | End: 2018-07-05

## 2018-06-25 RX ORDER — SERTRALINE HYDROCHLORIDE 50 MG/1
TABLET, FILM COATED ORAL
Refills: 5 | COMMUNITY
Start: 2018-06-02 | End: 2019-01-14

## 2018-06-25 RX ORDER — NORGESTIMATE AND ETHINYL ESTRADIOL 7DAYSX3 28
KIT ORAL
COMMUNITY
End: 2020-06-08 | Stop reason: ALTCHOICE

## 2018-06-25 RX ORDER — FLECAINIDE ACETATE 50 MG/1
TABLET ORAL
Refills: 11 | COMMUNITY
Start: 2018-05-17 | End: 2019-01-14

## 2018-06-25 NOTE — PROGRESS NOTES
Subjective:      Chace Whittington is a 40 y.o. female who presents for possible ear infection. Symptoms include bilateral ear pain and congestion. Onset of symptoms was 2 days ago, unchanged since that time. Associated symptoms include achiness and congestion, which have been present for 3 days . She is drinking plenty of fluids. 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  Unspecified adverse effect of anesthesia     with C- section the spinal did not take and had to be put to sleep    Palacios-Parkinson-White syndrome     Marie-Parkinson-White syndrome      Allergies: Allergies   Allergen Reactions    Lisinopril Swelling    Morphine Other (comments)     Chest pain      Medications:     Current Outpatient Prescriptions   Medication Sig    norgestimate-ethinyl estradiol (TRI-PREVIFEM, 28,) 0.18/0.215/0.25 mg-35 mcg (28) tab Take  by mouth.     flecainide (TAMBOCOR) 50 mg tablet TAKE 1 TAB BY MOUTH EVERY 12 HOURS    amoxicillin-clavulanate (AUGMENTIN) 875-125 mg per tablet Take 1 Tab by mouth every twelve (12) hours for 10 days.  fluticasone (FLONASE) 50 mcg/actuation nasal spray USE 1 SPRAY IN EACH NOSTRIL ONCE DAILY    rizatriptan (MAXALT) 10 mg tablet TAKE 1 TABLET BY MOUTH STAT, MAY REPEAT IN 2 HOURS IF NEEDED. MAX: 2 TABS DAILY    acetaminophen (TYLENOL EXTRA STRENGTH) 500 mg tablet Take 1 Tab by mouth every six (6) hours as needed for Pain.  sertraline (ZOLOFT) 50 mg tablet TAKE 1 & 1/2 TABLETS BY MOUTH ONCE DAILY     No current facility-administered medications for this visit. Surgical History:     Past Surgical History:   Procedure Laterality Date     DELIVERY ONLY  2007    HX  SECTION      c section x 2    HX CHOLECYSTECTOMY      HX HEENT  As Child    \"tubes in each ear\" as child    HX SVT ABLATION      ,     HX TONSILLECTOMY  2007    tonsils     Social History:     Social History     Social History    Marital status:      Spouse name: N/A    Number of children: N/A    Years of education: N/A     Social History Main Topics    Smoking status: Never Smoker    Smokeless tobacco: Never Used    Alcohol use 0.5 - 1.0 oz/week     1 - 2 Glasses of wine per week      Comment: 1    Drug use: No    Sexual activity: Yes     Partners: Male     Birth control/ protection: Pill     Other Topics Concern    None     Social History Narrative         Objective:     ROS:   Feeling well. No dyspnea or chest pain on exertion. No abdominal pain, change in bowel habits, black or bloody stools. No urinary tract symptoms. GYN ROS: normal menses, no abnormal bleeding, pelvic pain or discharge, no breast pain or new or enlarging lumps on self exam. No neurological complaints. OBJECTIVE:   The patient appears well, alert, oriented x 3, in no distress.   Visit Vitals    /86 (BP 1 Location: Right arm, BP Patient Position: Sitting)    Pulse 79    Temp 98 °F (36.7 °C) (Oral)    Resp 16    Ht 5' 2\" (1.575 m)    Wt 178 lb 6.4 oz (80.9 kg)    LMP 2018 (Approximate)    SpO2 98%    BMI 32.63 kg/m2     HEENT:ENT bilateral TM's bulging, red. Neck supple. No adenopathy or thyromegaly. MARIAELENA. Chest: Lungs are clear, good air entry, no wheezes, rhonchi or rales. Cardiovascular: S1 and S2 normal, no murmurs, regular rate and rhythm. Abdomen: soft without tenderness, guarding, mass or organomegaly. Extremities: show no edema, normal peripheral pulses. Neurological: is normal, no focal findings. Assessment/Plan:       ICD-10-CM ICD-9-CM    1. Bilateral non-suppurative otitis media H65.93 381.4 amoxicillin-clavulanate (AUGMENTIN) 875-125 mg per tablet   .

## 2018-06-25 NOTE — PATIENT INSTRUCTIONS
Ear Infection (Otitis Media): Care Instructions  Your Care Instructions    An ear infection may start with a cold and affect the middle ear (otitis media). It can hurt a lot. Most ear infections clear up on their own in a couple of days. Most often you will not need antibiotics. This is because many ear infections are caused by a virus. Antibiotics don't work against a virus. Regular doses of pain medicines are the best way to reduce your fever and help you feel better. Follow-up care is a key part of your treatment and safety. Be sure to make and go to all appointments, and call your doctor if you are having problems. It's also a good idea to know your test results and keep a list of the medicines you take. How can you care for yourself at home? · Take pain medicines exactly as directed. ¨ If the doctor gave you a prescription medicine for pain, take it as prescribed. ¨ If you are not taking a prescription pain medicine, take an over-the-counter medicine, such as acetaminophen (Tylenol), ibuprofen (Advil, Motrin), or naproxen (Aleve). Read and follow all instructions on the label. ¨ Do not take two or more pain medicines at the same time unless the doctor told you to. Many pain medicines have acetaminophen, which is Tylenol. Too much acetaminophen (Tylenol) can be harmful. · Plan to take a full dose of pain reliever before bedtime. Getting enough sleep will help you get better. · Try a warm, moist washcloth on the ear. It may help relieve pain. · If your doctor prescribed antibiotics, take them as directed. Do not stop taking them just because you feel better. You need to take the full course of antibiotics. When should you call for help? Call your doctor now or seek immediate medical care if:  ? · You have new or increasing ear pain. ? · You have new or increasing pus or blood draining from your ear. ? · You have a fever with a stiff neck or a severe headache. ? Watch closely for changes in your health, and be sure to contact your doctor if:  ? · You have new or worse symptoms. ? · You are not getting better after taking an antibiotic for 2 days. Where can you learn more? Go to http://dorita-nara.info/. Enter Z622 in the search box to learn more about \"Ear Infection (Otitis Media): Care Instructions. \"  Current as of: May 12, 2017  Content Version: 11.4  © 3411-7047 [employer]. Care instructions adapted under license by [employer] (which disclaims liability or warranty for this information). If you have questions about a medical condition or this instruction, always ask your healthcare professional. [place] [address] any warranty or liability for your use of this information.

## 2018-06-25 NOTE — PROGRESS NOTES
Chief Complaint   Patient presents with    Pressure Behind the Eyes     Sinus pressure and pain since Friday with pain in the right ear.

## 2018-06-25 NOTE — MR AVS SNAPSHOT
[address] [facility] [address] 
 
 
 [address] [facility] [place] [address] 
[phone] Patient: Yosef Granado MRN: [id number] :1980 Visit Information Date & Time Provider Department Dept. Phone Encounter #  
 2018 10:00 AM Ghazal Green, [address] [facility] [phone] [id number] Follow-up Instructions Return if symptoms worsen or fail to improve. Upcoming Health Maintenance Date Due DTaP/Tdap/Td series (1 - Tdap) 10/4/2002 PAP AKA CERVICAL CYTOLOGY 10/4/2002 Influenza Age 5 to Adult 2018 Allergies as of 2018  Review Complete On: 2018 By: Camila Proctor LPN Severity Noted Reaction Type Reactions Lisinopril  2014    Swelling Morphine  04/15/2018    Other (comments) Chest pain Current Immunizations  Reviewed on 11/3/2015 Name Date Influenza Vaccine 10/3/2016, 10/26/2015 Influenza Vaccine Whole 2010 Not reviewed this visit You Were Diagnosed With   
  
 Codes Comments Bilateral non-suppurative otitis media    -  Primary ICD-10-CM: H65.93 
ICD-9-CM: 381. 4 Vitals BP Pulse Temp Resp Height(growth percentile) Weight(growth percentile) 121/86 (BP 1 Location: Right arm, BP Patient Position: Sitting) 79 98 °F (36.7 °C) (Oral) 16 5' 2\" (1.575 m) 178 lb 6.4 oz (80.9 kg) LMP SpO2 BMI OB Status Smoking Status 2018 (Approximate) 98% 32.63 kg/m2 Having regular periods Never Smoker Vitals History BMI and BSA Data Body Mass Index Body Surface Area  
 32.63 kg/m 2 1.88 m 2 Preferred Pharmacy Pharmacy Name Phone [facility] [phone] Your Updated Medication List  
  
   
This list is accurate as of 18 10:12 AM.  Always use your most recent med list.  
  
  
  
  
 acetaminophen 500 mg tablet Commonly known as:  [address] [facility]  
 Take 1 Tab by mouth every six (6) hours as needed for Pain.  
  
 amoxicillin-clavulanate 875-125 mg per tablet Commonly known as:  AUGMENTIN Take 1 Tab by mouth every twelve (12) hours for 10 days. flecainide 50 mg tablet Commonly known as:  TAMBOCOR  
TAKE 1 TAB BY MOUTH EVERY 12 HOURS  
  
 fluticasone 50 mcg/actuation nasal spray Commonly known as:  FLONASE  
USE 1 SPRAY IN EACH NOSTRIL ONCE DAILY  
  
 rizatriptan 10 mg tablet Commonly known as:  Simona Meter TAKE 1 TABLET BY MOUTH STAT, MAY REPEAT IN 2 HOURS IF NEEDED. MAX: 2 TABS DAILY  
  
 sertraline 50 mg tablet Commonly known as:  ZOLOFT  
TAKE 1 & 1/2 TABLETS BY MOUTH ONCE DAILY  
  
 TRI-PREVIFEM (28) 0.18/0.215/0.25 mg-35 mcg (28) Tab Generic drug:  norgestimate-ethinyl estradiol Take  by mouth. Prescriptions Sent to Pharmacy Refills  
 amoxicillin-clavulanate (AUGMENTIN) 875-125 mg per tablet 0 Sig: Take 1 Tab by mouth every twelve (12) hours for 10 days. Class: Normal  
 [facility] #: [phone] Route: Oral  
  
Follow-up Instructions Return if symptoms worsen or fail to improve. Patient Instructions Ear Infection (Otitis Media): Care Instructions Your Care Instructions An ear infection may start with a cold and affect the middle ear (otitis media). It can hurt a lot. Most ear infections clear up on their own in a couple of days. Most often you will not need antibiotics. This is because many ear infections are caused by a virus. Antibiotics don't work against a virus. Regular doses of pain medicines are the best way to reduce your fever and help you feel better. Follow-up care is a key part of your treatment and safety. Be sure to make and go to all appointments, and call your doctor if you are having problems. It's also a good idea to know your test results and keep a list of the medicines you take. How can you care for yourself at home? · Take pain medicines exactly as directed. ¨ If the doctor gave you a prescription medicine for pain, take it as prescribed. ¨ If you are not taking a prescription pain medicine, take an over-the-counter medicine, such as acetaminophen (Tylenol), ibuprofen (Advil, Motrin), or naproxen (Aleve). Read and follow all instructions on the label. ¨ Do not take two or more pain medicines at the same time unless the doctor told you to. Many pain medicines have acetaminophen, which is Tylenol. Too much acetaminophen (Tylenol) can be harmful. · Plan to take a full dose of pain reliever before bedtime. Getting enough sleep will help you get better. · Try a warm, moist washcloth on the ear. It may help relieve pain. · If your doctor prescribed antibiotics, take them as directed. Do not stop taking them just because you feel better. You need to take the full course of antibiotics. When should you call for help? Call your doctor now or seek immediate medical care if: 
? · You have new or increasing ear pain. ? · You have new or increasing pus or blood draining from your ear. ? · You have a fever with a stiff neck or a severe headache. ? Watch closely for changes in your health, and be sure to contact your doctor if: 
? · You have new or worse symptoms. ? · You are not getting better after taking an antibiotic for 2 days. Where can you learn more? Go to http://dorita-nara.info/. Enter I829 in the search box to learn more about \"Ear Infection (Otitis Media): Care Instructions. \" Current as of: May 12, 2017 Content Version: 11.4 © 7912-4437 [employer]. Care instructions adapted under license by [employer] (which disclaims liability or warranty for this information).  If you have questions about a medical condition or this instruction, always ask your healthcare professional. [employer] disclaims any warranty or liability for your use of this information. Introducing [facility]! Dear Fartun Temple: Thank you for requesting a Adhysteria account. Our records indicate that you already have an active Adhysteria account. You can access your account anytime at [email or web address] Did you know that you can access your hospital and ER discharge instructions at any time in Adhysteria? You can also review all of your test results from your hospital stay or ER visit. Additional Information If you have questions, please visit the Frequently Asked Questions section of the Adhysteria website at [email or web address] Remember, Adhysteria is NOT to be used for urgent needs. For medical emergencies, dial 911. Now available from your iPhone and Android! Please provide this summary of care documentation to your next provider. Your primary care clinician is listed as Rafal Smith. If you have any questions after today's visit, please call [phone].

## 2018-07-27 ENCOUNTER — OFFICE VISIT (OUTPATIENT)
Dept: FAMILY MEDICINE CLINIC | Age: 38
End: 2018-07-27

## 2018-07-27 VITALS
WEIGHT: 177.4 LBS | DIASTOLIC BLOOD PRESSURE: 93 MMHG | OXYGEN SATURATION: 100 % | BODY MASS INDEX: 32.65 KG/M2 | HEIGHT: 62 IN | HEART RATE: 67 BPM | RESPIRATION RATE: 16 BRPM | SYSTOLIC BLOOD PRESSURE: 131 MMHG | TEMPERATURE: 98.3 F

## 2018-07-27 DIAGNOSIS — M25.512 ACUTE PAIN OF LEFT SHOULDER: Primary | ICD-10-CM

## 2018-07-27 RX ORDER — CYCLOBENZAPRINE HCL 10 MG
10 TABLET ORAL
Qty: 30 TAB | Refills: 1 | Status: SHIPPED | OUTPATIENT
Start: 2018-07-27 | End: 2019-01-14

## 2018-07-27 RX ORDER — IBUPROFEN 600 MG/1
600 TABLET ORAL
Qty: 45 TAB | Refills: 1 | Status: SHIPPED | OUTPATIENT
Start: 2018-07-27 | End: 2019-02-28

## 2018-07-27 NOTE — PROGRESS NOTES
Chief Complaint   Patient presents with    Shoulder Pain     Several months of pain beneath left clavicle, now on the back of shoulder, top of shoulder and knot on back

## 2018-07-27 NOTE — PROGRESS NOTES
Subjective:      Nikunj Bowden is a 40 y.o. female seen for evaluation and treatment of a left shoulder injury. This is evaluated as a personal injury. The injury was sustained 2 months ago, and occurred while doing an unknown activity. She did not hear or sense a pop or snap at the time of the injury. The patient notes pain and no swelling since the injury. She has injured this site in the past.    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 30 Tab 1    ibuprofen (MOTRIN) 600 mg tablet Take 1 Tab by mouth every six (6) hours as needed for Pain. 45 Tab 1    norgestimate-ethinyl estradiol (TRI-PREVIFEM, 28,) 0.18/0.215/0.25 mg-35 mcg (28) tab Take  by mouth.  flecainide (TAMBOCOR) 50 mg tablet 1/2 pill in the am and 1/2 pill in the pm  11    sertraline (ZOLOFT) 50 mg tablet TAKE 1 & 1/2 TABLETS BY MOUTH ONCE DAILY  5    fluticasone (FLONASE) 50 mcg/actuation nasal spray USE 1 SPRAY IN EACH NOSTRIL ONCE DAILY  5    rizatriptan (MAXALT) 10 mg tablet TAKE 1 TABLET BY MOUTH STAT, MAY REPEAT IN 2 HOURS IF NEEDED. MAX: 2 TABS DAILY  5    acetaminophen (TYLENOL EXTRA STRENGTH) 500 mg tablet Take 1 Tab by mouth every six (6) hours as needed for Pain.  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  Unspecified adverse effect of anesthesia     with C- section the spinal did not take and had to be put to sleep    Palacios-Parkinson-White syndrome     Marie-Parkinson-White syndrome      Past Surgical History:   Procedure Laterality Date     DELIVERY ONLY  2007    HX  SECTION      c section x 2    HX CHOLECYSTECTOMY      HX HEENT  As Child    \"tubes in each ear\" as child    HX SVT ABLATION      ,     HX TONSILLECTOMY  2007    tonsils     Family History   Problem Relation Age of Onset   Lashawn Awe Cancer Mother 40     ovarian    Migraines Mother     Heart Disease Father     Migraines Father     Cancer Maternal Grandfather      lung    Psychiatric Disorder Maternal Grandmother     Parkinson's Disease Paternal Grandfather     Migraines Sister     Cancer Paternal Grandmother      breast cancer    Asthma Child     Eczema Child      Social History   Substance Use Topics    Smoking status: Never Smoker    Smokeless tobacco: Never Used    Alcohol use 0.5 - 1.0 oz/week     1 - 2 Glasses of wine per week      Comment: 1        Objective:     Visit Vitals    BP (!) 131/93    Pulse 67    Temp 98.3 °F (36.8 °C) (Oral)    Resp 16    Ht 5' 2\" (1.575 m)    Wt 177 lb 6.4 oz (80.5 kg)    LMP 2018 (Approximate)    SpO2 100%    BMI 32.45 kg/m2      Appearance: alert, well appearing, and in no distress.    Musculoskeletal exam: no joint tenderness, deformity or swelling, abnormal exam of left shoulder with pain on rotation, neck and upper back with muscle spasm. Imaging  is not indicated    Assessment/Plan:       ICD-10-CM ICD-9-CM    1. Acute pain of left shoulder M25.512 719.41 cyclobenzaprine (FLEXERIL) 10 mg tablet      ibuprofen (MOTRIN) 600 mg tablet     The patient is advised to rest, apply cold or ice intermittently, gradually reintroduce usual activities, avoid heavy lifting until better and return PRN if symptoms persist or worsen. Elevated blood pressure likely due to pain, instructed her to rest and follow-up with pcp.

## 2018-07-27 NOTE — PATIENT INSTRUCTIONS
Joint Pain: Care Instructions  Your Care Instructions    Many people have small aches and pains from overuse or injury to muscles and joints. Joint injuries often happen during sports or recreation, work tasks, or projects around the home. An overuse injury can happen when you put too much stress on a joint or when you do an activity that stresses the joint over and over, such as using the computer or rowing a boat. You can take action at home to help your muscles and joints get better. You should feel better in 1 to 2 weeks, but it can take 3 months or more to heal completely. Follow-up care is a key part of your treatment and safety. Be sure to make and go to all appointments, and call your doctor if you are having problems. It's also a good idea to know your test results and keep a list of the medicines you take. How can you care for yourself at home? · Do not put weight on the injured joint for at least a day or two. · For the first day or two after an injury, do not take hot showers or baths, and do not use hot packs. The heat could make swelling worse. · Put ice or a cold pack on the sore joint for 10 to 20 minutes at a time. Try to do this every 1 to 2 hours for the next 3 days (when you are awake) or until the swelling goes down. Put a thin cloth between the ice and your skin. · Wrap the injury in an elastic bandage. Do not wrap it too tightly because this can cause more swelling. · Prop up the sore joint on a pillow when you ice it or anytime you sit or lie down during the next 3 days. Try to keep it above the level of your heart. This will help reduce swelling. · Take an over-the-counter pain medicine, such as acetaminophen (Tylenol), ibuprofen (Advil, Motrin), or naproxen (Aleve). Read and follow all instructions on the label. · After 1 or 2 days of rest, begin moving the joint gently.  While the joint is still healing, you can begin to exercise using activities that do not strain or hurt the painful joint. When should you call for help? Call your doctor now or seek immediate medical care if:    · You have signs of infection, such as:  ¨ Increased pain, swelling, warmth, and redness. ¨ Red streaks leading from the joint. ¨ A fever.    Watch closely for changes in your health, and be sure to contact your doctor if:    · Your movement or symptoms are not getting better after 1 to 2 weeks of home treatment. Where can you learn more? Go to http://dorita-nara.info/. Enter P205 in the search box to learn more about \"Joint Pain: Care Instructions. \"  Current as of: November 29, 2017  Content Version: 11.7  © 0281-5033 [employer]. Care instructions adapted under license by [employer] (which disclaims liability or warranty for this information). If you have questions about a medical condition or this instruction, always ask your healthcare professional. [address] any warranty or liability for your use of this information.

## 2018-07-27 NOTE — MR AVS SNAPSHOT
[address] [facility] 
 
 
 [address] [facility], [place] [address] [facility] [place] 
[phone] Patient: Bakari Hayes MRN: [id number] :1980 Visit Information Date & Time Provider Department Dept. Phone Encounter #  
 2018  2:30 PM Sangita [place], [address] [facility] [phone] [id number] Follow-up Instructions Return if symptoms worsen or fail to improve. Your Appointments 2018  2:30 PM  
[place] [address] [facility] ([facility]) Appt Note: left shoulder pain [address] [facility], [place] [address] [place] [address]  
[phone]  
  
   
 [address] [facility], [place] [address] Upcoming Health Maintenance Date Due DTaP/Tdap/Td series (1 - Tdap) 10/4/2002 PAP AKA CERVICAL CYTOLOGY 10/4/2002 Influenza Age 5 to Adult 2018 Allergies as of 2018  Review Complete On: 2018 By: George Nieto LPN Severity Noted Reaction Type Reactions Lisinopril  2014    Swelling Morphine  04/15/2018    Other (comments) Chest pain Current Immunizations  Reviewed on 11/3/2015 Name Date Influenza Vaccine 10/3/2016, 10/26/2015 Influenza Vaccine Whole 2010 Not reviewed this visit You Were Diagnosed With   
  
 Codes Comments Acute pain of left shoulder    -  Primary ICD-10-CM: M25.512 ICD-9-CM: 719.41 Vitals BP Pulse Temp Resp Height(growth percentile) Weight(growth percentile) (!) 131/93 67 98.3 °F (36.8 °C) (Oral) 16 5' 2\" (1.575 m) 177 lb 6.4 oz (80.5 kg) LMP SpO2 BMI OB Status Smoking Status 2018 (Approximate) 100% 32.45 kg/m2 Having regular periods Never Smoker Vitals History BMI and BSA Data Body Mass Index Body Surface Area  
 32.45 kg/m 2 1.88 m 2 Preferred Pharmacy Pharmacy Name Phone [facility] [address] [phone] Your Updated Medication List  
  
   
This list is accurate as of 7/27/18  2:13 PM.  Always use your most recent med list.  
  
  
  
  
 acetaminophen 500 mg tablet Commonly known as:  Jr Vinay Osborne Se Take 1 Tab by mouth every six (6) hours as needed for Pain. cyclobenzaprine 10 mg tablet Commonly known as:  FLEXERIL Take 1 Tab by mouth three (3) times daily as needed for Muscle Spasm(s). flecainide 50 mg tablet Commonly known as:  TAMBOCOR  
1/2 pill in the am and 1/2 pill in the pm  
  
 fluticasone 50 mcg/actuation nasal spray Commonly known as:  FLONASE  
USE 1 SPRAY IN EACH NOSTRIL ONCE DAILY  
  
 ibuprofen 600 mg tablet Commonly known as:  MOTRIN Take 1 Tab by mouth every six (6) hours as needed for Pain.  
  
 rizatriptan 10 mg tablet Commonly known as:  [place] Ok TAKE 1 TABLET BY MOUTH STAT, MAY REPEAT IN 2 HOURS IF NEEDED. MAX: 2 TABS DAILY  
  
 sertraline 50 mg tablet Commonly known as:  ZOLOFT  
TAKE 1 & 1/2 TABLETS BY MOUTH ONCE DAILY  
  
 TRI-PREVIFEM (28) 0.18/0.215/0.25 mg-35 mcg (28) Tab Generic drug:  norgestimate-ethinyl estradiol Take  by mouth. Prescriptions Sent to Pharmacy Refills  
 cyclobenzaprine (FLEXERIL) 10 mg tablet 1 Sig: Take 1 Tab by mouth three (3) times daily as needed for Muscle Spasm(s). Class: Normal  
 Pharmacy: [facility] [place], [address] Ph #: [phone] Route: Oral  
 ibuprofen (MOTRIN) 600 mg tablet 1 Sig: Take 1 Tab by mouth every six (6) hours as needed for Pain. Class: Normal  
 Pharmacy: [place], [address] Ph #: [phone] Route: Oral  
  
Follow-up Instructions Return if symptoms worsen or fail to improve. Patient Instructions Joint Pain: Care Instructions Your Care Instructions Many people have small aches and pains from overuse or injury to muscles and joints. Joint injuries often happen during sports or recreation, work tasks, or projects around the home. An overuse injury can happen when you put too much stress on a joint or when you do an activity that stresses the joint over and over, such as using the computer or rowing a boat. You can take action at home to help your muscles and joints get better. You should feel better in 1 to 2 weeks, but it can take 3 months or more to heal completely. Follow-up care is a key part of your treatment and safety. Be sure to make and go to all appointments, and call your doctor if you are having problems. It's also a good idea to know your test results and keep a list of the medicines you take. How can you care for yourself at home? · Do not put weight on the injured joint for at least a day or two. · For the first day or two after an injury, do not take hot showers or baths, and do not use hot packs. The heat could make swelling worse. · Put ice or a cold pack on the sore joint for 10 to 20 minutes at a time. Try to do this every 1 to 2 hours for the next 3 days (when you are awake) or until the swelling goes down. Put a thin cloth between the ice and your skin. · Wrap the injury in an elastic bandage. Do not wrap it too tightly because this can cause more swelling. · Prop up the sore joint on a pillow when you ice it or anytime you sit or lie down during the next 3 days. Try to keep it above the level of your heart. This will help reduce swelling. · Take an over-the-counter pain medicine, such as acetaminophen (Tylenol), ibuprofen (Advil, Motrin), or naproxen (Aleve). Read and follow all instructions on the label. · After 1 or 2 days of rest, begin moving the joint gently. While the joint is still healing, you can begin to exercise using activities that do not strain or hurt the painful joint. When should you call for help? Call your doctor now or seek immediate medical care if:   · You have signs of infection, such as: 
¨ Increased pain, swelling, warmth, and redness. ¨ Red streaks leading from the joint. ¨ A fever.  
 Watch closely for changes in your health, and be sure to contact your doctor if: 
  · Your movement or symptoms are not getting better after 1 to 2 weeks of home treatment. Where can you learn more? Go to http://dorita-nara.info/. Enter P205 in the search box to learn more about \"Joint Pain: Care Instructions. \" Current as of: November 29, 2017 Content Version: 11.7 © 6726-1281 [employer]. Care instructions adapted under license by [employer] (which disclaims liability or warranty for this information). If you have questions about a medical condition or this instruction, always ask your healthcare professional. [address] any warranty or liability for your use of this information. Introducing [facility]! Dear Sydnee Mosher: Thank you for requesting a [employer] account. Our records indicate that you already have an active [employer] account. You can access your account anytime at [email or web address] Did you know that you can access your hospital and ER discharge instructions at any time in [employer]? You can also review all of your test results from your hospital stay or ER visit. Additional Information If you have questions, please visit the Frequently Asked Questions section of the [employer] website at [email or web address] Remember, [employer] is NOT to be used for urgent needs. For medical emergencies, dial 911. Now available from your iPhone and Android! Please provide this summary of care documentation to your next provider. Your primary care clinician is listed as Mireille Kapoor. If you have any questions after today's visit, please call [phone].

## 2018-11-08 ENCOUNTER — HOSPITAL ENCOUNTER (EMERGENCY)
Age: 38
Discharge: HOME | End: 2018-11-08
Attending: EMERGENCY MEDICINE
Payer: COMMERCIAL

## 2018-11-08 VITALS
HEIGHT: 63 IN | BODY MASS INDEX: 29.23 KG/M2 | RESPIRATION RATE: 16 BRPM | OXYGEN SATURATION: 99 % | DIASTOLIC BLOOD PRESSURE: 95 MMHG | TEMPERATURE: 97.9 F | WEIGHT: 165 LBS | HEART RATE: 95 BPM | SYSTOLIC BLOOD PRESSURE: 136 MMHG

## 2018-11-08 DIAGNOSIS — R51.9 NONINTRACTABLE HEADACHE, UNSPECIFIED CHRONICITY PATTERN, UNSPECIFIED HEADACHE TYPE: Primary | ICD-10-CM

## 2018-11-08 PROCEDURE — 96361 HYDRATE IV INFUSION ADD-ON: CPT

## 2018-11-08 PROCEDURE — 74011000250 HC RX REV CODE- 250: Performed by: EMERGENCY MEDICINE

## 2018-11-08 PROCEDURE — 99282 EMERGENCY DEPT VISIT SF MDM: CPT

## 2018-11-08 PROCEDURE — 96374 THER/PROPH/DIAG INJ IV PUSH: CPT

## 2018-11-08 PROCEDURE — 96375 TX/PRO/DX INJ NEW DRUG ADDON: CPT

## 2018-11-08 PROCEDURE — 74011250636 HC RX REV CODE- 250/636: Performed by: EMERGENCY MEDICINE

## 2018-11-08 RX ORDER — KETOROLAC TROMETHAMINE 30 MG/ML
30 INJECTION, SOLUTION INTRAMUSCULAR; INTRAVENOUS
Status: COMPLETED | OUTPATIENT
Start: 2018-11-08 | End: 2018-11-08

## 2018-11-08 RX ORDER — DIPHENHYDRAMINE HYDROCHLORIDE 50 MG/ML
12.5 INJECTION, SOLUTION INTRAMUSCULAR; INTRAVENOUS
Status: COMPLETED | OUTPATIENT
Start: 2018-11-08 | End: 2018-11-08

## 2018-11-08 RX ADMIN — DIPHENHYDRAMINE HYDROCHLORIDE 12.5 MG: 50 INJECTION, SOLUTION INTRAMUSCULAR; INTRAVENOUS at 14:43

## 2018-11-08 RX ADMIN — KETOROLAC TROMETHAMINE 30 MG: 30 INJECTION, SOLUTION INTRAMUSCULAR at 14:44

## 2018-11-08 RX ADMIN — METHYLPREDNISOLONE SODIUM SUCCINATE 125 MG: 125 INJECTION, POWDER, FOR SOLUTION INTRAMUSCULAR; INTRAVENOUS at 14:43

## 2018-11-08 RX ADMIN — SODIUM CHLORIDE 10 MG: 9 INJECTION INTRAMUSCULAR; INTRAVENOUS; SUBCUTANEOUS at 14:44

## 2018-11-08 RX ADMIN — SODIUM CHLORIDE 1000 ML: 900 INJECTION, SOLUTION INTRAVENOUS at 14:42

## 2018-11-08 NOTE — DISCHARGE INSTRUCTIONS

## 2018-11-08 NOTE — ED NOTES
SBAR report given to Shonna Colin RN. Patient moved to private room, lights dimmed per patient request. Patient given 2 warm blankets, denies additional needs.

## 2018-11-08 NOTE — ED PROVIDER NOTES
HPI Pt reports abrupt onset of a migraine headache today at work. She has been suffering from neck spasms for several weeks which she thinks brought on her headache. She took maxalt without relief. Denies fever, cold symptoms, neck pain, visual changes, focal weakness or rash. Old charts reviewed Past Medical History:  
Diagnosis Date  Arrhythmia  and  Ablation for SVT and WPW  Arrhythmia atrial   
 A-Fib  Arthritis  Asthma  Chronic pain   
 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  
 connective tissue disorder  GERD (gastroesophageal reflux disease)  Herpes simplex without mention of complication   
 outbreak at 17 but never since then  IBS (irritable bowel syndrome) since childhood  
 irritable bowel syndrome  Migraine  Migraine  Nausea & vomiting  Ovarian cyst 2009  Post partum depression Post Partum Depression  PUD (peptic ulcer disease)  Thromboembolus ([address]  Unspecified adverse effect of anesthesia *Felt \"awake\" during procedure/ *able to hear staff during procedure.  Unspecified adverse effect of anesthesia   
 with C- section the spinal did not take and had to be put to sleep  Palacios-Parkinson-White syndrome  Marie-Parkinson-White syndrome Past Surgical History:  
Procedure Laterality Date [facility] [address] ONLY  2007  HX  SECTION    
 c section x 2  
 HX CHOLECYSTECTOMY  HX HEENT  As Child \"tubes in each ear\" as child  HX SVT ABLATION    
 ,   HX TONSILLECTOMY  2007  
 tonsils Family History:  
Problem Relation Age of Onset  Cancer Mother 40  
     ovarian  Migraines Mother  Heart Disease Father  Migraines Father  Cancer Maternal Grandfather   
     lung  Psychiatric Disorder Maternal Grandmother  Parkinson's Disease Paternal Grandfather  Migraines Sister  Cancer Paternal Grandmother   
     breast cancer  Asthma Child  Eczema Child Social History Socioeconomic History  Marital status:  Spouse name: Not on file  Number of children: Not on file  Years of education: Not on file  Highest education level: Not on file Social Needs  Financial resource strain: Not on file  Food insecurity - worry: Not on file  Food insecurity - inability: Not on file  Transportation needs - medical: Not on file  Transportation needs - non-medical: Not on file Occupational History  Not on file Tobacco Use  Smoking status: Never Smoker  Smokeless tobacco: Never Used Substance and Sexual Activity  Alcohol use: Yes Alcohol/week: 0.5 - 1.0 oz Types: 1 - 2 Glasses of wine per week Comment: 1  Drug use: No  
 Sexual activity: Yes  
  Partners: Male Birth control/protection: Pill Other Topics Concern  Not on file Social History Narrative  Not on file ALLERGIES: Lisinopril and Morphine Review of Systems Constitutional: Positive for activity change. Negative for fever. HENT: Negative for congestion. Respiratory: Negative for chest tightness. Cardiovascular: Negative for chest pain. Gastrointestinal: Positive for nausea and vomiting. Negative for abdominal pain. Neurological: Positive for headaches. Negative for dizziness, weakness and numbness. All other systems reviewed and are negative. Vitals:  
 11/08/18 1344 11/08/18 1403 BP:  (!) 136/95 Pulse: 95 95 Resp:  16 Temp:  97.9 °F (36.6 °C) SpO2: 99% 99% Weight:  74.8 kg (165 lb) Height:  5' 3\" (1.6 m) Physical Exam  
Constitutional: She is oriented to person, place, and time. White female; non smoker;  HENT:  
Head: Normocephalic.   
Right Ear: External ear normal.  
Left Ear: External ear normal.  
Nose: Nose normal.  
Mouth/Throat: Oropharynx is clear and moist.  
 Eyes: Conjunctivae and EOM are normal. Pupils are equal, round, and reactive to light. Neck: Normal range of motion. Neck supple. Cardiovascular: Normal rate and regular rhythm. Pulmonary/Chest: Effort normal and breath sounds normal.  
Abdominal: Soft. Bowel sounds are normal.  
Musculoskeletal: Normal range of motion. She exhibits no edema, tenderness or deformity. Lymphadenopathy:  
  She has no cervical adenopathy. Neurological: She is alert and oriented to person, place, and time. Skin: Skin is warm and dry. Psychiatric: She has a normal mood and affect. Nursing note and vitals reviewed. MDM Procedures Pt has been re-examined and is presently pain free. 3:42 PM 
Patient's results and plan of care  have been reviewed with her. Patient and/or family have verbally conveyed their understanding and agreement of the patient's signs, symptoms, diagnosis, treatment and prognosis and additionally agree to follow up as recommended or return to the Emergency Room should her condition change prior to follow-up. Discharge instructions have also been provided to the patient with some educational information regarding her diagnosis as well a list of reasons why she would want to return to the ER prior to her follow-up appointment should her condition change. Sean Duval NP

## 2018-11-09 ENCOUNTER — PATIENT OUTREACH (OUTPATIENT)
Dept: OTHER | Age: 38
End: 2018-11-09

## 2018-11-09 NOTE — PROGRESS NOTES
HPRR progress note Patient eligible for [facility] care management Received notification of discharge from [facility] on 11/8/18 for migraine. Contacted patient to discuss post discharge needs and offer care management services. Two patient identifiers verified. Discussed the care management program.  Patient agrees to care management services at this time. PMH:  
Past Medical History:  
Diagnosis Date  Arrhythmia 2008 and 2011 Ablation for SVT and WPW  Arrhythmia atrial   
 A-Fib  Arthritis  Asthma  Chronic pain   
 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  
 connective tissue disorder  GERD (gastroesophageal reflux disease)  Herpes simplex without mention of complication   
 outbreak at 17 but never since then  IBS (irritable bowel syndrome) since childhood  
 irritable bowel syndrome  Migraine  Migraine  Nausea & vomiting  Ovarian cyst 2009  Post partum depression Post Partum Depression  PUD (peptic ulcer disease)  Thromboembolus ([address]  Unspecified adverse effect of anesthesia *Felt \"awake\" during procedure/ *able to hear staff during procedure.  Unspecified adverse effect of anesthesia   
 with C- section the spinal did not take and had to be put to sleep  Palacios-Parkinson-White syndrome  Marie-Parkinson-White syndrome Social History:  
Social History Socioeconomic History  Marital status:  Spouse name: Not on file  Number of children: Not on file  Years of education: Not on file  Highest education level: Not on file Social Needs  Financial resource strain: Not on file  Food insecurity - worry: Not on file  Food insecurity - inability: Not on file  Transportation needs - medical: Not on file  Transportation needs - non-medical: Not on file Occupational History  Not on file Tobacco Use  
  Smoking status: Never Smoker  Smokeless tobacco: Never Used Substance and Sexual Activity  Alcohol use: Yes Alcohol/week: 0.5 - 1.0 oz Types: 1 - 2 Glasses of wine per week Comment: 1  Drug use: No  
 Sexual activity: Yes  
  Partners: Male Birth control/protection: Pill Other Topics Concern  Not on file Social History Narrative  Not on file Care management assessment completed: 
 
Neurological Condition Focused Assessment Description of pain: no pain, currently. Associated symptoms: she had nausea and vomiting. Have you recently had any of the following? Any recent changes in mood, thinking or new symptoms no Any change in speech no Difficulty swallowing no Dizziness/lightheadedness yes Fatigue yes, post migraine Anxiety yes, with the migraine Confusion no Sleep disturbance no Visual changes light sensitivity, dark line in periphery with the migraine Limb paralysis, or facial drooping no Weakness yes, with the migraine Trouble with coordinating your movement, or change in gait no Medication changes? no 
Any recent changes in activity no Red Flags: 
Call 911 anytime you think you may need emergency care. For example, call if: 
  · You have symptoms of a stroke. These may include: 
? Sudden numbness, tingling, weakness, or loss of movement in your face, arm, or leg, especially on only one side of your body. ? Sudden vision changes. ? Sudden trouble speaking. ? Sudden confusion or trouble understanding simple statements. ? Sudden problems with walking or balance. ? A sudden, severe headache that is different from past headaches.  
 Call your doctor now or seek immediate medical care if: 
  · You have new or worse nausea and vomiting.  
  · You have a new or higher fever.  
  · Your headache gets much worse Medications: 
New Medications at Discharge: no 
Changed Medications at Discharge: no 
 Discontinued Medications at Discharge: no 
Current Outpatient Medications Medication Sig  cyclobenzaprine (FLEXERIL) 10 mg tablet Take 1 Tab by mouth three (3) times daily as needed for Muscle Spasm(s).  ibuprofen (MOTRIN) 600 mg tablet Take 1 Tab by mouth every six (6) hours as needed for Pain.  norgestimate-ethinyl estradiol (TRI-PREVIFEM, 28,) 0.18/0.215/0.25 mg-35 mcg (28) tab Take  by mouth.  flecainide (TAMBOCOR) 50 mg tablet 1/2 pill in the am and 1/2 pill in the pm  
 sertraline (ZOLOFT) 50 mg tablet TAKE 1 & 1/2 TABLETS BY MOUTH ONCE DAILY  fluticasone (FLONASE) 50 mcg/actuation nasal spray USE 1 SPRAY IN EACH NOSTRIL ONCE DAILY  rizatriptan (MAXALT) 10 mg tablet TAKE 1 TABLET BY MOUTH STAT, MAY REPEAT IN 2 HOURS IF NEEDED. MAX: 2 TABS DAILY  acetaminophen (TYLENOL EXTRA STRENGTH) 500 mg tablet Take 1 Tab by mouth every six (6) hours as needed for Pain. No current facility-administered medications for this visit. There are no discontinued medications. Performed medication reconciliation with patient, and patient verbalizes understanding of administration of home medications. There were no barriers to obtaining medications identified at this time. Preventative Care Health Maintenance Topic Date Due  
 DTaP/Tdap/Td series (1 - Tdap) 10/04/2002  PAP AKA CERVICAL CYTOLOGY  10/04/2002  Influenza Age 5 to Adult  08/01/2018 Healthy Habits Nutrition: Just started keto diet in August.  She states that she has a lot more energy and her cholesterol levels are now normal. 
 
Movement: Walking on lunch breaks. CM Identified  Problems 1. No follow up appointment made yet. Encouraged patient to make her appointment. Barriers/Support system: 
Patient,  Home health/DME in place per discharge orders Barriers/Challenges to Care: []  Decline in memory    []  Language barrier    
[]  Emotional                  []  Limited mobility []  Lack of motivation     [] Vision, hearing or cognitive impairment []  Knowledge [] Financial Barriers []  Lack of support  []  Pain []  Other [x]  None PCP/Specialist follow up: Patient scheduled to follow up with Macie Perdomo MD.  Patient states she will make the follow up appointment with Dr. Sintia Rubalcava. Patient reports she does not have predictability with her migraines. She goes months without having them and then may get a few in a week. She has seen a neurologist a few years ago, but not recently. Future Appointments Date Time Provider Najma Tillman 11/20/2018  3:30 PM Neville Maldonado., PT [facility] RE  
 
 Reviewed red flags with patient, and patient verbalizes understanding. Patient given an opportunity to ask questions. No other clinical/social/functional needs noted. The patient agrees to contact the PCP office for questions related to their healthcare. The patient expressed thanks, offered no additional questions and ended the call. Plan for next call: about a week.

## 2018-11-16 ENCOUNTER — PATIENT OUTREACH (OUTPATIENT)
Dept: OTHER | Age: 38
End: 2018-11-16

## 2018-11-16 NOTE — PROGRESS NOTES
Follow up phone call to patient, two pt identifiers verified. Discussed patient's goals:  
Goals  Establish PCP relationships and regularly scheduled appointments. Patient is aware that she needs to make follow up appointment with Dr. Sabi Jaramillo.  Knowledge and adherence to medication plan. Discussed importance of taking medications as directed. Condition Focused Assessment Neurological Condition Focused Assessment Medication changes? No, patient is using salon pas patches for her pain and having success with them. She is also taking ibuprofen. PT/OT/ST ordered yes, First PT appointment is on 11/20/18. Readiness to Change: []  Pre-contemplative    []  Contemplative 
[]  Preparation               [x]  Action                  []  Maintenance Barriers/Challenges to Care: []  Decline in memory    []  Language barrier    
[]  Emotional                  []  Limited mobility 
[]  Lack of motivation     [] Vision, hearing or cognitive impairment []  Knowledge [] Financial Barriers []  Lack of support  [x]  Pain []  Other []  None Upcoming appointments:  
Future Appointments Date Time Provider Najma Tillman 11/20/2018  3:30 PM Domitila Hein, PT [facility]  
 
Plan for next call: December 5, 2018

## 2018-11-20 ENCOUNTER — HOSPITAL ENCOUNTER (OUTPATIENT)
Dept: PHYSICAL THERAPY | Age: 38
Discharge: HOME | End: 2018-11-20
Payer: COMMERCIAL

## 2018-11-20 PROCEDURE — 97110 THERAPEUTIC EXERCISES: CPT | Performed by: PHYSICAL THERAPIST

## 2018-11-20 PROCEDURE — 97161 PT EVAL LOW COMPLEX 20 MIN: CPT | Performed by: PHYSICAL THERAPIST

## 2018-11-20 NOTE — PROGRESS NOTES
PT INITIAL EVALUATION NOTE - [facility] 2-15 Patient Name: Myranda Camacho Date:2018 : 1980 [x]  Patient  Verified Payor: Lizeth Olmstead / Plan: Lexii Overall / Product Type: PPO / In time:330  Out time:430 P Total Treatment Time (min): 60 Total Timed Codes (min): 60 (40 eval, 20 timed see below) 1:1 Treatment Time ( [address] only): 60 Visit #: 1 Treatment Area: Neck pain [M54.2] SUBJECTIVE Any medication changes, allergies to medications, adverse drug reactions, diagnosis change, or new procedure performed?: [] No    [x] Yes (see summary sheet for update) Pt presents with neck pain, HA, jaw pain, and ear discomfort that started about 1 year ago No DESMOND, started off rather intermittent, now is constant Pt reports she has suffered migraines for many years, however the neck pain and head pain are new and are what bring pt in today Location of pain: base of skull (R>L), radiates up posterior aspect of head to the eye and jaw Description of pain: starts achy, then turns throbbing. Jaw pain and sensation of ear being \"clogged\" Pt admits of recent blurred vision, double vision, and nausea when HA are very bad. This is only recent within the last two weeks Pt has not seen Neuro yet Pain:   10/10 max 2/10 min 4/10 now Aggravated by: being upright, any sort of activity Eased by: supine, meds, heat Headaches:     [x] Yes   [] No 
Dizziness:     [] Yes    [x] No 
Jaw Pain:    [x] Yes    [] No (R) only UE tingling/numbness:   [] Yes   [x] No 
UE weakness:    [] Yes    [x] No 
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 Social: Has two kids, older, she does not have to lift them.  at home as well. 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 
Increased lower cervical flexion, Increased upper cervical ext [x] Kyphosis  [x] Inc    [] Dec 
Cervical Lordosis  [] Inc    [x] Dec 
 
AROM cervical spine (Degrees) Flexion 50 Extension 70 R Sidebending 40 L Sidebending 30 R Rotation 70 L Rotation 60 *all directions with pain Myotomal Testing 5/5 cervical myotomes MMT:4-/5 rhomboids, middle trap, serratus anterior, external rotators. Tenderness to palpation: (R) SCM, oblique capitis inferior, UT, levator Special Tests:   
     Vertebral Artery:   [] R    [] L    [] +    [x] - Alar Ligament:  [] R    [] L    [] +    [x] - Transverse Ligament: [] R    [] L    [] +    [x] - Spurling's:   [] R    [] L    [] +    [x] - Distraction:   [] R    [] L    [] +    [x] - Compression:  [] R    [] L    [] +    [x] - 
 
Joint mobility:  NT cervical spine secondary to pt's hypermobility Slight hypomobility noted throughout upper thoracic spine Outcome Measure: Using standardized self-reported disability survey (Focus on Therapeutic Outcomes) the patient's perceived disability score is 47 - zero is the most disabled and 100 is the least disabled. OBJECTIVE [x] Skin assessment post-treatment:  [x]intact []redness- no adverse reaction 
  []redness  adverse reaction:  
 
20 min Therapeutic Exercise:  [x] See flow sheet :  
Rationale: increase ROM and increase strength to improve the patients ability to perform ADL's With 
 [x] TE 
 [] TA 
 [] neuro 
 [] manual: Patient Education: [x] Review HEP [] Progressed/Changed HEP based on:  
[] positioning   [] body mechanics   [] transfers   [x] Ice application- pt advised to ice 10-15 min 1-2 x/day to area in order to dec inflammation [x] other:  re: mechanism of injury/condition, role of physical therapy, prognosis for recovery, heat vs ice, activity modifications. Advised pt schedule neuro consult secondary to double vision and blurred vision. Discussion re: proper sitting posture using lumbar roll. Demonstrated using roll in clinic. Advised pt to utilize lumbar roll during sitting at work and also while driving. Discussion re: driving ergonomics. Pt advised to adjust height of seat in car so hips are level or slightly above knees to decrease irritation at hip/low back. Pt also advised to adjust car lumbar support to maximum support option. Discussion re: neutral cervical posture. Pt to avoid excessive cervical flexion with upper cervical extension in order to take pressure of cervical musculature. Pain Level (0-10 scale) post treatment: 4 
 
ASSESSMENT/Changes in Function:  
 
[x]  See Plan of Care Kyler Ornelas.  Amee PT, DPT, CMTPT 
PT License Number: [id number]   11/20/2018  3:30 PM

## 2018-11-20 NOTE — PROGRESS NOTES
[facility], [address] [place] [address] Phone: [phone]  Fax: [phone] Plan of Care/Statement of Necessity for Physical Therapy Services  2-15 Patient name: Patricio Garcia  : 1980  Provider#: [id number] Referral source: Titi Rita.,* Medical/Treatment Diagnosis: Neck pain [M54.2] Prior Hospitalization: see medical history Comorbidities: see evaluation Prior Level of Function:see evaluation Medications: Verified on Patient Summary List 
Start of Care: 2018     Onset Date:see evaluation The Plan of Care and following information is based on the information from the initial evaluation. Assessment/ key information: Patient presents with myofascial pain + hypermobility causing cervical pain, HA, jaw pain. Will benefit from physical therapy to address problems noted below in problem list. 
 
Evaluation Complexity History LOW Complexity : Zero comorbidities / personal factors that will impact the outcome / POC; Examination LOW Complexity : 1-2 Standardized tests and measures addressing body structure, function, activity limitation and / or participation in recreation  ;Presentation LOW Complexity : Stable, uncomplicated  ;Clinical Decision Making MEDIUM Complexity : FOTO score of 26-74 Overall Complexity Rating: LOW Problem List: pain affecting function, decrease strength, decrease activity tolerance, decrease flexibility/ joint mobility and other hypermobility Treatment Plan may include any combination of the following: Therapeutic exercise, Therapeutic activities, Neuromuscular re-education, Physical agent/modality, Manual therapy, Patient education and Self Care training Patient / Family readiness to learn indicated by: asking questions, trying to perform skills and interest 
Persons(s) to be included in education: patient (P) Barriers to Learning/Limitations: None Patient Goal (s): please see evaluation in [facility] Patient Self Reported Health Status: please see paper chart Rehabilitation Potential: excellent Short Term Goals: To be accomplished in 5 treatments: 
-Independent in HEP as evidenced on ability to perform at least 5 exercises from HEP using proper form without verbal cuing.  
-Pain less than or equal to 7/10 at worst to allow patient to perform ADL's with greater ease 
-Demostrate proper posture in order to decrease cervical pain 
-Pt will report compliance with icing 1-2x/day in order to decrease inflammation 
-Intensity of headaches and neck pain dec by 25% to allow pt to perform work duties 
-Frequency of headaches and neck pain dec by 25% to allow pt to perform household duties Long Term Goals: To be accomplished in 10 treatments: 
-Pt will report pain with sleeping decreased by 75% to allow her to get better rest 
-Eliminate headaches/neck pain to allow pt to take care of house without pain 
-FOTO score greater than or equal to 62 to allow patient to perform a greater amount of ADLs. Frequency / Duration: Patient to be seen 1- 2 times per week for 8-10 weeks. Patient/ Caregiver education and instruction: self care, activity modification and exercises 
 
[x]  Plan of care has been reviewed with PTA Certification Period: 11/20/2018 -  2/20/18 Chrissy Lubin, PT, DPT, CMTPT      01/48/0383 8:86 PM 
PT License Number: [id number] 
_____________________________________________________________________ I certify that the above Therapy Services are being furnished while the patient is under my care. I agree with the treatment plan and certify that this therapy is necessary. [de-identified] Signature:____________________  Date:____________Time:_________

## 2018-11-27 ENCOUNTER — HOSPITAL ENCOUNTER (OUTPATIENT)
Dept: PHYSICAL THERAPY | Age: 38
Discharge: HOME | End: 2018-11-27
Payer: COMMERCIAL

## 2018-11-27 PROCEDURE — 97140 MANUAL THERAPY 1/> REGIONS: CPT | Performed by: PHYSICAL THERAPIST

## 2018-11-27 PROCEDURE — 97110 THERAPEUTIC EXERCISES: CPT | Performed by: PHYSICAL THERAPIST

## 2018-11-27 NOTE — PROGRESS NOTES
PT DAILY TREATMENT NOTE - [facility] 2-15 Patient Name: Morgan Sands Date:2018 : 1980 [x]  Patient  Verified Payor: Agatha Mccarty / Plan: Arch Spotted / Product Type: PPO / In time:100 P  Out time:205 P Total Treatment Time (min): 65 Total Timed Codes (min): 55 Visit #: 2 Treatment Area: Neck pain [M54.2] SUBJECTIVE Pain Level (0-10 scale): 2 Any medication changes, allergies to medications, adverse drug reactions, diagnosis change, or new procedure performed?: [x] No    [] Yes (see summary sheet for update) Subjective functional status/changes: \"I noticed a lot of improvement since last time. \" OBJECTIVE Modality rationale: decrease edema, decrease inflammation, decrease pain and reduce soreness post therapy in order to improve the patients ability to perform ADL's. Type Additional Details [x]  Ice     []  Heat Position:  supine, shyla LE's supported Location: cervical spine  
 
[x] Skin assessment post-treatment:  [x]intact []redness- no adverse reaction 
  []redness  adverse reaction:  
 
35 min Therapeutic Exercise:  [x] See flow sheet :  
Rationale: increase ROM, increase strength and improve functional mobility to improve the patients ability to perform ADL's 
 
20 min Manual Therapy: CPA and UPA T1-T9 grade III-IV. Thoracic screw grade V T1-T9. STM/MFR (R) SCM, UT, levator, cervical multifidi, temporalis Rationale: decrease pain, increase ROM, increase tissue extensibility, decrease trigger points and improve joint mobility to improve the patients ability to perform ADL's With 
 [x] TE 
 [] TA 
 [] neuro 
 [] manual: Patient Education: [x] Review HEP [] Progressed/Changed HEP based on:  
[] positioning   [] body mechanics   [] transfers   [] heat/ice application   
[x] other: provided pillow reccomendation Other Objective/Functional Measures:  
 
Pain Level (0-10 scale) post treatment: 0 
 
ASSESSMENT Patient will continue to benefit from skilled PT services to modify and progress therapeutic interventions, address functional mobility deficits, address ROM deficits, address strength deficits and analyze and address soft tissue restrictions to attain remaining goals. Progress towards goals / Updated goals: 
Pt tolerated ex's without inc pain PLAN 
[]  Upgrade activities as tolerated     [x]  Continue plan of care 
[]  Update interventions per flow sheet      
[]  Discharge due to:_ 
[]  Other:_ Bella Leigh. NIMESH Lubin, DPT, CMTPT    11/27/2018 PT License Number: [id number]

## 2018-12-04 ENCOUNTER — HOSPITAL ENCOUNTER (OUTPATIENT)
Dept: PHYSICAL THERAPY | Age: 38
Discharge: HOME | End: 2018-12-04
Payer: COMMERCIAL

## 2018-12-04 PROCEDURE — 97110 THERAPEUTIC EXERCISES: CPT | Performed by: PHYSICAL THERAPIST

## 2018-12-04 PROCEDURE — 97140 MANUAL THERAPY 1/> REGIONS: CPT | Performed by: PHYSICAL THERAPIST

## 2018-12-04 NOTE — PROGRESS NOTES
PT DAILY TREATMENT NOTE - [facility] 2-15 Patient Name: Taylor Almanzar Date:2018 : 1980 [x]  Patient  Verified Payor: Lexii Bower / Plan: [employer]MixRankbhanu Locker / Product Type: PPO / In time:330 P Out time:430 P Total Treatment Time (min): 60 Total Timed Codes (min): 50 Visit #: 3 Treatment Area: Neck pain [M54.2] SUBJECTIVE Pain Level (0-10 scale): 4 Any medication changes, allergies to medications, adverse drug reactions, diagnosis change, or new procedure performed?: [x] No    [] Yes (see summary sheet for update) Subjective functional status/changes:    
 
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 I'm going to go to get the pillow on my day off, they are holding it for me. I made an appt with Neurologist for [place], her first avail. \" OBJECTIVE Modality rationale: decrease edema, decrease inflammation, decrease pain and reduce soreness post therapy in order to improve the patients ability to perform ADL's. Type Additional Details [x]  Ice     []  Heat Position:  supine, shyla LE's supported Location: cervical spine  
 
[x] Skin assessment post-treatment:  [x]intact []redness- no adverse reaction 
  []redness  adverse reaction:  
 
30 min Therapeutic Exercise:  [x] See flow sheet :  
Rationale: increase ROM, increase strength and improve functional mobility to improve the patients ability to perform ADL's 
 
20 min Manual Therapy: CPA and UPA T1-T9 grade III-IV. Thoracic screw grade V T1-T9. STM/MFR (R) SCM, UT, levator, cervical multifidi, temporalis Rationale: decrease pain, increase ROM, increase tissue extensibility, decrease trigger points and improve joint mobility to improve the patients ability to perform ADL's With 
 [x] TE 
 [] TA 
 [] neuro 
 [] manual: Patient Education: [x] Review HEP [] Progressed/Changed HEP based on:  
[] positioning   [] body mechanics   [] transfers   [] heat/ice application [] other:   
 
Other Objective/Functional Measures:  
 
Pain Level (0-10 scale) post treatment: 0 
 
ASSESSMENT Patient will continue to benefit from skilled PT services to modify and progress therapeutic interventions, address functional mobility deficits, address ROM deficits, address strength deficits and analyze and address soft tissue restrictions to attain remaining goals. Progress towards goals / Updated goals: PLAN 
[]  Upgrade activities as tolerated     [x]  Continue plan of care 
[]  Update interventions per flow sheet      
[]  Discharge due to:_ 
[]  Other:_ Cory Harding. Amee PT, DPT, CMTPT    12/4/2018 PT License Number: [id number]

## 2018-12-05 ENCOUNTER — PATIENT OUTREACH (OUTPATIENT)
Dept: OTHER | Age: 38
End: 2018-12-05

## 2018-12-05 NOTE — PROGRESS NOTES
Attempt to reach patient for follow up. Discreet VM left with contact information. Will await call back and/or follow up in two weeks.

## 2018-12-11 ENCOUNTER — HOSPITAL ENCOUNTER (OUTPATIENT)
Dept: PHYSICAL THERAPY | Age: 38
Discharge: HOME | End: 2018-12-11
Payer: COMMERCIAL

## 2018-12-11 PROCEDURE — 97140 MANUAL THERAPY 1/> REGIONS: CPT | Performed by: PHYSICAL THERAPIST

## 2018-12-11 PROCEDURE — 97110 THERAPEUTIC EXERCISES: CPT | Performed by: PHYSICAL THERAPIST

## 2018-12-11 NOTE — PROGRESS NOTES
PT DAILY TREATMENT NOTE - [facility] 2-15    Patient Name: Julia Castro  Date:2018  : 1980  [x]  Patient  Verified  Payor: Kemi Sheth / Plan: Lela Flower / Product Type: PPO /    In time: 355 P Out time:500 P   Total Treatment Time (min): 65  Total Timed Codes (min): 55  Visit #: 4    Treatment Area: Neck pain [M54.2]    SUBJECTIVE  Pain Level (0-10 scale): 2  Any medication changes, allergies to medications, adverse drug reactions, diagnosis change, or new procedure performed?: [x] No    [] Yes (see summary sheet for update)  Subjective functional status/changes:       Patient reports decreased frequency of HA's since beginning therapy, down to about 1x per week. OBJECTIVE    Modality rationale: decrease edema, decrease inflammation, decrease pain and reduce soreness post therapy in order to improve the patients ability to perform ADL's. Type Additional Details   [x]  Ice     []  Heat   Position:  supine, shyla LE's supported    Location: cervical spine     [x] Skin assessment post-treatment:  [x]intact []redness- no adverse reaction    []redness  adverse reaction:     30 min Therapeutic Exercise:  [x] See flow sheet :   Rationale: increase ROM, increase strength and improve functional mobility to improve the patients ability to perform ADL's    25 min Manual Therapy: CPA and UPA T1-T9 grade III-IV. Thoracic screw grade V T1-T9.   STM/MFR (R) UT, levator, gentle suboccipital traction   Rationale: decrease pain, increase ROM, increase tissue extensibility, decrease trigger points and improve joint mobility to improve the patients ability to perform ADL's    With   [x] TE   [] TA   [] neuro   [] manual: Patient Education: [x] Review HEP    [] Progressed/Changed HEP based on:   [] positioning   [] body mechanics   [] transfers   [] heat/ice application    [] other:      Other Objective/Functional Measures:     Pain Level (0-10 scale) post treatment: 0    ASSESSMENT  Progressing with decreased frequency of headache. Patient will continue to benefit from skilled PT services to modify and progress therapeutic interventions, address functional mobility deficits, address ROM deficits, address strength deficits and analyze and address soft tissue restrictions to attain remaining goals.          Progress towards goals / Updated goals:    PLAN  []  Upgrade activities as tolerated     [x]  Continue plan of care  []  Update interventions per flow sheet       []  Discharge due to:_  []  Other:_      Jules Cross, PT, DPT, CMTPT    12/11/2018

## 2018-12-18 ENCOUNTER — HOSPITAL ENCOUNTER (OUTPATIENT)
Dept: PHYSICAL THERAPY | Age: 38
Discharge: HOME | End: 2018-12-18
Payer: COMMERCIAL

## 2018-12-18 PROCEDURE — 97140 MANUAL THERAPY 1/> REGIONS: CPT | Performed by: PHYSICAL THERAPIST

## 2018-12-18 NOTE — PROGRESS NOTES
PHYSICAL THERAPY -DRY NEEDLING  DAILY TREATMENT NOTE    Patient Name: Tony Lawler        Date: 2018  : 1980   YES Patient  Verified  Visit #:   3   of     Insurance: Payor: Claude Nims / Plan: Kenton Parcel / Product Type: PPO /      In time: 305 P Out time: 350 P   Total Treatment Time:  45   Total Timed Codes:  35   *Note: for Medicare patients, dry needling NOT included in  time. Only included in Total treatment time. TREATMENT AREA: Neck pain [M54.2]    SUBJECTIVE  Pain Level (on 0 to 10 scale):  2-3  / 10   Medication Changes/New allergies or changes in medical history, any new surgeries or procedures? NO    If yes, update Summary List   Subjective Functional Status/Changes:  []  No changes reported   Pt reporting increased pain today, however, since onset of PT, she has much less HA. Has hardly used any of her HA medicine. Decreased intensity and frequency of neck pain/HA. OBJECTIVE  Modalities: Ice used post-tx to decrease irritation, pain, and prevent soreness-10 min to cervical spine and occipitalis area    *Skin assessment post-treatment:    [x]  intact    [x]  redness- no adverse reaction     []redness  adverse reaction:        0 min Therapeutic Exercise:  [x]  See flow sheet:    Rationale:      increase ROM, increase strength and improve functional mobility to improve the patients ability to perform ADL'     35 min Manual Therapy: DN as noted in separate note below   Rationale:      decrease pain, increase ROM, increase tissue extensibility and decrease trigger points to improve patient's ability to perform ADL's. With MT  Patient Education:  YES  Reviewed HEP     Other Objective Measures:TTP (R)  occipitalis (R), SCM, UT, levator                Dry Needling Procedure Note    Dry Needle Session Number:  1      Procedure:    An intramuscular manual therapy (dry needling) and a neuro-muscular re-education treatment was done to deactivate myofascial trigger points, with a solid filament needle, under aseptic technique. Indication(s): [] Muscle spasms [] Headaches  [] TMJD      [] Muscle imbalances [x] Myofascial pain & dysfunction     [] Decreased ROM    TIMEOUT PERFORMED:    305 P (enter time the timeout was completed)   Ayla Neri (enter who was present)    Informed Consent Obtained: [x] Verbal  [x] Written   1. Script obtained from MD specifying \"dry needling. \"  2. Patient educated on risks and benefits of DN and signed consent. Discussed mechanism of DN, effects of DN, trigger points, the concept of referred pain, and what to expect post- treatment session. The following items were reviewed with the patient:  -Purpose of dry needling, side effects, possible complications, and the informed consent   -The need to report the use of blood thinners and/or immunosuppressant medications  -How to respond to possible adverse effects of the treatment  -Self treatment of post needling soreness: ice/heat, stretching, and activity modification.   -Opportunity was given to ask any questions, all questions were answered    Treatment:  The following muscles were treated today:  Trevin:  Right: .30 x 30 mm needles used to SCM, UT, levator. .20 x 30 mm needles used to occipitalis  Left:  *hemostasis applied after each needle was applied.    Soft tissue mobilization to above areas     Patients response:   [x]  LTRs  []  Muscle Relaxation  [x]  Pain Relief   []  Decreased HAs [x]  Post-needling soreness []  Increased ROM      Post Treatment Pain Level (on 0 to 10) scale:   sore  / 10   ASSESSMENT       [x]  See Progress Note/Recertification   Patient will continue to benefit from skilled PT services to modify and progress therapeutic interventions, address functional mobility deficits, address ROM deficits, address strength deficits, analyze and address soft tissue restrictions, analyze and modify body mechanics/ergonomics and instruct in home and community integration to attain remaining goals. Progress toward goals / Updated goals:       PLAN  []  Upgrade activities as tolerated YES Continue plan of care   []  Discharge due to :    [x]  Other: Assess initial DN session     Peggie Alpers.  Amee PT, DPT, CMTPT  PT License Number: [id number]    12/18/2018

## 2018-12-19 ENCOUNTER — PATIENT OUTREACH (OUTPATIENT)
Dept: OTHER | Age: 38
End: 2018-12-19

## 2018-12-27 ENCOUNTER — APPOINTMENT (OUTPATIENT)
Dept: PHYSICAL THERAPY | Age: 38
End: 2018-12-27
Payer: COMMERCIAL

## 2019-01-03 ENCOUNTER — HOSPITAL ENCOUNTER (OUTPATIENT)
Dept: PHYSICAL THERAPY | Age: 39
Discharge: HOME | End: 2019-01-03
Payer: COMMERCIAL

## 2019-01-03 PROCEDURE — 97110 THERAPEUTIC EXERCISES: CPT | Performed by: PHYSICAL THERAPIST

## 2019-01-03 PROCEDURE — 97140 MANUAL THERAPY 1/> REGIONS: CPT | Performed by: PHYSICAL THERAPIST

## 2019-01-03 NOTE — PROGRESS NOTES
PT DAILY TREATMENT NOTE - [facility] 2-15 Patient Name: Chelsea Ibarra Date:1/3/2019 : 1980 [x]  Patient  Verified Payor: Tiana Burks / Plan: Yojana Sotelo / Product Type: PPO / In time: 330 P Out time:425P Total Treatment Time (min): 45 Total Timed Codes (min): 55 Visit #: 6 Treatment Area: Neck pain [M54.2] SUBJECTIVE Pain Level (0-10 scale): 0 Any medication changes, allergies to medications, adverse drug reactions, diagnosis change, or new procedure performed?: [x] No    [] Yes (see summary sheet for update) Subjective functional status/changes: \"Today is a really good day. \" She states that dry needling reproduced her HA's and symptoms have been improved since. OBJECTIVE Modality rationale: decrease edema, decrease inflammation, decrease pain and reduce soreness post therapy in order to improve the patients ability to perform ADL's. Type Additional Details [x]  Ice     []  Heat Position:  supine, shyla LE's supported Location: cervical spine  
 
[x] Skin assessment post-treatment:  [x]intact []redness- no adverse reaction 
  []redness  adverse reaction:  
 
30 min Therapeutic Exercise:  [x] See flow sheet :  
Rationale: increase ROM, increase strength and improve functional mobility to improve the patients ability to perform ADL's 
 
15 min Manual Therapy: CPA and UPA T1-T9 grade III-IV. Thoracic screw grade V T1-T9. STM/MFR (R) UT, levator, gentle suboccipital traction Rationale: decrease pain, increase ROM, increase tissue extensibility, decrease trigger points and improve joint mobility to improve the patients ability to perform ADL's With 
 [x] TE 
 [] TA 
 [] neuro 
 [] manual: Patient Education: [x] Review HEP [] Progressed/Changed HEP based on:  
[] positioning   [] body mechanics   [] transfers   [] heat/ice application   
[] other:   
 
Other Objective/Functional Measures:  
 
Pain Level (0-10 scale) post treatment: 0 
 
 ASSESSMENT Progressing with decreased pain. Patient will continue to benefit from skilled PT services to modify and progress therapeutic interventions, address functional mobility deficits, address ROM deficits, address strength deficits and analyze and address soft tissue restrictions to attain remaining goals. Progress towards goals / Updated goals: PLAN 
[]  Upgrade activities as tolerated     [x]  Continue plan of care 
[]  Update interventions per flow sheet      
[]  Discharge due to:_ 
[]  Other:_ Tal Kruger, PT, DPT, CMTPT    1/3/2019

## 2019-01-04 ENCOUNTER — APPOINTMENT (OUTPATIENT)
Dept: PHYSICAL THERAPY | Age: 39
End: 2019-01-04
Payer: COMMERCIAL

## 2019-01-08 ENCOUNTER — HOSPITAL ENCOUNTER (OUTPATIENT)
Dept: PHYSICAL THERAPY | Age: 39
End: 2019-01-08
Payer: COMMERCIAL

## 2019-01-10 ENCOUNTER — PATIENT OUTREACH (OUTPATIENT)
Dept: OTHER | Age: 39
End: 2019-01-10

## 2019-01-14 ENCOUNTER — OFFICE VISIT (OUTPATIENT)
Dept: NEUROLOGY | Age: 39
End: 2019-01-14

## 2019-01-14 VITALS
DIASTOLIC BLOOD PRESSURE: 88 MMHG | SYSTOLIC BLOOD PRESSURE: 134 MMHG | OXYGEN SATURATION: 99 % | HEART RATE: 85 BPM | WEIGHT: 167 LBS | RESPIRATION RATE: 18 BRPM | BODY MASS INDEX: 29.58 KG/M2

## 2019-01-14 RX ORDER — AZELASTINE HYDROCHLORIDE, FLUTICASONE PROPIONATE 137; 50 UG/1; UG/1
SPRAY, METERED NASAL
COMMUNITY
End: 2020-06-08 | Stop reason: ALTCHOICE

## 2019-01-14 RX ORDER — RIZATRIPTAN BENZOATE 10 MG/1
TABLET ORAL
Qty: 9 TAB | Refills: 2 | Status: SHIPPED | OUTPATIENT
Start: 2019-01-14 | End: 2019-03-19 | Stop reason: SDUPTHER

## 2019-01-14 NOTE — PROGRESS NOTES
[address] PATIENT EVALUATION/CONSULTATION 
  
 
PATIENT NAME: Edu Paulson MRN: [id number] REASON FOR CONSULTATION: Headaches 01/14/19 Previous records (physician notes, laboratory reports, and radiology reports) and imaging studies were reviewed and summarized. My recommendations will be communicated back to the patient's physician(s) via electronic medical record and/or by [address] mail. HISTORY OF PRESENT ILLNESS: 
Edu Paulson is a 41 y.o. right handed female presenting for evaluation of headaches. Headaches began in her teens with recent increasing frequency over the past year. Location: R hemisphere, R occipital/frontal or R retro-orbital 
Character: Throbbing Intensity: On average 7/10 Frequency: 10-15 # HA free days per month: 6-7 Duration: 3 days Aura: Floaters-white Associated Sx with HA: +nausea/vomiting, +photophobia. Denies unilateral ptosis, conjunctival injection, lacrimation, sweating Neurological ROS: Denies focal weakness, numbness associated with headaches. +Vision loss either eye with migraines. Systemic ROS:  
Caffeine use: Rarely H/O Head trauma: None Depressive or anxiety Sx: None Denies snoring Any change in pattern of HA? See above Triggers: Luz-menstrual on occasion. No worsening reported with cough/sneeze, bending over Alleviating factors:  Hot compress FHx HA/migraine: Sister, Mother Treatment so far: Rizatriptan Previously: Fioricet. TPM-non effective, body aches. Verapamil for BP in the past.  Sertraline for depression in the past.   
 
Investigations so far:  None PAST MEDICAL HISTORY: 
Past Medical History:  
Diagnosis Date  Arrhythmia 2008 and 2011 Ablation for SVT and WPW  Arrhythmia atrial   
 A-Fib  Arthritis  Asthma  Chronic pain   
 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  
 connective tissue disorder  GERD (gastroesophageal reflux disease)  Herpes simplex without mention of complication   
 outbreak at 17 but never since then  IBS (irritable bowel syndrome) since childhood  
 irritable bowel syndrome  Migraine  Migraine  Nausea & vomiting  Ovarian cyst 2009  Post partum depression Post Partum Depression  PUD (peptic ulcer disease)  Thromboembolus ([address]  Unspecified adverse effect of anesthesia *Felt \"awake\" during procedure/ *able to hear staff during procedure.  Unspecified adverse effect of anesthesia   
 with C- section the spinal did not take and had to be put to sleep  Palacios-Parkinson-White syndrome  Marie-Parkinson-White syndrome PAST SURGICAL HISTORY: 
Past Surgical History:  
Procedure Laterality Date [facility] 8[facility] ONLY  2007  HX  SECTION    
 c section x 2  
 HX CHOLECYSTECTOMY  HX HEENT  As Child \"tubes in each ear\" as child  HX SVT ABLATION    
 ,   HX TONSILLECTOMY  2007  
 tonsils FAMILY HISTORY:  
Family History Problem Relation Age of Onset  Cancer Mother 40  
     ovarian  Migraines Mother  Heart Disease Father  Migraines Father  Cancer Maternal Grandfather   
     lung  Psychiatric Disorder Maternal Grandmother  Parkinson's Disease Paternal Grandfather  Migraines Sister  Cancer Paternal Grandmother   
     breast cancer  Asthma Child  Eczema Child SOCIAL HISTORY: 
Social History Socioeconomic History  Marital status:  Spouse name: Not on file  Number of children: Not on file  Years of education: Not on file  Highest education level: Not on file Tobacco Use  Smoking status: Never Smoker  Smokeless tobacco: Never Used Substance and Sexual Activity  Alcohol use: Yes Alcohol/week: 0.5 - 1.0 oz Types: 1 - 2 Glasses of wine per week Comment: 1  Drug use: No  
 Sexual activity: Yes  
 Partners: Male Birth control/protection: Pill MEDICATIONS:  
Current Outpatient Medications Medication Sig Dispense Refill  azelastine-fluticasone (DYMISTA) 137-50 mcg/spray spry by Nasal route.  ibuprofen (MOTRIN) 600 mg tablet Take 1 Tab by mouth every six (6) hours as needed for Pain. 45 Tab 1  
 norgestimate-ethinyl estradiol (TRI-PREVIFEM, 28,) 0.18/0.215/0.25 mg-35 mcg (28) tab Take  by mouth.  rizatriptan (MAXALT) 10 mg tablet TAKE 1 TABLET BY MOUTH STAT, MAY REPEAT IN 2 HOURS IF NEEDED. MAX: 2 TABS DAILY  5  
 
 
 
ALLERGIES: 
Allergies Allergen Reactions  Lisinopril Swelling  Morphine Other (comments) Chest pain REVIEW OF SYSTEMS: 
10 point ROS reviewed with patient. Please see scanned document under media. PHYSICAL EXAM: 
Vital Signs:  
Visit Vitals /88 Pulse 85 Resp 18 Wt 75.8 kg (167 lb) SpO2 99% BMI 29.58 kg/m² General Medical Exam: 
General:  Well appearing, comfortable, in no apparent distress. Eyes/ENT: see cranial nerve examination. Neck: No masses appreciated. Full range of motion without tenderness. Respiratory:  Clear to auscultation, good air entry bilaterally. Cardiac:  Regular rate and rhythm, no murmur. GI:  Soft, non-tender, non-distended abdomen. Bowel sounds normal. No masses, organomegaly. Extremities:  No deformities, edema, or skin discoloration. Skin:  No rashes or lesions. Neurological: · Mental Status:  Alert and oriented to person, place, and time with fluent speech. · Cranial Nerves:  
CNII/III/IV/VI: Optic disc w/clear margins b/l, visual fields full to confrontation, EOMI, PERRL, no ptosis or nystagmus. CN V: Facial sensation intact bilaterally, masseter 5/5  
CN VII: Facial muscles symmetric and strong CN VIII: Hears finger rub well bilaterally, intact vestibular function CN IX/X: Normal palatal movement CN XI: Full strength shoulder shrug bilaterally CN XII: Tongue protrusion full and midline without fasciculation or atrophy · Motor: Normal tone and muscle bulk with no pronator drift. No atrophy or fasciculations present on examination. Individual muscle group testing: 
Shoulder abduction:   Left:5/5   Right : 5/5 Shoulder adduction:   Left:5/5   Right : 5/5 Elbow flexion:      Left:5/5   Right : 5/5 Elbow extension:    Left:5/5   Right : 5/5 Wrist flexion:    Left:5/5   Right : 5/5 Wrist extension:    Left:5/5   Right : 5/5 Arm pronation:   Left:5/5   Right : 5/5 Arm supination:   Left:5/5   Right : 5/5 Finger flexion:    Left:5/5   Right : 5/5 Finger extension:   Left:5/5   Right : 5/5 Finger abduction:  Left:5/5   Right : 5/5 Finger adduction:   Left:5/5   Right : 5/5 Hip flexion:     Left:5/5   Right : 5/5 Hip extension:   Left:5/5   Right : 5/5 Knee flexion:    Left:5/5   Right : 5/5 Knee extension:   Left:5/5   Right : 5/5 Dorsiflexion:     Left:5/5   Right : 5/5 Plantar flexion:    Left:5/5   Right : 5/5 · MSRs: No crossed adductors or clonus. RIGHT  LEFT Brachioradialis 2+ 2+ Biceps 2+ 2+ Triceps 2+ 2+ Knee 2+ 2+ Achilles 2+ 2+ Plantar response Downward Downward · Sensation: Normal and symmetric perception of pinprick, temperature, light touch, proprioception, and vibration; (-) Romberg. · Coordination: No dysmetria. Normal rapid alternating movements; finger-to-nose and heel-to- shin testing are within normal limits. · Gait: Normal native gait ASSESSMENT:   
  ICD-10-CM ICD-9-CM 1. Chronic migraine G43.709 29.70   
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 Neurological examination is non-focal.  Suspect a hereditary component given her strong family history.   Advised that she transition off of combination OCPs due to potential stroke risk in association with her type of migraines. Discussed options for migraine ppx. She has failed TPM in the past due to side effects and is not a candidate for TCA therapy due to h/o cardiac arrhythmia (Afib/WPW). We will initiate a trial of Aimovig and monitor for clinical efficacy. First dose given in the office today which she tolerated well. Headache education Discussed pathophysiology of headache. Discussed use of headache diary. Discussed treatment options, both abortive and preventive medications. Instructed patient about medications and potential side effects. Discussed medication overuse headache and to limit use of analgesics to less than 2 doses per week. PLAN: 
· Trial of Aimovig 70mg injection/monthly · Cont. Rizatriptan for rescue headache tx Follow-up Disposition: 
Return in about 2 months (around 3/14/2019). Clayton Sandy DO Staff Neurologist 
Diplomate, [facility] [employer]iat[employer] & Neurology

## 2019-01-15 ENCOUNTER — TELEPHONE (OUTPATIENT)
Dept: NEUROLOGY | Age: 39
End: 2019-01-15

## 2019-01-15 ENCOUNTER — APPOINTMENT (OUTPATIENT)
Dept: PHYSICAL THERAPY | Age: 39
End: 2019-01-15
Payer: COMMERCIAL

## 2019-01-15 NOTE — TELEPHONE ENCOUNTER
Re: Omer Haq    Denial received from [address] [place] [address]. Per denial notification:  \"Your Omer Pilar plan approved criteria covers this drug when you have a contraindication to all the alternatives, or if complete and valid documentation is provided of you trying and having an inadequate treatment response or intolerance to the required number of formulary alternatives. Your use of this drug does not meet the requirement. Formulary alternatives are: Ajovy and Emgality. \"    PA denied because pt has not tried [facility] and [facility]. Pt can still receive the medication by using the [employer]. An appeal is also an option. Appeal instructions will be placed in Dr. Pradeep Fisher folder for review.

## 2019-01-15 NOTE — TELEPHONE ENCOUNTER
Re: Briana Mosquera    PA request received from pt's Mindy Perry 44. PA submitted via CMM to [facility]. Pending status:  \"Your information has been submitted to [employer]. To check for an updated outcome later, reopen this PA request from your dashboard. If [facility] has not responded to your request within 24 hours, contact [employer] at [phone]. \"      Will update when determination is made.

## 2019-01-17 ENCOUNTER — DOCUMENTATION ONLY (OUTPATIENT)
Dept: OTHER | Age: 39
End: 2019-01-17

## 2019-01-22 ENCOUNTER — APPOINTMENT (OUTPATIENT)
Dept: PHYSICAL THERAPY | Age: 39
End: 2019-01-22
Payer: COMMERCIAL

## 2019-01-23 ENCOUNTER — TELEPHONE (OUTPATIENT)
Dept: NEUROLOGY | Age: 39
End: 2019-01-23

## 2019-01-23 NOTE — TELEPHONE ENCOUNTER
Left message for patient on how to obtain access card information. Any further questions to call back.

## 2019-01-29 ENCOUNTER — APPOINTMENT (OUTPATIENT)
Dept: PHYSICAL THERAPY | Age: 39
End: 2019-01-29
Payer: COMMERCIAL

## 2019-02-28 ENCOUNTER — ANESTHESIA EVENT (OUTPATIENT)
Dept: ENDOSCOPY | Age: 39
End: 2019-02-28
Payer: COMMERCIAL

## 2019-02-28 ENCOUNTER — HOSPITAL ENCOUNTER (OUTPATIENT)
Age: 39
Setting detail: OUTPATIENT SURGERY
Discharge: HOME | End: 2019-02-28
Attending: INTERNAL MEDICINE | Admitting: INTERNAL MEDICINE
Payer: COMMERCIAL

## 2019-02-28 ENCOUNTER — ANESTHESIA (OUTPATIENT)
Dept: ENDOSCOPY | Age: 39
End: 2019-02-28
Payer: COMMERCIAL

## 2019-02-28 VITALS
BODY MASS INDEX: 28.35 KG/M2 | HEIGHT: 63 IN | DIASTOLIC BLOOD PRESSURE: 78 MMHG | WEIGHT: 160 LBS | HEART RATE: 77 BPM | TEMPERATURE: 98.2 F | OXYGEN SATURATION: 100 % | SYSTOLIC BLOOD PRESSURE: 132 MMHG | RESPIRATION RATE: 15 BRPM

## 2019-02-28 LAB — HCG UR QL: NEGATIVE

## 2019-02-28 PROCEDURE — 74011250636 HC RX REV CODE- 250/636

## 2019-02-28 PROCEDURE — 77030009426 HC FCPS BIOP ENDOSC BSC -B: Performed by: INTERNAL MEDICINE

## 2019-02-28 PROCEDURE — C1726 CATH, BAL DIL, NON-VASCULAR: HCPCS | Performed by: INTERNAL MEDICINE

## 2019-02-28 PROCEDURE — 88305 TISSUE EXAM BY PATHOLOGIST: CPT

## 2019-02-28 PROCEDURE — 81025 URINE PREGNANCY TEST: CPT

## 2019-02-28 PROCEDURE — 76040000019: Performed by: INTERNAL MEDICINE

## 2019-02-28 PROCEDURE — 76060000031 HC ANESTHESIA FIRST 0.5 HR: Performed by: INTERNAL MEDICINE

## 2019-02-28 RX ORDER — EPINEPHRINE 0.1 MG/ML
1 INJECTION INTRACARDIAC; INTRAVENOUS
Status: DISCONTINUED | OUTPATIENT
Start: 2019-02-28 | End: 2019-02-28 | Stop reason: HOSPADM

## 2019-02-28 RX ORDER — PANTOPRAZOLE SODIUM 20 MG/1
40 TABLET, DELAYED RELEASE ORAL
Qty: 180 TAB | Refills: 3 | Status: SHIPPED | OUTPATIENT
Start: 2019-02-28 | End: 2019-05-29

## 2019-02-28 RX ORDER — SODIUM CHLORIDE 0.9 % (FLUSH) 0.9 %
5-40 SYRINGE (ML) INJECTION AS NEEDED
Status: DISCONTINUED | OUTPATIENT
Start: 2019-02-28 | End: 2019-02-28 | Stop reason: HOSPADM

## 2019-02-28 RX ORDER — FENTANYL CITRATE 50 UG/ML
200 INJECTION, SOLUTION INTRAMUSCULAR; INTRAVENOUS
Status: DISCONTINUED | OUTPATIENT
Start: 2019-02-28 | End: 2019-02-28 | Stop reason: HOSPADM

## 2019-02-28 RX ORDER — SODIUM CHLORIDE 0.9 % (FLUSH) 0.9 %
5-40 SYRINGE (ML) INJECTION EVERY 8 HOURS
Status: DISCONTINUED | OUTPATIENT
Start: 2019-02-28 | End: 2019-02-28 | Stop reason: HOSPADM

## 2019-02-28 RX ORDER — FLUMAZENIL 0.1 MG/ML
0.2 INJECTION INTRAVENOUS
Status: DISCONTINUED | OUTPATIENT
Start: 2019-02-28 | End: 2019-02-28 | Stop reason: HOSPADM

## 2019-02-28 RX ORDER — NALOXONE HYDROCHLORIDE 0.4 MG/ML
0.4 INJECTION, SOLUTION INTRAMUSCULAR; INTRAVENOUS; SUBCUTANEOUS
Status: DISCONTINUED | OUTPATIENT
Start: 2019-02-28 | End: 2019-02-28 | Stop reason: HOSPADM

## 2019-02-28 RX ORDER — SODIUM CHLORIDE 9 MG/ML
INJECTION, SOLUTION INTRAVENOUS
Status: DISCONTINUED | OUTPATIENT
Start: 2019-02-28 | End: 2019-02-28 | Stop reason: HOSPADM

## 2019-02-28 RX ORDER — DEXTROMETHORPHAN/PSEUDOEPHED 2.5-7.5/.8
1.2 DROPS ORAL
Status: DISCONTINUED | OUTPATIENT
Start: 2019-02-28 | End: 2019-02-28 | Stop reason: HOSPADM

## 2019-02-28 RX ORDER — MIDAZOLAM HYDROCHLORIDE 1 MG/ML
.25-1 INJECTION, SOLUTION INTRAMUSCULAR; INTRAVENOUS
Status: DISCONTINUED | OUTPATIENT
Start: 2019-02-28 | End: 2019-02-28 | Stop reason: HOSPADM

## 2019-02-28 RX ORDER — PROPOFOL 10 MG/ML
INJECTION, EMULSION INTRAVENOUS AS NEEDED
Status: DISCONTINUED | OUTPATIENT
Start: 2019-02-28 | End: 2019-02-28 | Stop reason: HOSPADM

## 2019-02-28 RX ORDER — SODIUM CHLORIDE 9 MG/ML
100 INJECTION, SOLUTION INTRAVENOUS CONTINUOUS
Status: DISCONTINUED | OUTPATIENT
Start: 2019-02-28 | End: 2019-02-28 | Stop reason: HOSPADM

## 2019-02-28 RX ORDER — LIDOCAINE HYDROCHLORIDE 20 MG/ML
INJECTION, SOLUTION EPIDURAL; INFILTRATION; INTRACAUDAL; PERINEURAL AS NEEDED
Status: DISCONTINUED | OUTPATIENT
Start: 2019-02-28 | End: 2019-02-28 | Stop reason: HOSPADM

## 2019-02-28 RX ORDER — ATROPINE SULFATE 0.1 MG/ML
0.5 INJECTION INTRAVENOUS
Status: DISCONTINUED | OUTPATIENT
Start: 2019-02-28 | End: 2019-02-28 | Stop reason: HOSPADM

## 2019-02-28 RX ADMIN — PROPOFOL 50 MG: 10 INJECTION, EMULSION INTRAVENOUS at 14:31

## 2019-02-28 RX ADMIN — PROPOFOL 50 MG: 10 INJECTION, EMULSION INTRAVENOUS at 14:35

## 2019-02-28 RX ADMIN — PROPOFOL 50 MG: 10 INJECTION, EMULSION INTRAVENOUS at 14:32

## 2019-02-28 RX ADMIN — PROPOFOL 50 MG: 10 INJECTION, EMULSION INTRAVENOUS at 14:27

## 2019-02-28 RX ADMIN — PROPOFOL 50 MG: 10 INJECTION, EMULSION INTRAVENOUS at 14:23

## 2019-02-28 RX ADMIN — PROPOFOL 50 MG: 10 INJECTION, EMULSION INTRAVENOUS at 14:25

## 2019-02-28 RX ADMIN — PROPOFOL 50 MG: 10 INJECTION, EMULSION INTRAVENOUS at 14:26

## 2019-02-28 RX ADMIN — PROPOFOL 50 MG: 10 INJECTION, EMULSION INTRAVENOUS at 14:33

## 2019-02-28 RX ADMIN — PROPOFOL 50 MG: 10 INJECTION, EMULSION INTRAVENOUS at 14:24

## 2019-02-28 RX ADMIN — PROPOFOL 50 MG: 10 INJECTION, EMULSION INTRAVENOUS at 14:28

## 2019-02-28 RX ADMIN — PROPOFOL 50 MG: 10 INJECTION, EMULSION INTRAVENOUS at 14:30

## 2019-02-28 RX ADMIN — PROPOFOL 50 MG: 10 INJECTION, EMULSION INTRAVENOUS at 14:29

## 2019-02-28 RX ADMIN — SODIUM CHLORIDE: 9 INJECTION, SOLUTION INTRAVENOUS at 14:15

## 2019-02-28 RX ADMIN — LIDOCAINE HYDROCHLORIDE 40 MG: 20 INJECTION, SOLUTION EPIDURAL; INFILTRATION; INTRACAUDAL; PERINEURAL at 14:22

## 2019-02-28 NOTE — H&P
The patient is a 41year old female who presents with a complaint of difficulty swallowing. The patient presents for due to reoccurrence of symptoms. The onset of the difficulty swallowing has been gradual and has been occurring in an intermittent pattern for 1 year. The difficulty swallowing is described as being located in the substernal  area. The symptoms are aggravated by solids only. There has been no associated abdominal discomfort, abdominal pain, anxiety, chest pain, depression, dryness of mouth, frequent pneumonia, halitosis, heartburn, hiccups, hoarseness following dysphagia, ingestion of chemicals, long history of heartburn, loss of appetite, nasal regurgitation, neck pain, neck swelling, neurological disease, odynophagia, Raynaud's phenomenon, throat pain, tracheobronchial aspiration, vomiting, weight loss, wheezing, prior gastric bypass surgery, history of dementia, history of diabetes mellitus, history of esophageal stricture, history of endoscopic dilation, history of Nissen fundoplication, history of sclerotherapy, history of esophageal banding, history of radiation, history of achalasia, history of esophageal dysmotility, history of esophagectomy with gastric pull-up, history of esophagectomy with colonic interposition, history of neurological disease, history of connective tissue disease, Phillips's esophagus or eructation. Note for \"Difficulty swallowing \": she c/o intermittent dysphagia for solids for more than 1 year, takes NSAIDS for EDS Problem List/Past Medical (Jake Mathews; 2/11/2019 3:50 PM) Fang-Danlos syndrome, classic type (756.83  Q79.6)   Marie-Parkinson-White Syndrome   
Arthritis   
Migraine Headache   
Heart Murmur   
HEARTBURN (787.1  R12)   
Constipation (564.00  K59.00)   to start fiber supplements Abdominal pain, LLQ (left lower quadrant) (789.04  R10.32)   Generalized abdominal pain (789.07  R10.84)   Abdominal pain, RUQ (789.01  R10.11)   
 Diarrhea, functional (564.5  K59.1)   Abdominal pain, periumbilical (460.42  A45.29)   Abdominal pain, epigastric (789.06  R10.13)   Abdominal swelling, generalized (789.37  R19.07)   
Abdominal pain, left upper quadrant (789.02  R10.12)   Dysphagia (787.20  R13.10)   
 
[facility] Cholecystectomy   
 Delivery   
Tubes in ears eblation for WPW   
Tonsillectomy   
Cardiac Ablation   x2 Allergies ([facility]; 2019 3:50 PM) Lisinopril *ANTIHYPERTENSIVES*   
 
Medication History ([facility]; 2019 3:51 PM) Maxalt  (10MG Tablet, Oral as needed) Active. Aimovig  (70MG/ML Soln Auto-inj, Subcutaneous once a month) Active. Dymista  (137-50MCG/ACT Suspension, Nasal) Active. Medications Reconciled  
 
Family History ([facility]; 2019 3:50 PM) Migraine Headache   Sister. Heart Disease   Father. Lung Cancer   First Degree Relatives. Ovarian Cancer   Mother. Stroke   First Degree Relatives. Hypertension   Father. Coronary Artery Disease   Father. Heart attack (410.90  I21.9)   Father. Social History ([facility]; 2019 3:50 PM) Blood Transfusion   No. 
Employment status   Full-time. Radiologic Tech Tobacco Use   Never smoker. Marital status   . Alcohol Use   Occasional alcohol use. Diagnostic Studies History ([facility]; 2019 3:50 PM) Colonoscopy   Date: 3/2010. Endoscopy   Date: 2015. Health Maintenance [facility] Flu Vaccine   Date: . Pneumovax   none Review of Systems ([facility]; 2019 3:50 PM) General Present- Weight Gain and Weight Loss. Not Present- Chronic Fatigue and Poor Appetite. Skin Not Present- Itching, Rash and Skin Color Changes. HEENT Present- Ringing in the Ears. Not Present- Hearing Loss and Vertigo. Respiratory Not Present- Difficulty Breathing and TB exposure. Cardiovascular Not Present- Chest Pain, Use of Antibiotics before Dental Procedures and Use of Blood Thinners. Gastrointestinal Present- See HPI. Musculoskeletal Present- Arthritis. Not Present- Hip Replacement Surgery and Knee Replacement Surgery. Neurological Present- Frequent Headaches and Migraine Headaches. Not Present- Weakness. Psychiatric Not Present- Depression. Endocrine Not Present- Diabetes and Thyroid Problems. Hematology Present- Easy Bruising. Not Present- Anemia. Vitals (Jake KING [address]; 2/11/2019 3:54 PM) 
2/11/2019 3:50 PM 
Weight: 161 lb   Height: 63 in  
Body Surface Area: 1.76 m²   Body Mass Index: 28.52 kg/m²   
Pulse: 80 (Regular)    Resp.: 20 (Unlabored)    
BP: 128/84 (Sitting, Left Arm, Standard) Physical Exam Michael Hogan MD; 2/11/2019 4:30 PM) General 
Mental Status - Alert. General Appearance - Cooperative, Pleasant, Not in acute distress. Orientation - Oriented X3. Build & Nutrition - Well nourished and Well developed. Voice - Normal. 
 
ENMT Mouth and Throat Oral Cavity/Oropharynx - Teeth - normal. Oropharynx - Normal. 
 
Chest and Lung Exam 
Chest and lung exam reveals  - normal excursion with symmetric chest walls, quiet, even and easy respiratory effort with no use of accessory muscles and on auscultation, normal breath sounds, no adventitious sounds and normal vocal resonance. Cardiovascular Cardiovascular examination reveals  - on palpation PMI is normal in location and amplitude, no palpable S3 or S4. Normal cardiac borders. [place] Suzy Auscultation Heart Sounds - S1 WNL and S2 WNL. Murmurs & Other Heart Sounds - Auscultation of the heart reveals - No Murmurs. Abdomen Palpation/Percussion Tenderness - Non-Tender. Rebound tenderness - No rebound. Rigidity (guarding) - No Rigidity. Liver - Normal size palpable at right costal margin. Spleen - Other Characteristics - Non Palpable.  Abdominal Mass Palpable - No masses. Other Characteristics - No Ascites. Auscultation Auscultation of the abdomen reveals - Bowel sounds normal. 
 
Peripheral Vascular Lower Extremity Inspection - Left - No Edema. Right - No Edema. Assessment & Plan Yoan Lopez MD; 2/11/2019 4:31 PM) Dysphagia (787.20  R13.10) Impression: she c/o intermittent dysphagia for solids for more than 1 year, takes NSAIDS for EDS 
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 Patient is to call me for any questions or concerns. Weight loss (783.21  R63.4) Date of Surgery Update: 
Edu Paulson was seen and examined. History and physical has been reviewed. The patient has been examined.  There have been no significant clinical changes since the completion of the originally dated History and Physical. 
 
Signed By: Dominique Hennessy MD   
 February 28, 2019 2:20 PM

## 2019-02-28 NOTE — DISCHARGE INSTRUCTIONS
[address]  [address] [facility], [address] [place] [address]    EGD DISCHARGE INSTRUCTIONS    Fredo Sheridan  [id number]  1980    Discomfort:  Sore throat- throat lozenges or warm salt water gargle  redness at IV site- apply warm compress to area; if redness or soreness persist- contact your physician  Gaseous discomfort- walking, belching will help relieve any discomfort  You may not operate a vehicle for 12 hours  You may not engage in an occupation involving machinery or appliances for rest of today  You may not drink alcoholic beverages for at least 12 hours  Avoid making any critical decisions for at least 24 hour  DIET  You may resume your regular diet - however -  remember your colon is empty and a heavy meal will produce gas. Avoid these foods:  vegetables, fried / greasy foods, carbonated drinks    ACTIVITY  You may resume your normal daily activities   Spend the remainder of the day resting -  avoid any strenuous activity. CALL M.D. ANY SIGN OF   Increasing pain, nausea, vomiting  Abdominal distension (swelling)  New increased bleeding (oral or rectal)  Fever (chills)  Pain in chest area  Bloody discharge from nose or mouth  Shortness of breath    Follow-up Instructions:   Call Dr. Drew Brush for any questions or problems and follow up with him in 2 months  Telephone # [phone]  Avoid NSAIDS  Take protonix daily for 6 months    ENDOSCOPY FINDINGS:   Your endoscopy showed small non bleeding ulcers in your stomach( biopsied), small hiatal hernia, esophageal ring which I dilated.     Signed By: Drew Brush MD     2/28/2019  2:44 PM

## 2019-02-28 NOTE — ANESTHESIA POSTPROCEDURE EVALUATION
Post-Anesthesia Evaluation and Assessment Patient: Eloisa Aviles MRN: [id number]  SSN: [id number] YOB: 1980  Age: 41 y.o. Sex: female I have evaluated the patient and they are stable and ready for discharge from the PACU. Cardiovascular Function/Vital Signs Visit Vitals /73 Pulse 86 Temp 36.8 °C (98.2 °F) Resp 16 Ht 5' 3\" (1.6 m) Wt 72.6 kg (160 lb) SpO2 97% Breastfeeding? No  
BMI 28.34 kg/m² Patient is status post MAC anesthesia for Procedure(s): ESOPHAGOGASTRODUODENOSCOPY (EGD) ESOPHAGOGASTRODUODENAL (EGD) BIOPSY 
ESOPHAGEAL DILATION. Nausea/Vomiting: None Postoperative hydration reviewed and adequate. Pain: 
Pain Scale 1: Numeric (0 - 10) (02/28/19 1311) Pain Intensity 1: 0 (02/28/19 1311) Managed Neurological Status: At baseline Mental Status, Level of Consciousness: Alert and  oriented to person, place, and time Pulmonary Status:  
O2 Device: CO2 nasal cannula (02/28/19 1437) Adequate oxygenation and airway patent Complications related to anesthesia: None Post-anesthesia assessment completed. No concerns Signed By: Gabriela Martínez MD   
 February 28, 2019 Procedure(s): ESOPHAGOGASTRODUODENOSCOPY (EGD) ESOPHAGOGASTRODUODENAL (EGD) BIOPSY 
ESOPHAGEAL DILATION. <BSHSIANPOST> Visit Vitals /73 Pulse 86 Temp 36.8 °C (98.2 °F) Resp 16 Ht 5' 3\" (1.6 m) Wt 72.6 kg (160 lb) SpO2 97% Breastfeeding? No  
BMI 28.34 kg/m²

## 2019-02-28 NOTE — PROGRESS NOTES

## 2019-02-28 NOTE — ANESTHESIA PREPROCEDURE EVALUATION
Anesthetic History PONV Review of Systems / Medical History Patient summary reviewed, nursing notes reviewed and pertinent labs reviewed Pulmonary Asthma Neuro/Psych Psychiatric history Cardiovascular Dysrhythmias Exercise tolerance: >4 METS 
  
GI/Hepatic/Renal 
  
GERD 
 
 
PUD Endo/Other Arthritis Other Findings Physical Exam 
 
Airway Mallampati: I 
TM Distance: > 6 cm Neck ROM: normal range of motion Mouth opening: Normal 
 
 Cardiovascular Rhythm: regular Rate: normal 
 
 
 
 Dental 
No notable dental hx Pulmonary Breath sounds clear to auscultation Abdominal 
 
 
 
 Other Findings Anesthetic Plan ASA: 2 Anesthesia type: MAC Induction: Intravenous Anesthetic plan and risks discussed with: Patient

## 2019-02-28 NOTE — PROCEDURES
[facility], [address] [place] [address]        Esophago- Gastroduodenoscopy (EGD) Procedure Note    Shaun Rodriguez  1980  [id number]      Procedure: Endoscopic Gastroduodenoscopy with biopsy, esophageal dilation    Indication:  Dysphagia/odynophagia     Pre-operative Diagnosis: see indication above    Post-operative Diagnosis: see findings below    : Geoffrey Saha MD    Surgical Assistant: None    Implants:  None    Referring Provider:  Porsha Guerrero MD      Anesthesia/Sedation:  MAC anesthesia Propofol        Procedure Details     After infomed consent was obtained for the procedure, with all risks and benefits of procedure explained the patient was taken to the endoscopy suite and placed in the left lateral decubitus position. Following sequential administration of sedation as per above, the endoscope was inserted into the mouth and advanced under direct vision to third portion of the duodenum. A careful inspection was made as the gastroscope was withdrawn, including a retroflexed view of the proximal stomach; findings and interventions are described below. Findings:   Esophagus:hiatal hernia 3 cm in size   schatzki ring, I dilated with CRE balloon (15,16.5 then 18 mm), kept at 18 mm for 1 minute  Random esophageal biopsies taken  Stomach: erosive gastritis with small non bleeding ulcers in antrum, biopsied  Duodenum/jejunum: normal      Therapies:  As above    Specimens: as above         EBL: None      Complications:   None; patient tolerated the procedure well. Impression:    -See post-procedure diagnoses.     Recommendations:  -No NSAIDS, -protonix 40 mg/d for 6 months  -f/u in 2 months    Signed By: Geoffrey Saha MD     2/28/2019  2:39 PM

## 2019-03-01 ENCOUNTER — OFFICE VISIT (OUTPATIENT)
Dept: FAMILY MEDICINE CLINIC | Age: 39
End: 2019-03-01

## 2019-03-01 VITALS
BODY MASS INDEX: 28.77 KG/M2 | OXYGEN SATURATION: 99 % | SYSTOLIC BLOOD PRESSURE: 128 MMHG | HEART RATE: 88 BPM | HEIGHT: 63 IN | RESPIRATION RATE: 16 BRPM | WEIGHT: 162.4 LBS | DIASTOLIC BLOOD PRESSURE: 87 MMHG | TEMPERATURE: 98.1 F

## 2019-03-01 DIAGNOSIS — R10.13 EPIGASTRIC PAIN: Primary | ICD-10-CM

## 2019-03-01 NOTE — PROGRESS NOTES
HISTORY OF PRESENT ILLNESS  El Landau is a 41 y.o. female. Patient reports burning of epigastric region, particularly when taking a deep breath starting today. Patient had upper GI with dilation of esophagus and multiple esophageal biopsies done yesterday. Patient has not started protonix, but will  today. Advised to avoid NSAIDS. No shortness of breath, difficulty swallowing, or fever reported. Cough is dry, but just burns. Visit Vitals  /87 (BP 1 Location: Right arm, BP Patient Position: Sitting)   Pulse 88   Temp 98.1 °F (36.7 °C) (Oral)   Resp 16   Ht 5' 3\" (1.6 m)   Wt 162 lb 6.4 oz (73.7 kg)   LMP 02/15/2019 (Approximate)   SpO2 99%   BMI 28.77 kg/m²       HPI    Review of Systems   Respiratory: Positive for cough (epigastric burning). Physical Exam   Constitutional: She is oriented to person, place, and time. She appears well-developed and well-nourished. HENT:   Mouth/Throat: Uvula is midline, oropharynx is clear and moist and mucous membranes are normal.   Cardiovascular: Normal rate, regular rhythm and normal heart sounds. Pulmonary/Chest: Effort normal and breath sounds normal.   Neurological: She is alert and oriented to person, place, and time. Skin: Skin is warm and dry. Psychiatric: She has a normal mood and affect. ASSESSMENT and PLAN    ICD-10-CM ICD-9-CM    1. Epigastric pain R10.13 789.06      No orders of the defined types were placed in this encounter.   follow-up with GI if no improvement over the next week or symptoms worsen  Start protonix as prescribed

## 2019-03-01 NOTE — PROGRESS NOTES
Chief Complaint   Patient presents with    Cough     Noted burning with breathing, patient had upper GI on yesterday with dilitation

## 2019-03-07 ENCOUNTER — TELEPHONE (OUTPATIENT)
Dept: NEUROLOGY | Age: 39
End: 2019-03-07

## 2019-03-07 NOTE — TELEPHONE ENCOUNTER
Re: Florence Garza    Received PA request from [facility]. PA was submitted on 1/15/19 and was denied. Denied because Ajovy and Emgality are on the pt's formulary and the pt has not tried and failed the formulary options. Is pt using the access card to receive Aimovig? Would Dr. Rudy Mejia like to switch her to Ajovy or Emgality?

## 2019-03-14 ENCOUNTER — OFFICE VISIT (OUTPATIENT)
Dept: FAMILY MEDICINE CLINIC | Age: 39
End: 2019-03-14

## 2019-03-14 ENCOUNTER — TELEPHONE (OUTPATIENT)
Dept: FAMILY MEDICINE CLINIC | Age: 39
End: 2019-03-14

## 2019-03-14 ENCOUNTER — HOSPITAL ENCOUNTER (OUTPATIENT)
Dept: GENERAL RADIOLOGY | Age: 39
Discharge: HOME | End: 2019-03-14
Payer: COMMERCIAL

## 2019-03-14 VITALS
HEART RATE: 86 BPM | DIASTOLIC BLOOD PRESSURE: 95 MMHG | RESPIRATION RATE: 16 BRPM | BODY MASS INDEX: 28.49 KG/M2 | OXYGEN SATURATION: 100 % | WEIGHT: 160.8 LBS | HEIGHT: 63 IN | TEMPERATURE: 97.9 F | SYSTOLIC BLOOD PRESSURE: 141 MMHG

## 2019-03-14 DIAGNOSIS — R05.9 COUGH: Primary | ICD-10-CM

## 2019-03-14 DIAGNOSIS — R05.9 COUGH: ICD-10-CM

## 2019-03-14 DIAGNOSIS — K22.89 ESOPHAGEAL PAIN: ICD-10-CM

## 2019-03-14 PROCEDURE — 71046 X-RAY EXAM CHEST 2 VIEWS: CPT

## 2019-03-14 RX ORDER — METHYLPREDNISOLONE 4 MG/1
TABLET ORAL
Qty: 1 DOSE PACK | Refills: 0 | Status: SHIPPED | OUTPATIENT
Start: 2019-03-14 | End: 2019-09-05 | Stop reason: ALTCHOICE

## 2019-03-14 NOTE — TELEPHONE ENCOUNTER
Called and spoke with patient, identifiers verified. Patient given results of Xray per Cindy Tsai NP and verbalized understanding.

## 2019-03-14 NOTE — TELEPHONE ENCOUNTER
----- Message from Che Panda NP sent at 3/14/2019  4:25 PM EDT -----  Please notify patient that chest xray was normal.  ----- Message -----  From: Reji, Rad Results In  Sent: 3/14/2019   1:18 PM  To: Che Panda NP

## 2019-03-14 NOTE — PROGRESS NOTES
HISTORY OF PRESENT ILLNESS  Shelby Damian is a 41 y.o. female. Patients reports a dry cough x 6 weeks since URI symptoms with burning of her esophagus since EGD 2 weeks ago. Cough doesn't change in relation to meals or position. She had EGD with dilation of esophagus and multiple esophageal biopsies on 2/28/19 and the burning sensation started the next day. Patient started protonix, but that has not helped. No difficulty swallowing. No fever or shortness of breath. She does reports mild allergy symptoms and takes dymista daily. No antihistamine orally. Visit Vitals  BP (!) 141/95   Pulse 86   Temp 97.9 °F (36.6 °C) (Oral)   Resp 16   Ht 5' 3\" (1.6 m)   Wt 160 lb 12.8 oz (72.9 kg)   LMP 03/04/2019   SpO2 100%   BMI 28.48 kg/m²       HPI    Review of Systems   Respiratory: Positive for cough. Physical Exam   Constitutional: She is oriented to person, place, and time. She appears well-developed and well-nourished. HENT:   Head: Normocephalic. Nose: Mucosal edema present. Mouth/Throat: Uvula is midline, oropharynx is clear and moist and mucous membranes are normal.   Cardiovascular: Normal rate, regular rhythm and normal heart sounds. Pulmonary/Chest: Effort normal and breath sounds normal.   Neurological: She is alert and oriented to person, place, and time. Skin: Skin is warm and dry. Psychiatric: She has a normal mood and affect. ASSESSMENT and PLAN    ICD-10-CM ICD-9-CM    1. Cough R05 786.2 methylPREDNISolone (MEDROL DOSEPACK) 4 mg tablet      XR CHEST PA LAT   2.  Esophageal pain K22.8 530.89      Orders Placed This Encounter    XR CHEST PA LAT    methylPREDNISolone (MEDROL DOSEPACK) 4 mg tablet   start zyrtec  If no improvement then follow-up with GI

## 2019-03-19 ENCOUNTER — OFFICE VISIT (OUTPATIENT)
Dept: NEUROLOGY | Age: 39
End: 2019-03-19

## 2019-03-19 VITALS
HEART RATE: 84 BPM | RESPIRATION RATE: 18 BRPM | SYSTOLIC BLOOD PRESSURE: 136 MMHG | BODY MASS INDEX: 28.34 KG/M2 | WEIGHT: 160 LBS | OXYGEN SATURATION: 98 % | DIASTOLIC BLOOD PRESSURE: 86 MMHG

## 2019-03-19 DIAGNOSIS — Q79.60 EHLERS-DANLOS SYNDROME: ICD-10-CM

## 2019-03-19 DIAGNOSIS — Z86.79 HISTORY OF WOLFF-PARKINSON-WHITE (WPW) SYNDROME: ICD-10-CM

## 2019-03-19 RX ORDER — RIZATRIPTAN BENZOATE 10 MG/1
TABLET ORAL
Qty: 9 TAB | Refills: 2 | Status: SHIPPED | OUTPATIENT
Start: 2019-03-19 | End: 2019-07-16 | Stop reason: SDUPTHER

## 2019-03-19 NOTE — PROGRESS NOTES
Neurology Clinic Follow up Note    Patient ID:  Edu Paulson  [id number]  41 y.o.  1980      Ms. Abelardo Rosales is here for follow up today of migraines. Last Appointment With Me:  1/14/2019       Interval History:   Pt returns for f/u of migraines. She reports headaches are much improved- only 2 migraines monthly presently. She is using Maxalt PRN for rescue tx which works well. Tolerating Aimovig injections without significant side effects. PMHx/ PSHx/ FHx/ SHx:  Reviewed and unchanged previous visit. 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  Unspecified adverse effect of anesthesia     with C- section the spinal did not take and had to be put to sleep    Palacios-Parkinson-White syndrome     Marie-Parkinson-White syndrome          ROS:  Comprehensive review of systems negative except for as noted above. Objective:       Meds:  Current Outpatient Medications   Medication Sig Dispense Refill    methylPREDNISolone (MEDROL DOSEPACK) 4 mg tablet Follow dose pack instructions 1 Dose Pack 0    pantoprazole (PROTONIX) 20 mg tablet Take 2 Tabs by mouth Daily (before breakfast) for 90 days.  180 Tab 3    azelastine-fluticasone (DYMISTA) 137-50 mcg/spray spry by Nasal route.  erenumab-aooe (AIMOVIG AUTOINJECTOR) 70 mg/mL atIn 70 mg by SubCUTAneous route every thirty (30) days. 1 Syringe 2    rizatriptan (MAXALT) 10 mg tablet TAKE 1 TABLET BY MOUTH STAT, MAY REPEAT IN 2 HOURS IF NEEDED. MAX: 2 TABS DAILY 9 Tab 2    norgestimate-ethinyl estradiol (TRI-PREVIFEM, 28,) 0.18/0.215/0.25 mg-35 mcg (28) tab Take  by mouth. Exam:  Visit Vitals  /86   Pulse 84   Resp 18   Wt 72.6 kg (160 lb)   LMP 03/04/2019   SpO2 98%   BMI 28.34 kg/m²     NEUROLOGICAL EXAM:  General: Awake, alert, speech fluent  CN: PERRL, EOMI without nystagmus, VFF to confrontation, facial sensation and strength are normal and symmetric, hearing is intact to finger rub bilaterally, palate and tongue movements are intact and symmetric. Motor: Normal tone, bulk and strength bilaterally. Reflexes: 2/4 and symmetric, plantar stimulation is flexor. Coordination: FNF, SONA, HTS intact. Sensation: LT intact throughout. Gait: Normal-based and steady. LABS  Results for orders placed or performed during the hospital encounter of 02/28/19   HCG URINE, QL. - POC   Result Value Ref Range    Pregnancy test,urine (POC) NEGATIVE  NEG         IMAGING:  MRI Results (most recent):  Results from [place] encounter on 05/04/18   MRI KNEE LT WO CONT    Narrative EXAM:  MRI KNEE LT WO CONT    INDICATION: Left knee pain    COMPARISON: None    TECHNIQUE: Axial T2 fat-saturated and proton density fat-saturated; coronal T1  and proton density fat-saturated; and sagittal T2 fat-saturated, proton density  fat-saturated, and gradient echo MRI of the left knee . CONTRAST:  None. FINDINGS: Bone marrow: Within normal limits. No acute fracture, dislocation, or  marrow replacing process. Joint fluid: None. Collateral ligaments and posterior, lateral corner: Intact. Medial meniscus: Intact. Lateral meniscus: Intact.     ACL and PCL: Intact. Tendons: Intact. Muscles: Within normal limits. Patellofemoral alignment: There is mild lateral tilting and subluxation of the  patella. Trochlear groove is not hypoplastic. TT-TG distance: 12 mm    Articular cartilage: Intact. No focal osteochondral lesion. Soft tissue mass: None. Impression IMPRESSION:  No evidence of internal derangement of the knee             Assessment:     Encounter Diagnoses     ICD-10-CM ICD-9-CM   1. Chronic migraine G43.709 346.70   2. History of Marie-Parkinson-White (WPW) syndrome Z86.79 V12.59   3. 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 Neurological examination is non-focal.  Suspect a hereditary component given her strong family history. Advised that she transition off of combination OCPs due to potential stroke risk in association with her type of migraines. Discussed options for migraine ppx. She has failed TPM in the past due to side effects and is not a candidate for TCA therapy due to h/o cardiac arrhythmia (Afib/WPW). Felisa Bhavik initiated after initial visit, and she appears to be tolerating this well with a significant reduction in headache frequency. Will continue current treatment. Headache education  Discussed pathophysiology of headache. Discussed use of headache diary. Discussed treatment options, both abortive and preventive medications. Instructed patient about medications and potential side effects. Discussed medication overuse headache and to limit use of analgesics to less than 2 doses per week. Plan:   · Cont. Aimovig 70mg injection/monthly  · Cont. Rizatriptan for rescue headache tx    Follow-up Disposition:  Return in about 6 months (around 9/19/2019).     Signed:  Rabia Ward, DO  3/19/2019

## 2019-04-11 ENCOUNTER — TELEPHONE (OUTPATIENT)
Dept: NEUROLOGY | Age: 39
End: 2019-04-11

## 2019-04-11 NOTE — TELEPHONE ENCOUNTER
Re: Tobi Bound    Patient has new pharmacy benefit plan. Called and s/w Chichi Boyer @ [facility] to setup PA over the phone. Answered all clinical questions. Per Chichi Boyer, case is now pending. Turnaround time for a determination is 24-72 hours. Our office will receive a fax with the determination. Pending status. Will update when determination is made.

## 2019-04-17 NOTE — TELEPHONE ENCOUNTER
Re: Lisa Schneider    Approval received from [address] [place] [address]. Auth # [id number]. Auth good 4/17/19 - 10/16/19. Faxed approval to patient's pharmacy. Fax confirmation received 4/17/19.

## 2019-05-08 NOTE — TELEPHONE ENCOUNTER
Patient in need of (aimovig) short refill -- patient said it's due and is confused to why it was denied. Please advise.     Best # [phone]

## 2019-07-17 NOTE — TELEPHONE ENCOUNTER
Requested Prescriptions     Pending Prescriptions Disp Refills    rizatriptan (MAXALT) 10 mg tablet [Pharmacy Med Name: RIZATRIPTAN 10 MG TABLET] 9 Tab 2     Sig: TAKE 1 TABLET BY MOUTH STAT, MAY REPEAT IN 2 HOURS IF NEEDED. MAX: 2 TABS DAILY     Patient is completely out and is experiencing awful migraines. Please advise.     Best # [phone]

## 2019-07-18 RX ORDER — RIZATRIPTAN BENZOATE 10 MG/1
TABLET ORAL
Qty: 9 TAB | Refills: 2 | Status: SHIPPED | OUTPATIENT
Start: 2019-07-18 | End: 2020-11-27 | Stop reason: SDUPTHER

## 2019-07-30 ENCOUNTER — ANESTHESIA EVENT (OUTPATIENT)
Dept: ENDOSCOPY | Age: 39
End: 2019-07-30
Payer: COMMERCIAL

## 2019-07-30 ENCOUNTER — HOSPITAL ENCOUNTER (OUTPATIENT)
Age: 39
Setting detail: OUTPATIENT SURGERY
Discharge: HOME | End: 2019-07-30
Attending: INTERNAL MEDICINE | Admitting: INTERNAL MEDICINE
Payer: COMMERCIAL

## 2019-07-30 ENCOUNTER — ANESTHESIA (OUTPATIENT)
Dept: ENDOSCOPY | Age: 39
End: 2019-07-30
Payer: COMMERCIAL

## 2019-07-30 VITALS
SYSTOLIC BLOOD PRESSURE: 117 MMHG | BODY MASS INDEX: 28.7 KG/M2 | OXYGEN SATURATION: 100 % | WEIGHT: 162 LBS | RESPIRATION RATE: 16 BRPM | HEART RATE: 72 BPM | TEMPERATURE: 97.8 F | HEIGHT: 63 IN | DIASTOLIC BLOOD PRESSURE: 75 MMHG

## 2019-07-30 LAB — HCG UR QL: NEGATIVE

## 2019-07-30 PROCEDURE — 76040000007: Performed by: INTERNAL MEDICINE

## 2019-07-30 PROCEDURE — 74011250636 HC RX REV CODE- 250/636

## 2019-07-30 PROCEDURE — 81025 URINE PREGNANCY TEST: CPT

## 2019-07-30 PROCEDURE — 76060000032 HC ANESTHESIA 0.5 TO 1 HR: Performed by: INTERNAL MEDICINE

## 2019-07-30 PROCEDURE — C1726 CATH, BAL DIL, NON-VASCULAR: HCPCS | Performed by: INTERNAL MEDICINE

## 2019-07-30 PROCEDURE — 77030018712 HC DEV BLLN INFL BSC -B: Performed by: INTERNAL MEDICINE

## 2019-07-30 RX ORDER — SODIUM CHLORIDE 9 MG/ML
INJECTION, SOLUTION INTRAVENOUS
Status: DISCONTINUED | OUTPATIENT
Start: 2019-07-30 | End: 2019-07-30 | Stop reason: HOSPADM

## 2019-07-30 RX ORDER — ATROPINE SULFATE 0.1 MG/ML
0.5 INJECTION INTRAVENOUS
Status: DISCONTINUED | OUTPATIENT
Start: 2019-07-30 | End: 2019-07-30 | Stop reason: HOSPADM

## 2019-07-30 RX ORDER — EPINEPHRINE 0.1 MG/ML
1 INJECTION INTRACARDIAC; INTRAVENOUS
Status: DISCONTINUED | OUTPATIENT
Start: 2019-07-30 | End: 2019-07-30 | Stop reason: HOSPADM

## 2019-07-30 RX ORDER — SODIUM CHLORIDE 0.9 % (FLUSH) 0.9 %
5-40 SYRINGE (ML) INJECTION AS NEEDED
Status: DISCONTINUED | OUTPATIENT
Start: 2019-07-30 | End: 2019-07-30 | Stop reason: HOSPADM

## 2019-07-30 RX ORDER — LIDOCAINE HYDROCHLORIDE 20 MG/ML
INJECTION, SOLUTION EPIDURAL; INFILTRATION; INTRACAUDAL; PERINEURAL AS NEEDED
Status: DISCONTINUED | OUTPATIENT
Start: 2019-07-30 | End: 2019-07-30 | Stop reason: HOSPADM

## 2019-07-30 RX ORDER — PANTOPRAZOLE SODIUM 40 MG/1
40 TABLET, DELAYED RELEASE ORAL DAILY
COMMUNITY
End: 2020-11-27

## 2019-07-30 RX ORDER — PROPOFOL 10 MG/ML
INJECTION, EMULSION INTRAVENOUS AS NEEDED
Status: DISCONTINUED | OUTPATIENT
Start: 2019-07-30 | End: 2019-07-30 | Stop reason: HOSPADM

## 2019-07-30 RX ORDER — SODIUM CHLORIDE 0.9 % (FLUSH) 0.9 %
5-40 SYRINGE (ML) INJECTION EVERY 8 HOURS
Status: DISCONTINUED | OUTPATIENT
Start: 2019-07-30 | End: 2019-07-30 | Stop reason: HOSPADM

## 2019-07-30 RX ORDER — MIDAZOLAM HYDROCHLORIDE 1 MG/ML
.25-5 INJECTION, SOLUTION INTRAMUSCULAR; INTRAVENOUS
Status: DISCONTINUED | OUTPATIENT
Start: 2019-07-30 | End: 2019-07-30 | Stop reason: HOSPADM

## 2019-07-30 RX ORDER — FENTANYL CITRATE 50 UG/ML
25-200 INJECTION, SOLUTION INTRAMUSCULAR; INTRAVENOUS
Status: DISCONTINUED | OUTPATIENT
Start: 2019-07-30 | End: 2019-07-30 | Stop reason: HOSPADM

## 2019-07-30 RX ORDER — DEXTROMETHORPHAN/PSEUDOEPHED 2.5-7.5/.8
1.2 DROPS ORAL
Status: DISCONTINUED | OUTPATIENT
Start: 2019-07-30 | End: 2019-07-30 | Stop reason: HOSPADM

## 2019-07-30 RX ORDER — FLUMAZENIL 0.1 MG/ML
0.2 INJECTION INTRAVENOUS
Status: DISCONTINUED | OUTPATIENT
Start: 2019-07-30 | End: 2019-07-30 | Stop reason: HOSPADM

## 2019-07-30 RX ORDER — NALOXONE HYDROCHLORIDE 0.4 MG/ML
0.4 INJECTION, SOLUTION INTRAMUSCULAR; INTRAVENOUS; SUBCUTANEOUS
Status: DISCONTINUED | OUTPATIENT
Start: 2019-07-30 | End: 2019-07-30 | Stop reason: HOSPADM

## 2019-07-30 RX ORDER — SODIUM CHLORIDE 9 MG/ML
50 INJECTION, SOLUTION INTRAVENOUS CONTINUOUS
Status: DISCONTINUED | OUTPATIENT
Start: 2019-07-30 | End: 2019-07-30 | Stop reason: HOSPADM

## 2019-07-30 RX ADMIN — SODIUM CHLORIDE: 9 INJECTION, SOLUTION INTRAVENOUS at 10:57

## 2019-07-30 RX ADMIN — PROPOFOL 150 MG: 10 INJECTION, EMULSION INTRAVENOUS at 10:57

## 2019-07-30 RX ADMIN — PROPOFOL 50 MG: 10 INJECTION, EMULSION INTRAVENOUS at 11:10

## 2019-07-30 RX ADMIN — PROPOFOL 50 MG: 10 INJECTION, EMULSION INTRAVENOUS at 11:13

## 2019-07-30 RX ADMIN — PROPOFOL 50 MG: 10 INJECTION, EMULSION INTRAVENOUS at 11:02

## 2019-07-30 RX ADMIN — PROPOFOL 50 MG: 10 INJECTION, EMULSION INTRAVENOUS at 11:06

## 2019-07-30 RX ADMIN — LIDOCAINE HYDROCHLORIDE 80 MG: 20 INJECTION, SOLUTION EPIDURAL; INFILTRATION; INTRACAUDAL; PERINEURAL at 10:57

## 2019-07-30 NOTE — ANESTHESIA POSTPROCEDURE EVALUATION
Post-Anesthesia Evaluation and Assessment    Patient: Bebo Valentino MRN: [id number]  SSN: [id number]    YOB: 1980  Age: 41 y.o. Sex: female      I have evaluated the patient and they are stable and ready for discharge from the PACU. Cardiovascular Function/Vital Signs  Visit Vitals  /75   Pulse 72   Temp 36.6 °C (97.8 °F)   Resp 16   Ht 5' 3\" (1.6 m)   Wt 73.5 kg (162 lb)   SpO2 100%   Breastfeeding? No   BMI 28.70 kg/m²       Patient is status post MAC anesthesia for Procedure(s):  COLONOSCOPY  ESOPHAGOGASTRODUODENOSCOPY (EGD)  ESOPHAGEAL DILATION. Nausea/Vomiting: None    Postoperative hydration reviewed and adequate. Pain:  Pain Scale 1: Numeric (0 - 10) (07/30/19 1143)  Pain Intensity 1: 0 (07/30/19 1143)   Managed    Neurological Status: At baseline    Mental Status, Level of Consciousness: Alert and  oriented to person, place, and time    Pulmonary Status:   O2 Device: Room air (07/30/19 1143)   Adequate oxygenation and airway patent    Complications related to anesthesia: None    Post-anesthesia assessment completed. No concerns    Signed By: Heide Lennox, MD     July 30, 2019              Procedure(s):  COLONOSCOPY  ESOPHAGOGASTRODUODENOSCOPY (EGD)  ESOPHAGEAL DILATION. MAC    <BSHSIANPOST>    Vitals Value Taken Time   /75 7/30/2019 11:43 AM   Temp 36.6 °C (97.8 °F) 7/30/2019 11:28 AM   Pulse 75 7/30/2019 11:43 AM   Resp 15 7/30/2019 11:43 AM   SpO2 93 % 7/30/2019 11:43 AM   Vitals shown include unvalidated device data.

## 2019-07-30 NOTE — DISCHARGE INSTRUCTIONS
[facility]        Hiatal Hernia: Care Instructions  Your Care Instructions  A hiatal hernia occurs when part of the stomach bulges into the chest cavity. A hiatal hernia may allow stomach acid and juices to back up into the esophagus (acid reflux). This can cause a feeling of burning, warmth, heat, or pain behind the breastbone. This feeling may often occur after you eat, soon after you lie down, or when you bend forward, and it may come and go. You also may have a sour taste in your mouth. These symptoms are commonly known as heartburn or reflux. But not all hiatal hernias cause symptoms. Follow-up care is a key part of your treatment and safety. Be sure to make and go to all appointments, and call your doctor if you are having problems. It's also a good idea to know your test results and keep a list of the medicines you take. How can you care for yourself at home? · Take your medicines exactly as prescribed. Call your doctor if you think you are having a problem with your medicine. · Do not take aspirin or other nonsteroidal anti-inflammatory drugs (NSAIDs), such as ibuprofen (Advil, Motrin) or naproxen (Aleve), unless your doctor says it is okay. Ask your doctor what you can take for pain. · Your doctor may recommend over-the-counter medicine. For mild or occasional indigestion, antacids such as Tums, Gaviscon, Maalox, or Mylanta may help. Your doctor also may recommend over-the-counter acid reducers, such as famotidine (Pepcid AC), cimetidine (Tagamet HB), ranitidine (Zantac 75 and Zantac 150), or omeprazole (Prilosec). Read and follow all instructions on the label. If you use these medicines often, talk with your doctor. · Change your eating habits. ? It's best to eat several small meals instead of two or three large meals. ? After you eat, wait 2 to 3 hours before you lie down. Late-night snacks aren't a good idea. ?  Chocolate, mint, and alcohol can make heartburn worse. They relax the valve between the esophagus and the stomach. ? Spicy foods, foods that have a lot of acid (like tomatoes and oranges), and coffee can make heartburn symptoms worse in some people. If your symptoms are worse after you eat a certain food, you may want to stop eating that food to see if your symptoms get better. · Do not smoke or chew tobacco.  · If you get heartburn at night, raise the head of your bed 6 to 8 inches by putting the frame on blocks or placing a foam wedge under the head of your mattress. (Adding extra pillows does not work.)  · Do not wear tight clothing around your middle. · Lose weight if you need to. Losing just 5 to 10 pounds can help. When should you call for help? Call your doctor now or seek immediate medical care if:    · You have new or worse belly pain.     · You are vomiting.    Watch closely for changes in your health, and be sure to contact your doctor if:    · You have new or worse symptoms of indigestion.     · You have trouble or pain swallowing.     · You are losing weight.     · You do not get better as expected. Where can you learn more? Go to http://dorita-nara.info/. Enter Y676 in the search box to learn more about \"Hiatal Hernia: Care Instructions. \"  Current as of: November 7, 2018  Content Version: 12.1  © 7326-4568 Healthwise, Incorporated. Care instructions adapted under license by [employer] (which disclaims liability or warranty for this information). If you have questions about a medical condition or this instruction, always ask your healthcare professional. [place] [address] any warranty or liability for your use of this information.          [address]    EGD and COLON DISCHARGE INSTRUCTIONS    Rodger Fernandez  [id number]  1980    Discomfort:  Redness at IV site- apply warm compress to area; if redness or soreness persist- contact your physician  There may be a slight amount of blood passed from the rectum  Gaseous discomfort- walking, belching will help relieve any discomfort  You may not operate a vehicle for 12 hours  You may not engage in an occupation involving machinery or appliances for rest of today  You may not drink alcoholic beverages for at least 12 hours  Avoid making any critical decisions for at least 24 hour  DIET:  You may resume your regular diet - however -  remember your colon is empty and a heavy meal will produce gas. Avoid these foods:  vegetables, fried / greasy foods, carbonated drinks     ACTIVITY:  You may  resume your normal daily activities it is recommended that you spend the remainder of the day resting -  avoid any strenuous activity. CALL M.D. ANY SIGN OF:   Increasing pain, nausea, vomiting  Abdominal distension (swelling)  New increased bleeding (oral or rectal)  Fever (chills)  Pain in chest area, shortness of breath  Patient Education        Hemorrhoids: Care Instructions  Your Care Instructions    Hemorrhoids are enlarged veins that develop in the anal canal. Bleeding during bowel movements, itching, swelling, and rectal pain are the most common symptoms. They can be uncomfortable at times, but hemorrhoids rarely are a serious problem. You can treat most hemorrhoids with simple changes to your diet and bowel habits. These changes include eating more fiber and not straining to pass stools. Most hemorrhoids do not need surgery or other treatment unless they are very large and painful or bleed a lot. Follow-up care is a key part of your treatment and safety. Be sure to make and go to all appointments, and call your doctor if you are having problems. It's also a good idea to know your test results and keep a list of the medicines you take. How can you care for yourself at home? · Sit in a few inches of warm water (sitz bath) 3 times a day and after bowel movements.  The warm water helps with pain and itching. · Put ice on your anal area several times a day for 10 minutes at a time. Put a thin cloth between the ice and your skin. Follow this by placing a warm, wet towel on the area for another 10 to 20 minutes. · Take pain medicines exactly as directed. ? If the doctor gave you a prescription medicine for pain, take it as prescribed. ? If you are not taking a prescription pain medicine, ask your doctor if you can take an over-the-counter medicine. · Keep the anal area clean, but be gentle. Use water and a fragrance-free soap, such as [place], or use baby wipes or medicated pads, such as Tucks. · Wear cotton underwear and loose clothing to decrease moisture in the anal area. · Eat more fiber. Include foods such as whole-grain breads and cereals, raw vegetables, raw and dried fruits, and beans. · Drink plenty of fluids, enough so that your urine is light yellow or clear like water. If you have kidney, heart, or liver disease and have to limit fluids, talk with your doctor before you increase the amount of fluids you drink. · Use a stool softener that contains bran or psyllium. You can save money by buying bran or psyllium (available in bulk at most health food stores) and sprinkling it on foods or stirring it into fruit juice. Or you can use a product such as Metamucil or Hydrocil. · Practice healthy bowel habits. ? Go to the bathroom as soon as you have the urge. ? Avoid straining to pass stools. Relax and give yourself time to let things happen naturally. ? Do not hold your breath while passing stools. ? Do not read while sitting on the toilet. Get off the toilet as soon as you have finished. · Take your medicines exactly as prescribed. Call your doctor if you think you are having a problem with your medicine. When should you call for help? Call 911 anytime you think you may need emergency care.  For example, call if:    · You pass maroon or very bloody stools.    Call your doctor now or seek immediate medical care if:    · You have increased pain.     · You have increased bleeding.    Watch closely for changes in your health, and be sure to contact your doctor if:    · Your symptoms have not improved after 3 or 4 days. Where can you learn more? Go to http://dorita-nara.info/. Enter F228 in the search box to learn more about \"Hemorrhoids: Care Instructions. \"  Current as of: November 7, 2018  Content Version: 12.1  © 9265-3386 [employer]. Care instructions adapted under license by [employer] (which disclaims liability or warranty for this information). If you have questions about a medical condition or this instruction, always ask your healthcare professional. [place] [address] any warranty or liability for your use of this information. Follow-up Instructions:   Call Dr. Renata Baldwin for any questions or problems at [phone] and follow up with him in 2 to 3 months          ENDOSCOPY FINDINGS:   Your colonoscopy was normal   Your endoscopy showed small hiatal hernia and esophageal ring, dilated.   Telephone # [phone]      Signed By: Renata Baldwin MD     7/30/2019  11:29 AM       DISCHARGE SUMMARY from Nurse    The following personal items collected during your admission are returned to you:   Dental Appliance: Dental Appliances: None  Vision: Visual Aid: None  Hearing Aid:    Jewelry:    Clothing:    Other Valuables:    Valuables sent to safe:

## 2019-07-30 NOTE — PROGRESS NOTES

## 2019-07-30 NOTE — PROGRESS NOTES
CRE balloon dilatation of the esophagus   18 mm Balloon inflated to 3 ATMs and held for 2 seconds. 19 mm Balloon inflated to 4.5 ATMs and held for 2 seconds. 20 mm Balloon inflated to 6 ATMs and held for 60 seconds. No subcutaneous crepitus of the chest or cervical region was noted post dilatation.

## 2019-07-30 NOTE — PROCEDURES
[address]  [address] [facility], [address] [place] [address]      Colonoscopy Operative Report    Homero Sender  [id number]  1980      Procedure Type:   Colonoscopy --diagnostic     Indications:    Abdominal pain, LLQ, Abdominal pain, RLQ       Pre-operative Diagnosis: see indication above    Post-operative Diagnosis:  See findings below    :  Matthew Schafer MD    Surgical Assistant: None    Implants:  None    Referring Provider: Saqib Pelaez MD      Sedation:  MAC anesthesia Propofol      Procedure Details:  After informed consent was obtained with all risks and benefits of procedure explained and preoperative exam completed, the patient was taken to the endoscopy suite and placed in the left lateral decubitus position. Upon sequential sedation as per above, a digital rectal exam was performed demonstrating internal hemorrhoids. The Olympus videocolonoscope  was inserted in the rectum and carefully advanced to the cecum, which was identified by the ileocecal valve and appendiceal orifice, terminal ileum. The cecum was identified by the ileocecal valve and appendiceal orifice. The quality of preparation was excellent. The colonoscope was slowly withdrawn with careful evaluation between folds. Retroflexion in the rectum was completed . Findings:   Rectum: normal  Small internal hemorrhoids  Sigmoid: normal  Descending Colon: normal  Transverse Colon: normal  Ascending Colon: normal  Cecum: normal  Terminal Ileum: normal      Specimen Removed:  none    Complications: None. EBL:  None. Impression:    see findings    Recommendations: --For colon cancer screening in this average-risk patient, colonoscopy may be repeated in 10 years.     To change linzess 145 to every other day because of diarrhea side effect  F/u in 2 to 3 months  Signed By: Matthew Schafer MD     7/30/2019  11:27 AM

## 2019-07-30 NOTE — ROUTINE PROCESS
Lulú Rota  1980  [id number]    Situation:  Verbal report received from: Lore Robles RN  Procedure: Procedure(s):  COLONOSCOPY  ESOPHAGOGASTRODUODENOSCOPY (EGD)  ESOPHAGEAL DILATION    Background:    Preoperative diagnosis: DIARRHEA  Postoperative diagnosis: 1. - Hiatal Hernia  2. - Schatzki's Ring  3. - Hemorrhoids    :  Dr. Kaci Murdock  Assistant(s): Endoscopy Technician-1: Kylah Pichardo  Endoscopy RN-1: Gin Hardwick RN    Specimens: * No specimens in log *  H. Pylori  no    Assessment:  Intra-procedure medications     Anesthesia gave intra-procedure sedation and medications, see anesthesia flow sheet yes    Intravenous fluids: NS@ KVO     Vital signs stable     Abdominal assessment: round and soft     Recommendation:  Discharge patient per MD order.     Family or Friend   Permission to share finding with family or friend yes

## 2019-07-30 NOTE — H&P
The patient is a 41year old female who presents with a complaint of Constipation. Reason for encounter: due to new symptoms. The onset of the constipation has been gradual and has been occurring in a persistent pattern for 6 months. The frequency of bowel movements has been 2 per week. The stools are hard stools and rabbit pellet stools (scybalous) and  the amount of stool per bowel movement is small .  The symptoms are relieved by laxatives. The symptoms have been associated with abdominal pain, intermittent diarrhea and weight loss (20 pounds, intentional since August), while the symptoms have not been associated with bleeding per rectum or nausea. The constipation is characterized as not well controlled. The patient has been using laxatives and polyethylene glycol (PEG) (Miralax). Current medication use: considered partially effective by patient. The patient had a colonoscopy 9 year(s) ago. Note for \"Constipation\": She started Aimovig in January for migraines and reports worsening of chronic IBS-C symptoms since that time. She was only having a bowel movement every 2 weeks. She has been on OTCs, herbal tea, MiraLax, and exlax. She reports a bowel movement every 3-4 days on this regimen but will not have a bowel movement without exlax. She does get some diarrhea when she finally does have a bowel movement. She also has abdominal pain after 2-3 days without having a bowel movement. She stopped Aimovig last month despite significant improvement in migraines, due to constipation. Ideally she would like to get back on Aimovig. She is not having any rectal bleeding or unintentional weight loss. 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)    Marie-Parkinson-White Syndrome    Arthritis    Migraine Headache    Heart Murmur    Generalized abdominal pain (789.07  R10.84)    Abdominal pain, RUQ (789.01  R10.11)    Diarrhea, functional (564.5  K59.1)    HEARTBURN (787.1  R12)    Constipation (564.00  K59.00)   to start fiber supplements  Abdominal pain, LLQ (left lower quadrant) (789.04  R10.32)    Abdominal pain, left upper quadrant (789.02  R10.12)    Dysphagia (787.20  R13.10)    Weight loss (783.21  R63.4)    Abdominal pain, periumbilical (738.10  A50.14)    Abdominal pain, epigastric (789.06  R10.13)    Abdominal swelling, generalized (789.37  R19.07)      Past Surgical History Ramona Degrootiver; 6/3/2019 3:26 PM)  Cholecystectomy     Delivery    Tubes in ears eblation for WPW    Tonsillectomy    Cardiac Ablation   x2    Allergies Ramona Simple R. Constancia Scriver; 6/3/2019 3:26 PM)  Lisinopril *ANTIHYPERTENSIVES*      Medication History (Kari Ortega; 6/3/2019 3:26 PM)  Maxalt  (10MG Tablet, Oral as needed) Active. Aimovig  (70MG/ML Soln Auto-inj, Subcutaneous once a month) Active. Dymista  (137-50MCG/ACT Suspension, Nasal) Active. Medications Reconciled     Family History Ramona Mariana Ortega; 6/3/2019 3:26 PM)  Migraine Headache   Sister. Heart Disease   Father. Lung Cancer   First Degree Relatives. Ovarian Cancer   Mother. Stroke   First Degree Relatives. Hypertension   Father. Coronary Artery Disease   Father. Heart attack (410.90  I21.9)   Father. Social History Ramona Mariana Ortega; 6/3/2019 3:26 PM)  Blood Transfusion   No.  Employment status   Full-time. [employer]  Tobacco Use   Never smoker. Marital status   . Alcohol Use   Occasional alcohol use. Diagnostic Studies History Ramona Mariana Ortega; 6/3/2019 3:26 PM)  Colonoscopy   Date: 3/2010. Endoscopy   Date: 2015. Health Maintenance History Ramona Mariana Ortega; 6/3/2019 3:26 PM)  Flu Vaccine   Date: . Pneumovax   none        Review of Systems Ramona Mariana Degrootiver; 6/3/2019 3:26 PM)  General Not Present- Chronic Fatigue, Poor Appetite, Weight Gain and Weight Loss.   Skin Not Present- Itching, Rash and Skin Color Changes. HEENT Not Present- Hearing Loss and Vertigo. Respiratory Not Present- Difficulty Breathing and TB exposure. Cardiovascular Not Present- Chest Pain, Use of Antibiotics before Dental Procedures and Use of Blood Thinners. Gastrointestinal Present- See HPI. Musculoskeletal Not Present- Arthritis, Hip Replacement Surgery and Knee Replacement Surgery. Neurological Not Present- Weakness. Psychiatric Not Present- Depression. Endocrine Not Present- Diabetes and Thyroid Problems. Hematology Not Present- Anemia. Vitals Alejandra Burn MANISH Leon; 6/3/2019 3:28 PM)  6/3/2019 3:25 PM  Weight: 164 lb   Height: 63 in   Body Surface Area: 1.78 m²   Body Mass Index: 29.05 kg/m²    Pulse: 76 (Regular)    Resp.: 20 (Unlabored)     BP: 118/80 (Sitting, Left Arm, Standard)              Physical Exam Christiano LOPEZRoseann Luciano AGACNP; 6/3/2019 3:52 PM)  General  Mental Status - Alert. General Appearance - Cooperative, Pleasant, Not in acute distress. Orientation - Oriented X3. Build & Nutrition - Well nourished and Well developed. Integumentary  General Characteristics  Color - normal coloration of skin. Temperature - normal warmth is noted. Mobility & Turgor - normal mobility and turgor. Head and Neck  Head - normocephalic, atraumatic with no lesions or palpable masses. Neck  Global Assessment - full range of motion and supple. Trachea - midline. Eye  Eyeball - Bilateral - No Exophthalmos. Sclera/Conjunctiva - Left - No Jaundice. Sclera/Conjunctiva - Right - No Jaundice. Chest and Lung Exam  Chest and lung exam reveals  - quiet, even and easy respiratory effort with no use of accessory muscles. Auscultation  Breath sounds - Normal. Adventitious sounds - No Adventitious sounds. Cardiovascular  Auscultation  Rhythm - Regular, No Tachycardia, No Bradycardia . Heart Sounds - Normal heart sounds , S1 WNL and S2 WNL, No S3, No Summation Gallop.  Murmurs & Other Heart Sounds - Auscultation of the heart reveals - No Murmurs. Abdomen  Inspection  Inspection of the abdomen reveals - Soft. Palpation/Percussion  Tenderness - Non-Tender. Rebound tenderness - No rebound. Rigidity (guarding) - No Rigidity. Dullness to percussion - No abnormal dullness to percussion. Liver - No hepatosplenomegaly. Abdominal Mass Palpable - No masses. Other Characteristics - No Ascites. Auscultation  Auscultation of the abdomen reveals - Bowel sounds normal, No Abdominal bruits and No Succussion splash. Rectal - Did not examine. Peripheral Vascular  Upper Extremity  Inspection - Left - Normal - No Clubbing, No Cyanosis, No Edema, Pulses Intact. Right - Normal - No Clubbing, No Cyanosis, No Edema, Pulses Intact. Palpation - Edema - Left - No edema. Right - No edema. Neurologic  Neurologic evaluation reveals  - Cranial nerves grossly intact, no focal neurologic deficits. Musculoskeletal  Global Assessment  Gait and Station - normal gait and station. Assessment & Plan (Nahed HAYS; 6/3/2019 4:00 PM)  Abdominal pain (789.00  R10.9)  Irritable bowel syndrome with constipation (564.1  K58.1)  Story: EGD 2/28/19: 3 cm hiatal hernia, Schatzki's ring dilated, erosive gastritis with small non-bleeding ulcers in antrum; biopsy reactive gastropathy  Colonoscopy 3/2010: normal  Impression: Her upper GI symptoms have resolved. She will complete 6 months of PPI for erosive gastritis. She has been having worsening constipation and lower abdominal pain for the past several months despite several OTC medications. She reports chronic IBS-C, worse since starting Aimovig. Will try Linzess 290mcg, given samples. Current Plans  Started Linzess 290MCG, 1 (one) Capsule daily 30 minutes before breakfast, #90, 90 days starting 06/03/2019, Ref. Q 90 Days x1 Year. Pt Education - How to access health information online: discussed with patient and provided information. Patient is to call me for any questions or concerns.   Follow up in 4-6 weeks  This patient was reviewed and seen in collaboration with Dr. Bartolome Sotelo. Date of Surgery Update:  Darren Brock was seen and examined. History and physical has been reviewed. The patient has been examined.  There have been no significant clinical changes since the completion of the originally dated History and Physical.    Signed By: Bartolome Sotelo MD     July 30, 2019 10:47 AM

## 2019-07-30 NOTE — PROCEDURES
[address]  [address] [facility], [facility] (EGD) Procedure Note    Ricarda Carrillo  1980  [id number]      Procedure: Endoscopic Gastroduodenoscopy with esophageal dilation    Indication:  Dysphagia/odynophagia     Pre-operative Diagnosis: see indication above    Post-operative Diagnosis: see findings below    : Ramin Freeman MD    Surgical Assistant: None    Implants:  None    Referring Provider:  Lorena Caldwell MD      Anesthesia/Sedation:  MAC anesthesia Propofol        Procedure Details     After infomed consent was obtained for the procedure, with all risks and benefits of procedure explained the patient was taken to the endoscopy suite and placed in the left lateral decubitus position. Following sequential administration of sedation as per above, the endoscope was inserted into the mouth and advanced under direct vision to third portion of the duodenum. A careful inspection was made as the gastroscope was withdrawn, including a retroflexed view of the proximal stomach; findings and interventions are described below. Findings:   Esophagus:hiatal hernia 3 cm in size   schatzki ring dilated with CRE balloon ( 18 to 20 mm) kept at 20 mm for 1 minute  Stomach: normal   Duodenum/jejunum: normal      Therapies:  As above    Specimens: none         EBL: None      Complications:   None; patient tolerated the procedure well. Impression:    -See post-procedure diagnoses. Recommendations:  -Continue acid suppression.   -will consider esophageal manometry if not better  -colonoscopy today    Signed By: Ramin Freeman MD     7/30/2019  11:23 AM

## 2019-07-30 NOTE — ANESTHESIA PREPROCEDURE EVALUATION
Relevant Problems   No relevant active problems       Anesthetic History     PONV          Review of Systems / Medical History  Patient summary reviewed, nursing notes reviewed and pertinent labs reviewed    Pulmonary            Asthma        Neuro/Psych         Psychiatric history     Cardiovascular                  Exercise tolerance: >4 METS     GI/Hepatic/Renal     GERD      PUD     Endo/Other        Arthritis     Other Findings              Physical Exam    Airway  Mallampati: I  TM Distance: > 6 cm  Neck ROM: normal range of motion   Mouth opening: Normal     Cardiovascular    Rhythm: regular  Rate: normal         Dental  No notable dental hx       Pulmonary  Breath sounds clear to auscultation               Abdominal         Other Findings            Anesthetic Plan    ASA: 2  Anesthesia type: MAC          Induction: Intravenous  Anesthetic plan and risks discussed with: Patient

## 2019-09-05 ENCOUNTER — OFFICE VISIT (OUTPATIENT)
Dept: FAMILY MEDICINE CLINIC | Age: 39
End: 2019-09-05

## 2019-09-05 VITALS
WEIGHT: 167 LBS | RESPIRATION RATE: 18 BRPM | HEART RATE: 89 BPM | SYSTOLIC BLOOD PRESSURE: 129 MMHG | HEIGHT: 63 IN | DIASTOLIC BLOOD PRESSURE: 84 MMHG | TEMPERATURE: 97.6 F | OXYGEN SATURATION: 97 % | BODY MASS INDEX: 29.59 KG/M2

## 2019-09-05 DIAGNOSIS — M54.2 NECK PAIN: Primary | ICD-10-CM

## 2019-09-05 DIAGNOSIS — M25.511 ACUTE PAIN OF RIGHT SHOULDER: ICD-10-CM

## 2019-09-05 RX ORDER — CYCLOBENZAPRINE HCL 10 MG
10 TABLET ORAL
Qty: 12 TAB | Refills: 0 | Status: SHIPPED | OUTPATIENT
Start: 2019-09-05 | End: 2020-06-08 | Stop reason: ALTCHOICE

## 2019-09-05 RX ORDER — METHYLPREDNISOLONE 4 MG/1
TABLET ORAL
Qty: 1 DOSE PACK | Refills: 0 | Status: SHIPPED | OUTPATIENT
Start: 2019-09-05 | End: 2020-06-08 | Stop reason: ALTCHOICE

## 2019-09-05 NOTE — PROGRESS NOTES
HISTORY OF PRESENT ILLNESS  Master Fernandez is a 41 y.o. female. Patient reports ongoing right-sided neck pain that radiates to right shoulder x 4 days. No known injury or cause of the pain. She did not wake up with the pain, but it seemed to worse as the work day progressed. She has taken 2 aleve BID with no relief. She feels relief with salon pas, but it doesn't last long. She has tried heat and ice with no improvement. She has had a similar issue in the past and had to do PT. No numbness or weakness reported. Visit Vitals  /84 (BP 1 Location: Right arm, BP Patient Position: Sitting)   Pulse 89   Temp 97.6 °F (36.4 °C) (Oral)   Resp 18   Ht 5' 3\" (1.6 m)   Wt 167 lb (75.8 kg)   LMP 08/19/2019   SpO2 97%   BMI 29.58 kg/m²       HPI    Review of Systems   Musculoskeletal: Positive for neck pain. Physical Exam   Musculoskeletal:        Cervical back: She exhibits decreased range of motion (decreased ability to hyperextend due to pain), tenderness (right sternocleidomastoid and trapezius; radiates to shoulder; able to perform full range of motion of shoulder with mild pain; no weakness noted to neck or shoulder) and pain. She exhibits no bony tenderness and no swelling. ASSESSMENT and PLAN    ICD-10-CM ICD-9-CM    1. Neck pain M54.2 723.1    2.  Acute pain of right shoulder M25.511 719.41      Orders Placed This Encounter    methylPREDNISolone (MEDROL DOSEPACK) 4 mg tablet    cyclobenzaprine (FLEXERIL) 10 mg tablet   ice application x 20 minutes every 3-4 hours  Flexeril and medrol dose pack  Consider PT if no improvement  Consider massage therapy

## 2019-09-05 NOTE — PROGRESS NOTES
Dulce Conrad is a 41 y.o. female  HIPAA verified by two patient identifiers. Chief Complaint   Patient presents with    Shoulder Pain     6/10, four days    Neck Pain      days     Visit Vitals  /84 (BP 1 Location: Right arm, BP Patient Position: Sitting)   Pulse 89   Temp 97.6 °F (36.4 °C) (Oral)   Resp 18   Ht 5' 3\" (1.6 m)   Wt 167 lb (75.8 kg)   LMP 08/19/2019   SpO2 97%   BMI 29.58 kg/m²       Pain Scale: 6/10  Pain Location: Shoulder (right and neck)  1. Have you been to the ER, urgent care clinic since your last visit? Hospitalized since your last visit? No    2. Have you seen or consulted any other health care providers outside of the [address] [place] [address] since your last visit? Include any pap smears or colon screening.  No

## 2019-09-05 NOTE — PATIENT INSTRUCTIONS
Neck: Exercises  Introduction  Here are some examples of exercises for you to try. The exercises may be suggested for a condition or for rehabilitation. Start each exercise slowly. Ease off the exercises if you start to have pain. You will be told when to start these exercises and which ones will work best for you. How to do the exercises  Neck stretch    1. This stretch works best if you keep your shoulder down as you lean away from it. To help you remember to do this, start by relaxing your shoulders and lightly holding on to your thighs or your chair. 2. Tilt your head toward your shoulder and hold for 15 to 30 seconds. Let the weight of your head stretch your muscles. 3. If you would like a little added stretch, use your hand to gently and steadily pull your head toward your shoulder. For example, keeping your right shoulder down, lean your head to the left. 4. Repeat 2 to 4 times toward each shoulder. Diagonal neck stretch    1. Turn your head slightly toward the direction you will be stretching, and tilt your head diagonally toward your chest and hold for 15 to 30 seconds. 2. If you would like a little added stretch, use your hand to gently and steadily pull your head forward on the diagonal.  3. Repeat 2 to 4 times toward each side. Dorsal glide stretch    1. Sit or stand tall and look straight ahead. 2. Slowly tuck your chin as you glide your head backward over your body  3. Hold for a count of 6, and then relax for up to 10 seconds. 4. Repeat 8 to 12 times. Chest and shoulder stretch    1. Sit or stand tall and glide your head backward as in the dorsal glide stretch. 2. Raise both arms so that your hands are next to your ears. 3. Take a deep breath, and as you breathe out, lower your elbows down and behind your back. You will feel your shoulder blades slide down and together, and at the same time you will feel a stretch across your chest and the front of your shoulders.   4. Hold for about 6 seconds, and then relax for up to 10 seconds. 5. Repeat 8 to 12 times. Strengthening: Hands on head    1. Move your head backward, forward, and side to side against gentle pressure from your hands, holding each position for about 6 seconds. 2. Repeat 8 to 12 times. Follow-up care is a key part of your treatment and safety. Be sure to make and go to all appointments, and call your doctor if you are having problems. It's also a good idea to know your test results and keep a list of the medicines you take. Where can you learn more? Go to http://dorita-nara.info/. Enter P975 in the search box to learn more about \"Neck: Exercises. \"  Current as of: September 20, 2018  Content Version: 12.1  © 8783-1355 [employer]wise, [employer]. Care instructions adapted under license by [employer] (which disclaims liability or warranty for this information). If you have questions about a medical condition or this instruction, always ask your healthcare professional. [address] any warranty or liability for your use of this information.

## 2020-06-05 ENCOUNTER — TELEPHONE (OUTPATIENT)
Dept: FAMILY MEDICINE CLINIC | Age: 40
End: 2020-06-05

## 2020-06-05 NOTE — TELEPHONE ENCOUNTER
----- Message from Neftali Chambers sent at 6/5/2020 10:25 AM EDT -----  Regarding: Dr Babb Grounds: [phone]  General Message/Vendor Calls    Caller's first and last name:[facility]      Reason for call:pt wants to discuss headaches and blood pressure being 145/90 for her June19,2020 at 2:20 pm New patient appt . However,If something comes available sooner for New patient appt please contact pt.     Callback required yes/no and why:yes      Best contact number(s):[phone]      Details to clarify the request:      Neftali Chambers

## 2020-06-08 ENCOUNTER — VIRTUAL VISIT (OUTPATIENT)
Dept: FAMILY MEDICINE CLINIC | Age: 40
End: 2020-06-08

## 2020-06-08 DIAGNOSIS — G44.219 EPISODIC TENSION-TYPE HEADACHE, NOT INTRACTABLE: Primary | ICD-10-CM

## 2020-06-08 DIAGNOSIS — R03.0 ELEVATED BP WITHOUT DIAGNOSIS OF HYPERTENSION: ICD-10-CM

## 2020-06-08 DIAGNOSIS — M79.18 MYOFASCIAL PAIN: ICD-10-CM

## 2020-06-08 NOTE — PROGRESS NOTES
Chief Complaint   Patient presents with   [facility] Maintenance reviewed     1. Have you been to the ER, urgent care clinic since your last visit? Hospitalized since your last visit? No     2. Have you seen or consulted any other health care providers outside of the [address] [place] [address] since your last visit? Include any pap smears or colon screening.   No

## 2020-06-08 NOTE — PROGRESS NOTES
THIS VISIT WAS COMPLETED VIRTUALLY USING DOXY. MAR NICOLE  Merlyn Bartholomew is a 46 y.o. female who presents with a concern about her blood pressure and headaches. She was worried that her blood pressure could be causing her headaches. Has been taking blood pressure at home and it is higher than she is used to but the max that she saw was 135/95. This was a concern for her because she had had hypertension in the past and required medication but she went on the keto diet lost 20 pounds and was able to stop her blood pressure medication. She is motivated to not be on blood pressure medication because she was getting side effects. The headaches are occipital emanating from the bilateral C1 area and the superior trapezius. When she feels along these areas she notes that she has knots in the trapezius. When she flexes her head forward she reproduces the headache symptoms    PMHx:  Past Medical History:   Diagnosis Date    Arrhythmia 2008 and 2011    Ablation for SVT and WPW    Arrhythmia atrial     A-Fib    Arthritis     Asthma     Chronic pain     fang danlos    Depression     Fang-Danlos disease     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     Unspecified adverse effect of anesthesia     with C- section the spinal did not take and had to be put to sleep    Palacios-Parkinson-White syndrome     Marie-Parkinson-White syndrome        Meds:   Current Outpatient Medications   Medication Sig Dispense Refill    pantoprazole (PROTONIX) 40 mg tablet Take 40 mg by mouth daily.  rizatriptan (MAXALT) 10 mg tablet TAKE 1 TABLET BY MOUTH STAT, MAY REPEAT IN 2 HOURS IF NEEDED. MAX: 2 TABS DAILY 9 Tab 2       Allergies: Allergies   Allergen Reactions    Lisinopril Swelling    Morphine Other (comments)     Chest pain       Smoker:  Social History     Tobacco Use   Smoking Status Never Smoker   Smokeless Tobacco Never Used       ETOH:   Social History     Substance and Sexual Activity   Alcohol Use Yes    Alcohol/week: 0.8 - 1.7 standard drinks    Types: 1 - 2 Glasses of wine per week    Comment: 1       FH:   Family History   Problem Relation Age of Onset    Cancer Mother 40        ovarian   [facility] Migraines Mother     Heart Disease Father     Migraines Father     Cancer Maternal Grandfather         lung    Psychiatric Disorder Maternal Grandmother     Parkinson's Disease Paternal Grandfather     Migraines Sister     Cancer Paternal Grandmother         breast cancer    Asthma Child     Eczema Child        ROS:   As listed in HPI. In addition:  Constitutional:   No headache, fever, fatigue, weight loss or weight gain      Cardiac:    No chest pain      Resp:   No cough or shortness of breath      Neuro   No loss of consciousness, dizziness, seizures      Physical Exam:  There were no vitals taken for this visit. GEN: No apparent distress. Alert and oriented and responds to all questions appropriately. NEUROLOGIC:  No focal neurologic deficits. Coordination and gait grossly intact. EXT: Well perfused. No edema. SKIN: No obvious rashes. Due to this being a TeleHealth evaluation, many elements of the physical examination are unable to be assessed.         Assessment and Plan     Tension headache  Neck muscle pain generator  Judicious use of NSAIDs  Walked her through stretches and exercises    Blood pressure concern  Not hypertensive just higher than she is used to.  She does not want to end up back on medication. Encouraged exercise diet weight loss     previous PCP [place]. Last blood work was done at [facility] so do not have access to that but she recalls that her cholesterol was a little high but that was it      ICD-10-CM ICD-9-CM    1. Episodic tension-type headache, not intractable G44.219 339.11    2. Myofascial pain M79.18 729.1    3. Elevated BP without diagnosis of hypertension R03.0 796.2          Pursuant to the emergency declaration under the [facility], [facility] waiver authority and the [employer] Act, this Virtual  Visit was conducted, with patient's consent, to reduce the patient's risk of exposure to COVID-19 and provide continuity of care for an established patient. Services were provided through a video synchronous discussion virtually to substitute for in-person clinic visit.

## 2020-07-13 RX ORDER — METHOCARBAMOL 500 MG/1
500 TABLET, FILM COATED ORAL
Qty: 90 TAB | Refills: 2 | Status: SHIPPED | OUTPATIENT
Start: 2020-07-13

## 2020-07-23 ENCOUNTER — OFFICE VISIT (OUTPATIENT)
Dept: NEUROLOGY | Age: 40
End: 2020-07-23

## 2020-07-23 VITALS
DIASTOLIC BLOOD PRESSURE: 78 MMHG | BODY MASS INDEX: 29.23 KG/M2 | HEIGHT: 63 IN | OXYGEN SATURATION: 98 % | SYSTOLIC BLOOD PRESSURE: 112 MMHG | WEIGHT: 165 LBS | HEART RATE: 69 BPM | RESPIRATION RATE: 16 BRPM

## 2020-07-23 DIAGNOSIS — H02.401 PTOSIS OF RIGHT EYELID: ICD-10-CM

## 2020-07-23 RX ORDER — FREMANEZUMAB-VFRM 225 MG/1.5ML
225 INJECTION SUBCUTANEOUS
Qty: 1 DOSE | Refills: 3 | Status: SHIPPED | OUTPATIENT
Start: 2020-07-23 | End: 2020-09-01 | Stop reason: ALTCHOICE

## 2020-07-23 RX ORDER — FREMANEZUMAB-VFRM 225 MG/1.5ML
225 INJECTION SUBCUTANEOUS ONCE
Qty: 1 DOSE | Refills: 0 | Status: SHIPPED | COMMUNITY
Start: 2020-07-23 | End: 2020-07-23

## 2020-07-23 NOTE — PROGRESS NOTES
patient states her migraines are getting worse. she is having them more often and is experiencing some facial drooping. she is taking ritzatriptan and robaxin for her migraines. she states she thinks the ritzatriptan is working but the robaxin is not really helping. she is taking motrin over the counter as well but that also is not helping. .  Chief Complaint   Patient presents with    Follow-up    Migraine     patient states her migraines are getting worse. she is having them more often and is experiencing some facial drooping. she is taking ritzatriptan and robaxin for her migraines. she states she thinks the ritzatriptan is working but the robaxin is not really helping. she is taking motrin over the counter as well but that also is not helping     . Bianca Saldana   Visit Vitals  /78 (BP 1 Location: Left arm, BP Patient Position: Sitting)   Pulse 69   Resp 16   Ht 5' 3\" (1.6 m)   Wt 165 lb (74.8 kg)   SpO2 98%   BMI 29.23 kg/m²

## 2020-07-23 NOTE — PROGRESS NOTES
Neurology Follow-up  JASE Mason      Visit Date: July 23, 2020    Patient: Maikel Rivera MRN: [id number]  SSN: [id number]    YOB: 1980  Age: 46 y.o. Sex: female      PCP: Kristin Caceres MD    Chief Complaint   Patient presents with    Follow-up    Migraine     patient states her migraines are getting worse. she is having them more often and is experiencing some facial drooping. she is taking ritzatriptan and robaxin for her migraines. she states she thinks the ritzatriptan is working but the robaxin is not really helping. she is taking motrin over the counter as well but that also is not helping       Previous records (physician notes, laboratory reports, and radiology reports) and imaging studies were reviewed and summarized. Subjective:     HPI: Maikel Rivera is a 46 y.o. female presenting for f/u of migraines. Headaches began in her teens with increasing frequency 2018. Migraines described as R hemisphere, R occipital/frontal or R retro-orbital, Throbbing, On average 7/10, 10-15/month, HA free days per month: 6-7, up to 3 days long with aura of white floaters, +nausea/vomiting, +photophobia. +Vision loss either eye with migraines. Luz-menstrual on occasion. FHx HA/migraine: Sister, Mother. She has failed TPM in the past due to side effects and is not a candidate for TCA therapy due to h/o cardiac arrhythmia (Afib/WPW). Started on Aimovig injections in Jan 2019. Rizatriptan, robaxin, for rescue which works well. F/u in March of 2019 she reported that her headaches are much improved - only 2 migraines monthly. However she discontinued Aimovig because it caused severe constipation. She has IBS and had to take Linzess to relieve the constipation, but that ended up causing diarrhea.     Last seen March 2019, following up today 7/23/20. Patient reports she is getting migraines 3 times a week.   Migraines similar to the description above.  She reports that she did have ptosis with migraines before, but that it is getting worse. She also has associated facial numbness on the right. She has a nodule or a muscle knot just lateral to the spinous process at about C3-C4 on the right side of her neck. She reports this is the source of her headaches. Things always seem to spread from there, she reports. She says she has told lots of doctors but that it has been dismissed. No significant sleep issues. Has a migraine today. Took rizatriptan 2 hours prior to the visit. Headache resolving but still has facial numbness and right ptosis. She has Robaxin prescribed as well for muscle spasm but says it does not help with headaches and is not really taking it. Treatment so far: Rizatriptan  Previously: Fioricet. TPM-non effective, body aches. Verapamil for BP in the past.  Sertraline for depression in the past.    She reports headaches are much improved- only 2 migraines monthly presently.   She is using Maxalt PRN for rescue tx which works well.               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  Unspecified adverse effect of anesthesia     with C- section the spinal did not take and had to be put to sleep    Palacios-Parkinson-White syndrome     Marie-Parkinson-White syndrome      Past Surgical History:   Procedure Laterality Date     DELIVERY ONLY      COLONOSCOPY N/A 2019    COL[facility] ENDOSCOPY     [address]      c section x 2    HX CHOLECYSTECTOMY      HX HEENT  As Child    \"tubes in each ear\" as child    HX SVT ABLATION      ,     HX TONSILLECTOMY  2007    tonsils      Family History   Problem Relation Age of Onset   [facility] Cancer Mother 40        ovarian   [facility] Migraines Mother     Heart Disease Father     Migraines Father     Cancer Maternal Grandfather         lung    Psychiatric Disorder Maternal Grandmother     Parkinson's Disease Paternal Grandfather     Migraines Sister     Cancer Paternal Grandmother         breast cancer    Asthma Child     Eczema Child      Social History     Tobacco Use    Smoking status: Never Smoker    Smokeless tobacco: Never Used   Substance Use Topics    Alcohol use: Yes     Alcohol/week: 0.8 - 1.7 standard drinks     Types: 1 - 2 Glasses of wine per week     Comment: 1      Current Outpatient Medications   Medication Sig Dispense Refill    fremanezumab-vfrm (Ajovy Autoinjector) 225 mg/1.5 mL atIn 225 mg by SubCUTAneous route once for 1 dose. Indications: migraine prevention 1 Dose 0    fremanezumab-vfrm (Ajovy Autoinjector) 225 mg/1.5 mL atIn 225 mg by SubCUTAneous route every thirty (30) days. 1 Dose 3    methocarbamoL (ROBAXIN) 500 mg tablet Take 1 Tab by mouth three (3) times daily as needed for Muscle Spasm(s). 90 Tab 2    rizatriptan (MAXALT) 10 mg tablet TAKE 1 TABLET BY MOUTH STAT, MAY REPEAT IN 2 HOURS IF NEEDED. MAX: 2 TABS DAILY 9 Tab 2    pantoprazole (PROTONIX) 40 mg tablet Take 40 mg by mouth daily.           Allergies   Allergen Reactions    Lisinopril Swelling    Morphine Other (comments)     Chest pain          Objective:      Visit Vitals  /78 (BP 1 Location: Left arm, BP Patient Position: Sitting)   Pulse 69   Resp 16   Ht 5' 3\" (1.6 m)   Wt 74.8 kg (165 lb)   SpO2 98%   BMI 29.23 kg/m²        General: No distress. Well groomed  Heart: Regular rate and rhythm. Lungs: Unlabored  Musculoskeletal: Extremities revealed no edema or muscle wasting   Skin: Warm dry skin  Psych: Good mood and affect     Neurologic Exam:     Mental Status:  Alert and oriented. Attentive. Calm. Cooperative. Normal processing on general observation  Coherent conversation and responded appropriately throughout the office visit  Able to follow directions without difficulty      Language:                   Normal fluency, comprehension. Cranial Nerves:           Pupils equal, round and reactive to light. Visual fields normal in all quadrants in both eyes. Extraocular movements intact w/o nystagmus  Facial sensation diminished on the right side, no extinction. Right ptosis, otherwise face intact  Hearing intact bilaterally. No dysarthria. Tongue protrudes to midline. Shoulder shrug 5/5 bilaterally. Motor:                          Bulk and tone normal.                                       5/5 strength in all extremities. No pronator drift. No involuntary movements, resting or intention tremor        Sensory exam:            Normal throughout to light touch. Coordination:  Finger to nose normal.     Gait and Station:         Normal gait. No Rhomberg        Reflexes:                     DTRs 2+ throughout. MMSE  No flowsheet data found.      PHQ  3 most recent PHQ Screens 7/23/2020   PHQ Not Done -   Little interest or pleasure in doing things Not at all   Feeling down, depressed, irritable, or hopeless Not at all   Total Score PHQ 2 0       Lab Results   Component Value Date/Time    WBC 6.3 06/03/2017 10:24 PM    HCT 36.9 06/03/2017 10:24 PM    HGB 12.1 06/03/2017 10:24 PM    PLATELET 091 22/63/0988 10:24 PM       Lab Results   Component Value Date/Time    Sodium 139 06/03/2017 10:24 PM    Potassium 3.0 (L) 06/03/2017 10:24 PM    Chloride 104 06/03/2017 10:24 PM    CO2 26 06/03/2017 10:24 PM    Glucose 112 (H) 06/03/2017 10:24 PM    BUN 13 06/03/2017 10:24 PM    Creatinine 0.83 06/03/2017 10:24 PM    Calcium 8.8 06/03/2017 10:24 PM       No components found for: TROPQUANT,  SGOT,  GPT,  ALT,  AP,  TBIL,  TBILI,  TP,  ALB,  GLOB,  GGT,  AML,  AMYP,  LPSE,  HLPSE    No results found for: MAKAYLA    No results found for: HBA1C, HGBE8, RED5JPDW, ZDQ3MTJK, GBH4CIEL     No results found for: B12LT, FOL, RBCF    No results found for: MAKAYLA, Edwinna Barks, XBANA    Lab Results   Component Value Date/Time    Cholesterol, total 219 (H) 09/02/2011 09:13 PM    HDL Cholesterol 67 09/02/2011 09:13 PM    LDL, calculated 121.2 (H) 09/02/2011 09:13 PM    VLDL, calculated 30.8 09/02/2011 09:13 PM    Triglyceride 154 (H) 09/02/2011 09:13 PM    CHOL/HDL Ratio 3.3 09/02/2011 09:13 PM       XR Results   Results from Abstract encounter on 03/24/20   XR SINUSES PARANASAL MIN 3 V   Results from Hospital Encounter encounter on 03/14/19   XR CHEST PA LAT    Impression Impression: No acute process. Normal chest radiograph     Results from Hospital Encounter encounter on 06/03/17   XR CHEST PORT    Impression IMPRESSION: Normal chest.         CT Results:  Results from Hospital Encounter encounter on 06/03/17   CTA CHEST W OR W WO CONT    Narrative EXAM: CT ANGIOGRAPHY CHEST   INDICATION: Chest pain with nausea and migraine headache. COMPARISON: None. CONTRAST: 70 mL of Isovue-370. TECHNIQUE:   Precontrast  images were obtained to localize the volume for acquisition.   Multislice helical CT arteriography was performed from the diaphragm to the  thoracic inlet during uneventful rapid bolus intravenous contrast  administration. Lung and soft tissue windows were generated. Coronal and  sagittal  reformatted images were also generated and 3D post processing  consisting of coronal maximum intensity projection images was performed. CT dose  reduction was achieved through use of a standardized protocol tailored for this  examination and automatic exposure control for dose modulation. FINDINGS:  LOWER NECK: The visualized portions of the thyroid and structures of the lower  neck are within normal limits. LUNGS: The lungs are clear of mass, nodule, airspace disease or edema. PLEURA: There is no pleural effusion or pneumothorax. PULMONARY ARTERIES: The pulmonary arteries are well enhanced and no pulmonary  emboli are identified. AORTA: The aorta enhances normally without evidence of aneurysm or dissection. MEDIASTINUM: There is no mediastinal or hilar adenopathy or mass. BONES AND SOFT TISSUES: The bones and soft tissues of the chest wall are within  normal limits. UPPER ABDOMEN: The visualized portions of the upper abdominal organs are normal.      Impression IMPRESSION: Normal CT arteriogram of the chest. No pulmonary embolus. Results from [place] encounter on 03/17/14   CT ABD PELV W CONT    Narrative **Final Report**       ICD Codes / Adm. Diagnosis: 789.00  780.79 / Abdominal pain, unspecified si    Examination:  CT ABD PELVIS W CON  - [id number] - Mar 17 2014  2:32PM  Accession No:  [id number]  Reason:  abdominal pain      REPORT:  INDICATION:  Abdominal pain, unspecified si  abdominal pain    EXAM: CT Abdomen and CT Pelvis are performed with 100 mL Optiray-350   contrast.    FINDINGS:  There is no focal inflammation, ascites, free air or significant adenopathy   in the abdomen or pelvis. Liver is uniform in texture. Gallbladder is   absent. Bile ducts and spleen are not enlarged. Pancreas shows no mass or   inflammation. Adrenal glands are normal in size.  Kidneys show no mass or hydronephrosis. Aorta shows no calcification or aneurysm. Bowel loops are nondilated and show no mural thickening or inflammation. Appendix is not enlarged. In the pelvis the bladder is midline and the distal ureters are not dilated. Adnexa appear unremarkable by CT. There is no free fluid. IMPRESSION: Unremarkable CT Abdomen Pelvis. No change since 4/22/2011. Signing/Reading Doctor: Tobi Luciano ([id number])    Approved: Tobi Luciano ([id number])  Mar 17 2014  2:41PM                               Results from Hospital Encounter encounter on 04/22/11   CT ABDOMEN PELVIS ENTEROGRAPHY    Narrative **Final Report**       ICD Codes / Adm. Diagnosis: 789.00  787.91 / ABDOMINAL PAIN  DIARRHEA  Examination:  CT ABD PELVIS ENTEROGRAPHY  - [id number] - Apr 22 2011  9:10AM  Accession No:  [id number]  Reason:  diarrhea      REPORT:  CLINICAL HISTORY: Abdominal pain, diarrhea. Axial images were obtained from the dome of the diaphragm to the iliac   crests following the administration of enteroview. Axial images were then   obtained from the dome of the diaphragm to the pubic symphysis following the   uneventful administration 100 mL Optiray 350. Delayed images were obtained   through the pelvis. Coronal reconstructed images were performed. The lung   bases are clear. The liver, spleen, kidneys, pancreas, adrenal glands are   normal in appearance. The gallbladder is surgically absent. The bowel is   unremarkable. There is no free fluid. There is no pelvic mass or   lymphadenopathy. IMPRESSION: Normal CT enterography. Interpreting/Reading Doctor: Merline Ends ([id number])  Transcribed:  on 04/22/2011  Approved: Merline Ends ([id number])  04/22/2011             Distribution:  Attending Doctor: Maddy Villegas               MRI Results:  Results from Hospital Encounter encounter on 05/04/18   MRI KNEE LT WO CONT    Narrative EXAM:  MRI KNEE LT WO CONT    INDICATION: Left knee pain    COMPARISON: None    TECHNIQUE: Axial T2 fat-saturated and proton density fat-saturated; coronal T1  and proton density fat-saturated; and sagittal T2 fat-saturated, proton density  fat-saturated, and gradient echo MRI of the left knee . CONTRAST:  None. FINDINGS: Bone marrow: Within normal limits. No acute fracture, dislocation, or  marrow replacing process. Joint fluid: None. Collateral ligaments and posterior, lateral corner: Intact. Medial meniscus: Intact. Lateral meniscus: Intact. ACL and PCL: Intact. Tendons: Intact. Muscles: Within normal limits. Patellofemoral alignment: There is mild lateral tilting and subluxation of the  patella. Trochlear groove is not hypoplastic. TT-TG distance: 12 mm    Articular cartilage: Intact. No focal osteochondral lesion. Soft tissue mass: None. Impression IMPRESSION:  No evidence of internal derangement of the knee     Results from Hospital Encounter encounter on 03/03/17   MRI HIP LT WO CONT    Narrative EXAM:  MRI HIP LT WO CONT    INDICATION: Pain in left hip buttock and groin. Clicking hip. History of Fang  Danlos    COMPARISON: None    TECHNIQUE: Coronal T1 and axial T2 fat-saturated MRI of the whole pelvis; axial  and sagittal T2 fat-saturated; coronal proton density fat-saturated MRI of the  left hip . CONTRAST: None. FINDINGS: Bone marrow: Within normal limits. No acute fracture, dislocation, or  marrow replacing process. Joint fluid: None. Articular cartilage: Intact. Acetabular labrum: There is a thin partial-thickness cleft in the superior to  superior posterior chondral labral junction with smooth borders. Hip morphology:  Normal concavity of the femoral head-neck junction. No  acetabular overcoverage or retroversion. Tendons: Mild edema surrounds the distal left gluteus minimus tendon. Trace  fluid is present in the left trochanteric bursa. Muscles: Within normal limits.      Soft tissue mass: None. Intrapelvic soft tissues: no acute process. Impression IMPRESSION:   1. Mild tendinitis of the distal left gluteus minimus tendon with trace fluid in  the left trochanteric bursa. 2. Thin partial-thickness cleft in the superior to superior posterior  chondrolabral junction which may represent a congenital sulcus versus incomplete  tear. Results from [place] encounter on 07/31/14   MRI ANKLE RT WO CONT    Narrative **Final Report**       ICD Codes / Adm. Diagnosis: 844.8  719.46 / Sprain and strain of other spe    Examination:  MR ANKLE MRI WO CON RT  - [id number] - Jul 31 2014  8:14AM  Accession No:  [id number]  Reason:  Sprain and strain of other specified sites of knee and leg      REPORT:  INDICATION: Right ankle pain and stiffness, peroneal tendon tear, 844.8 . COMPARISON: None    EXAM: Sagittal T1-weighted spin-echo and fat-suppressed T2-weighted fast   spin-echo, axial fat-suppressed proton density weighted and T2 weighted fast   spin echo, sagittal fat-suppressed 3D gradient-echo and coronal   fat-suppressed T2 weighted fast spin-echo images of the right ankle are   obtained. FINDINGS: Alignment is anatomic. Moderate ankle and small posterior subtalar   joint effusions are shown with ill-defined anterior ankle joint filling   defects suggesting synovitis. There is mild nonspecific edema-like signal   posterolaterally in the talar dome and posteriorly in the medial malleolus   with otherwise normal bone signal. No chondral or osteochondral derangement   is revealed. A tiny subchondral cyst is shown anteriorly in the talar dome. There is thickening and heterogeneous signal of the anterior talofibular and   calcaneofibular ligaments consistent with sprains. There is a small effusion of the peroneal and posterior tibialis tendon   sheaths although the tendons themselves appear within normal limits.  The   other ankle tendons appear normal.    There is no bone or soft tissue mass. No tarsal coalition is demonstrated. IMPRESSION: Sprains of calcaneofibular and anterior talofibular ligaments   with edema posteriorly in medial malleolus and posterolaterally in talar   dome. Mild peroneal and posterior tibialis tenosynovitis. Signing/Reading Doctor: Jj García. Tete Goodman ([id number])    Approved: FELY Goodman ([id number])  Jul 31 2014  8:42AM                                    VAS/US Results (maximum last 3): No results found for this or any previous visit. TTE         EKG  Results for orders placed or performed during the hospital encounter of 06/03/17   EKG, 12 LEAD, INITIAL   Result Value Ref Range    Ventricular Rate 80 BPM    Atrial Rate 80 BPM    P-R Interval 162 ms    QRS Duration 80 ms    Q-T Interval 368 ms    QTC Calculation (Bezet) 424 ms    Calculated P Axis 57 degrees    Calculated R Axis 18 degrees    Calculated T Axis 16 degrees    Diagnosis       Normal sinus rhythm  Normal ECG  When compared with ECG of 18-NOV-2016 14:22,  No significant change was found  Confirmed by Colton Manuel ([id number]) on 6/4/2017 11:48:49 AM         Follow-up and Dispositions    · Return in about 1 month (around 8/23/2020). Assessment/Plan:   Shameka Avitia is a 46 y.o. female with a long history of migraines. She has failed TPM in the past due to side effects and is not a candidate for TCA therapy due to h/o cardiac arrhythmia (Afib/WPW). Started on Aimovig injections in Jan 2019 which she took for 3 months. It worked really well for her but caused severe constipation she ultimately stopped taking it. Rizatriptan for rescue works well. She is not currently on a preventative. She is currently having 3 migraines a week with associated right ptosis and facial numbness on the right. She has a knot of some sort just to the right of the spinal process around C3-C4 that she reports has been there as long as she can remember and is the source of her migraines.   Everything starts there she reports.    - MRI brain without rule out abnormality that may be causing migraines. - MRI neck evaluate this nodule in the paraspinal musculature at around the level of C3-C4 on the right side  - Plan based on MRI results. - Gave a sample of Ajovy to start now and also prescribed it. Hopefully will get the same migraine results without the constipation.  - Continue rizatriptan for rescue  - Follow up in 1 months, sooner if needed. - Instructed to call with questions or concerns. Visit Diagnoses    ICD-10-CM ICD-9-CM    1. Chronic migraine  G43.709 346.70 MRI CERV SPINE WO CONT      fremanezumab-vfrm (Ajovy Autoinjector) 225 mg/1.5 mL atIn      fremanezumab-vfrm (Ajovy Autoinjector) 225 mg/1.5 mL atIn      MRI BRAIN WO CONT      CANCELED: MRI BRAIN WO CONT   2. Ptosis of right eyelid  H02.401 374.30 MRI BRAIN WO CONT       If fails Ajovy due to constipation can consider steroid injections to the knot site on her neck depending on what it is. Can also consider Botox for migraine prevention.    - This note will not be viewable in [facility].        Signed By: Abby Castellon NP     July 23, 2020 2:35 PM

## 2020-08-03 ENCOUNTER — TELEPHONE (OUTPATIENT)
Dept: NEUROLOGY | Age: 40
End: 2020-08-03

## 2020-08-03 NOTE — TELEPHONE ENCOUNTER
Re: Jerzy Gomez denied     Denial rec'd from [employer] via [address]    Per Plan:   Patient must have a trial of both Emgality and Aimovig . A trial of Aimovig is noted. Patient does have a commercial health plan and would be eligible to utilize co-pay savings card if deemed appropriate by provider.

## 2020-08-04 ENCOUNTER — EMPLOYEE WELLNESS (OUTPATIENT)
Dept: FAMILY MEDICINE CLINIC | Age: 40
End: 2020-08-04

## 2020-08-04 LAB
CHOLEST SERPL-MCNC: 253 MG/DL
GLUCOSE SERPL-MCNC: 85 MG/DL (ref 65–100)
HDLC SERPL-MCNC: 78 MG/DL
LDLC SERPL CALC-MCNC: 160.6 MG/DL (ref 0–100)
TRIGL SERPL-MCNC: 72 MG/DL (ref ?–150)

## 2020-08-05 ENCOUNTER — HOSPITAL ENCOUNTER (OUTPATIENT)
Dept: MRI IMAGING | Age: 40
Discharge: HOME | End: 2020-08-05
Payer: COMMERCIAL

## 2020-08-05 DIAGNOSIS — H02.401 PTOSIS OF RIGHT EYELID: ICD-10-CM

## 2020-08-05 PROCEDURE — 70551 MRI BRAIN STEM W/O DYE: CPT | Performed by: NURSE PRACTITIONER

## 2020-08-05 PROCEDURE — 72141 MRI NECK SPINE W/O DYE: CPT | Performed by: NURSE PRACTITIONER

## 2020-08-05 NOTE — TELEPHONE ENCOUNTER
Called the patient to let her know we are out of discount cards for emgality and aimovig. The patient will go online and print it off.

## 2020-08-31 ENCOUNTER — TELEPHONE (OUTPATIENT)
Dept: FAMILY MEDICINE CLINIC | Age: 40
End: 2020-08-31

## 2020-08-31 NOTE — TELEPHONE ENCOUNTER
Patient says something popped in neck, now neck is stiff and painful, unable to move neck at all.  Patient can be reached at [phone]

## 2020-09-01 ENCOUNTER — VIRTUAL VISIT (OUTPATIENT)
Dept: FAMILY MEDICINE CLINIC | Age: 40
End: 2020-09-01
Payer: COMMERCIAL

## 2020-09-01 ENCOUNTER — TELEPHONE (OUTPATIENT)
Dept: FAMILY MEDICINE CLINIC | Age: 40
End: 2020-09-01

## 2020-09-01 DIAGNOSIS — M62.838 NECK MUSCLE SPASM: Primary | ICD-10-CM

## 2020-09-01 PROCEDURE — 99213 OFFICE O/P EST LOW 20 MIN: CPT | Performed by: FAMILY MEDICINE

## 2020-09-01 RX ORDER — PREDNISONE 10 MG/1
TABLET ORAL
Qty: 21 TAB | Refills: 0 | Status: SHIPPED | OUTPATIENT
Start: 2020-09-01 | End: 2020-11-27

## 2020-09-01 RX ORDER — CYCLOBENZAPRINE HCL 10 MG
10 TABLET ORAL
Qty: 12 TAB | Refills: 0 | Status: SHIPPED | OUTPATIENT
Start: 2020-09-01 | End: 2020-11-27

## 2020-09-01 RX ORDER — IBUPROFEN 200 MG
TABLET ORAL
COMMUNITY
End: 2020-12-25

## 2020-09-01 NOTE — PROGRESS NOTES
Chief Complaint   Patient presents with    Back Pain    Neck Pain    Shoulder Pain     Patient was seen via virtual video visit. Patient reports that she has had upper back and neck pain times several days. Patient reports that she is needed assistance with moving from laying flat to sitting up and from sitting to standing. Patient reports that she has not been able to be at work due to pain and limited range of motion. Patient reports that she has had episodes of back pain in the past but, nothing quite like current episode. Cheri Callejas is a 46 y.o. female who was seen by synchronous (real-time) audio-video technology on 9/1/2020 for Back Pain; Neck Pain; and Shoulder Pain        Assessment & Plan:   Diagnoses and all orders for this visit:    1. Neck muscle spasm  -Advised patient to take medications as written, gentle stretching. Call back if no improvement in 2 days  -     cyclobenzaprine (FLEXERIL) 10 mg tablet; Take 1 Tab by mouth three (3) times daily as needed for Muscle Spasm(s). -     predniSONE (STERAPRED DS) 10 mg dose pack; See administration instruction per 10mg dose pack      Subjective:       Prior to Admission medications    Medication Sig Start Date End Date Taking? Authorizing Provider   ibuprofen (MOTRIN) 200 mg tablet Take  by mouth. Yes Provider, Historical   cyclobenzaprine (FLEXERIL) 10 mg tablet Take 1 Tab by mouth three (3) times daily as needed for Muscle Spasm(s). 9/1/20  Yes Ge Rehman MD   predniSONE (STERAPRED DS) 10 mg dose pack See administration instruction per 10mg dose pack 9/1/20  Yes Ge Rehman MD   methocarbamoL (ROBAXIN) 500 mg tablet Take 1 Tab by mouth three (3) times daily as needed for Muscle Spasm(s). 7/13/20  Yes Chuy Shrestha MD   pantoprazole (PROTONIX) 40 mg tablet Take 40 mg by mouth daily. Yes Provider, Historical   rizatriptan (MAXALT) 10 mg tablet TAKE 1 TABLET BY MOUTH STAT, MAY REPEAT IN 2 HOURS IF NEEDED. 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 HENT: Negative for congestion and sore throat. Respiratory: Negative for cough and shortness of breath. Cardiovascular: Negative for chest pain and palpitations. Gastrointestinal: Negative for abdominal pain, heartburn, nausea and vomiting. Genitourinary: Negative for dysuria and urgency. Musculoskeletal: Positive for back pain and myalgias. Negative for joint pain. Neurological: Negative for dizziness, tingling and headaches. All other systems reviewed and are negative.       Objective:     Patient-Reported Vitals 9/1/2020   Patient-Reported Weight -   Patient-Reported Height -   Patient-Reported Pulse 85   Patient-Reported Systolic  369   Patient-Reported Diastolic 96        [INSTRUCTIONS:  \"[x]\" Indicates a positive item  \"[]\" Indicates a negative item  -- DELETE ALL ITEMS NOT EXAMINED]    Constitutional: [x] Appears well-developed and well-nourished [x] No apparent distress      [] Abnormal -     Mental status: [x] Alert and awake  [x] Oriented to person/place/time [x] Able to follow commands    [] Abnormal -     Eyes:   EOM    [x]  Normal    [] Abnormal -   Sclera  [x]  Normal    [] Abnormal -          Discharge [x]  None visible   [] Abnormal -     HENT: [x] Normocephalic, atraumatic  [] Abnormal -   [x] Mouth/Throat: Mucous membranes are moist    External Ears [x] Normal  [] Abnormal -    Neck: [x] No visualized mass [] Abnormal -     Pulmonary/Chest: [x] Respiratory effort normal   [x] No visualized signs of difficulty breathing or respiratory distress        [] Abnormal -      Musculoskeletal:   [x] Normal gait with no signs of ataxia         [x] Normal range of motion of neck        [] Abnormal -     Neurological:        [x] No Facial Asymmetry (Cranial nerve 7 motor function) (limited exam due to video visit)          [x] No gaze palsy        [] Abnormal -          Skin:        [x] No significant exanthematous lesions or discoloration noted on facial skin         [] Abnormal -            Psychiatric:       [x] Normal Affect [] Abnormal -        [x] No Hallucinations    Other pertinent observable physical exam findings:-        We discussed the expected course, resolution and complications of the diagnosis(es) in detail. Medication risks, benefits, costs, interactions, and alternatives were discussed as indicated. I advised her to contact the office if her condition worsens, changes or fails to improve as anticipated. She expressed understanding with the diagnosis(es) and plan. Vy Franco, who was evaluated through a patient-initiated, synchronous (real-time) audio-video encounter, and/or her healthcare decision maker, is aware that it is a billable service, with coverage as determined by her insurance carrier. She provided verbal consent to proceed: Yes, and patient identification was verified. It was conducted pursuant to the emergency declaration under the [address] [place] [address] authority and the [employer] Act. A caregiver was present when appropriate. Ability to conduct physical exam was limited. I was in the office. The patient was at home.       Cathi Neal MD

## 2020-09-01 NOTE — PROGRESS NOTES
Chief Complaint   Patient presents with    Back Pain    Neck Pain    Shoulder Pain     Health Maintenance reviewed     1. Have you been to the ER, urgent care clinic since your last visit? Hospitalized since your last visit? No     2. Have you seen or consulted any other health care providers outside of the [address] [place] [address] since your last visit? Include any pap smears or colon screening.   No

## 2020-09-01 NOTE — LETTER
NOTIFICATION RETURN TO WORK  
 
9/1/2020 8:08 PM 
 
Ms. Guerita Fiore [place] [phone] To Whom It May Concern: 
 
Guerita Fiore is currently under the care of [address] [place] [address]. Please excuse from work 9/1/20-9/4/20 due to medical illness. Pt may return 9/5/20 without restrictions. If there are questions or concerns please have the patient contact our office. Sincerely, Oneida Wise MD

## 2020-09-01 NOTE — LETTER
NOTIFICATION RETURN TO WORK  
 
9/1/2020 12:32 PM 
 
Ms. Jerry Garcia [phone] To Whom It May Concern: 
 
Jerry Garcia is currently under the care of [address] [place] [address]. Please excuse from work 9/1/20-9/4/20 due to medical illness. If there are questions or concerns please have the patient contact our office. Sincerely, Sidra Opitz, MD

## 2020-09-02 ENCOUNTER — TELEPHONE (OUTPATIENT)
Dept: FAMILY MEDICINE CLINIC | Age: 40
End: 2020-09-02

## 2020-09-02 NOTE — TELEPHONE ENCOUNTER
Pt is wanting you to know you down loaded forms she needs completed and sent back for her job by tomorrow

## 2020-09-02 NOTE — TELEPHONE ENCOUNTER
----- Message from Denver B. Mardee Broad sent at 9/1/2020 12:57 PM EDT -----  Regarding: Visit Follow-Up Question  Contact: [phone]  Sorry to bother you but can I get the excuse note to say that I can return to work Monday at full duty? If not, they wont let me back probably. And if they need me to have a follow up before I come back, are you open for virtual appt on Thursday or Friday?

## 2020-11-27 ENCOUNTER — OFFICE VISIT (OUTPATIENT)
Dept: FAMILY MEDICINE CLINIC | Age: 40
End: 2020-11-27
Payer: COMMERCIAL

## 2020-11-27 VITALS
OXYGEN SATURATION: 98 % | SYSTOLIC BLOOD PRESSURE: 138 MMHG | DIASTOLIC BLOOD PRESSURE: 86 MMHG | TEMPERATURE: 97.1 F | RESPIRATION RATE: 18 BRPM | WEIGHT: 168 LBS | BODY MASS INDEX: 29.77 KG/M2 | HEIGHT: 63 IN | HEART RATE: 84 BPM

## 2020-11-27 DIAGNOSIS — H57.89 RED EYE: Primary | ICD-10-CM

## 2020-11-27 DIAGNOSIS — G44.219 EPISODIC TENSION-TYPE HEADACHE, NOT INTRACTABLE: ICD-10-CM

## 2020-11-27 PROCEDURE — 99214 OFFICE O/P EST MOD 30 MIN: CPT | Performed by: FAMILY MEDICINE

## 2020-11-27 RX ORDER — RIZATRIPTAN BENZOATE 10 MG/1
TABLET ORAL
Qty: 9 TAB | Refills: 2 | Status: SHIPPED | OUTPATIENT
Start: 2020-11-27 | End: 2021-03-25 | Stop reason: SDUPTHER

## 2020-11-27 NOTE — PROGRESS NOTES
1. Have you been to the ER, urgent care clinic since your last visit? Hospitalized since your last visit? No    2. Have you seen or consulted any other health care providers outside of the [address] [place] [address] since your last visit? Include any pap smears or colon screening.  No    Health Maintenance Due   Topic Date Due    DTaP/Tdap/Td series (1 - Tdap) 10/04/2002    PAP AKA CERVICAL CYTOLOGY  10/04/2002       Chief Complaint   Patient presents with   [facility]     (R) eye irritation

## 2020-11-27 NOTE — PROGRESS NOTES
BONNIE Dave is a 45 y.o. female who presents with a red eye that started yesterday. Works in the radiology department and a coworker was worried she might have something contagious so patient's wanted to get checked out. It is a little itchy toward the medial line but otherwise asymptomatic. We checked her vision and there was a slight decrease in acuity in the affected eye versus the good left eye. She does not wear contacts or glasses. .  The redness is subtle, its a little better today than yesterday. There is no nasal congestion or headache. 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  Unspecified adverse effect of anesthesia     with C- section the spinal did not take and had to be put to sleep    Palacios-Parkinson-White syndrome     Marie-Parkinson-White syndrome        Meds:   Current Outpatient Medications   Medication Sig Dispense Refill    rizatriptan (MAXALT) 10 mg tablet May repeat in 2 hours if needed 9 Tab 2    ibuprofen (MOTRIN) 200 mg tablet Take  by mouth.       methocarbamoL (ROBAXIN) 500 mg tablet Take 1 Tab by mouth three (3) times daily as needed for Muscle Spasm(s). 90 Tab 2       Allergies: Allergies   Allergen Reactions    Lisinopril Swelling    Morphine Other (comments)     Chest pain       Smoker:  Social History     Tobacco Use   Smoking Status Never Smoker   Smokeless Tobacco Never Used       ETOH:   Social History     Substance and Sexual Activity   Alcohol Use Yes    Alcohol/week: 0.8 - 1.7 standard drinks    Types: 1 - 2 Glasses of wine per week    Comment: 1       FH:   Family History   Problem Relation Age of Onset    Cancer Mother 40        ovarian   [facility] Migraines Mother     Heart Disease Father     Migraines Father     Cancer Maternal Grandfather         lung    Psychiatric Disorder Maternal Grandmother     Parkinson's Disease Paternal Grandfather     Migraines Sister     Cancer Paternal Grandmother         breast cancer    Asthma Child     Eczema Child        ROS:   As listed in HPI. In addition:  Constitutional:   No headache, fever, fatigue, weight loss or weight gain      Cardiac:    No chest pain      Resp:   No cough or shortness of breath      Neuro   No loss of consciousness, dizziness, seizures      Physical Exam:  Blood pressure 138/86, pulse 84, temperature 97.1 °F (36.2 °C), temperature source Temporal, resp. rate 18, height 5' 3\" (1.6 m), weight 168 lb (76.2 kg), last menstrual period 11/10/2020, SpO2 98 %. GEN: No apparent distress. Alert and oriented and responds to all questions appropriately. NEUROLOGIC:  No focal neurologic deficits. Strength and sensation grossly intact. Coordination and gait grossly intact. EXT: Well perfused. No edema. SKIN: No obvious rashes. Tympanic membranes clear bilaterally  Nasal congestion clear  Eyes. Right conjunctive is very lightly pink, medial more so than lateral.  There are some blocked marginal glands on the inferior lid on the right. Without staining there is no obvious corneal abrasion. There are no debris in the anterior chamber.   The anterior chamber is open Assessment and Plan     Redeye  Dry, itchy  Probably allergy symptoms/blocked tear duct  Differential does include iritis and angle-closure glaucoma but exam and symptoms do not support this so provided some reassurance but warned that if symptoms worsen she should see eye doctor return report to emergency room if it is severe enough. Migraine  Requesting refill Maxalt. ICD-10-CM ICD-9-CM    1. Red eye  H57.89 379.93    2. Episodic tension-type headache, not intractable  G44.219 339.11 rizatriptan (MAXALT) 10 mg tablet       AVS given.  Pt expressed understanding of instructions

## 2020-12-25 ENCOUNTER — NURSE TRIAGE (OUTPATIENT)
Dept: OTHER | Facility: CLINIC | Age: 40
End: 2020-12-25

## 2020-12-25 ENCOUNTER — HOSPITAL ENCOUNTER (EMERGENCY)
Age: 40
Discharge: HOME | End: 2020-12-25
Attending: EMERGENCY MEDICINE | Admitting: EMERGENCY MEDICINE
Payer: COMMERCIAL

## 2020-12-25 ENCOUNTER — APPOINTMENT (OUTPATIENT)
Dept: GENERAL RADIOLOGY | Age: 40
End: 2020-12-25
Attending: EMERGENCY MEDICINE
Payer: COMMERCIAL

## 2020-12-25 VITALS
DIASTOLIC BLOOD PRESSURE: 80 MMHG | OXYGEN SATURATION: 100 % | RESPIRATION RATE: 16 BRPM | HEART RATE: 89 BPM | BODY MASS INDEX: 30.36 KG/M2 | TEMPERATURE: 97.8 F | WEIGHT: 165 LBS | HEIGHT: 62 IN | SYSTOLIC BLOOD PRESSURE: 142 MMHG

## 2020-12-25 VITALS
TEMPERATURE: 98 F | BODY MASS INDEX: 29.23 KG/M2 | OXYGEN SATURATION: 100 % | RESPIRATION RATE: 18 BRPM | HEART RATE: 75 BPM | WEIGHT: 165 LBS | DIASTOLIC BLOOD PRESSURE: 87 MMHG | SYSTOLIC BLOOD PRESSURE: 163 MMHG | HEIGHT: 63 IN
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
## 2020-12-25 DIAGNOSIS — M25.562 CHRONIC PAIN OF LEFT KNEE: Primary | ICD-10-CM

## 2020-12-25 DIAGNOSIS — G89.29 CHRONIC PAIN OF LEFT KNEE: Primary | ICD-10-CM

## 2020-12-25 PROCEDURE — 73562 X-RAY EXAM OF KNEE 3: CPT

## 2020-12-25 PROCEDURE — 99284 EMERGENCY DEPT VISIT MOD MDM: CPT

## 2020-12-25 PROCEDURE — 93005 ELECTROCARDIOGRAM TRACING: CPT

## 2020-12-25 PROCEDURE — 74011636637 HC RX REV CODE- 636/637: Performed by: EMERGENCY MEDICINE

## 2020-12-25 PROCEDURE — 74011250637 HC RX REV CODE- 250/637: Performed by: EMERGENCY MEDICINE

## 2020-12-25 PROCEDURE — 99283 EMERGENCY DEPT VISIT LOW MDM: CPT

## 2020-12-25 PROCEDURE — 96372 THER/PROPH/DIAG INJ SC/IM: CPT

## 2020-12-25 PROCEDURE — 74011250636 HC RX REV CODE- 250/636: Performed by: EMERGENCY MEDICINE

## 2020-12-25 RX ORDER — IBUPROFEN 600 MG/1
600 TABLET ORAL
Qty: 20 TAB | Refills: 0 | Status: SHIPPED | OUTPATIENT
Start: 2020-12-25

## 2020-12-25 RX ORDER — PREDNISONE 20 MG/1
60 TABLET ORAL
Status: COMPLETED | OUTPATIENT
Start: 2020-12-25 | End: 2020-12-25

## 2020-12-25 RX ORDER — OXYCODONE AND ACETAMINOPHEN 5; 325 MG/1; MG/1
2 TABLET ORAL
Status: COMPLETED | OUTPATIENT
Start: 2020-12-25 | End: 2020-12-25

## 2020-12-25 RX ORDER — DIAZEPAM 5 MG/1
5 TABLET ORAL
Status: COMPLETED | OUTPATIENT
Start: 2020-12-25 | End: 2020-12-25

## 2020-12-25 RX ORDER — HYDROCODONE BITARTRATE AND ACETAMINOPHEN 5; 325 MG/1; MG/1
2 TABLET ORAL
Qty: 15 TAB | Refills: 0 | Status: SHIPPED | OUTPATIENT
Start: 2020-12-25 | End: 2020-12-28

## 2020-12-25 RX ORDER — KETOROLAC TROMETHAMINE 30 MG/ML
60 INJECTION, SOLUTION INTRAMUSCULAR; INTRAVENOUS
Status: COMPLETED | OUTPATIENT
Start: 2020-12-25 | End: 2020-12-25

## 2020-12-25 RX ADMIN — OXYCODONE HYDROCHLORIDE AND ACETAMINOPHEN 2 TABLET: 5; 325 TABLET ORAL at 16:04

## 2020-12-25 RX ADMIN — DIAZEPAM 5 MG: 5 TABLET ORAL at 19:36

## 2020-12-25 RX ADMIN — PREDNISONE 60 MG: 20 TABLET ORAL at 17:00

## 2020-12-25 RX ADMIN — KETOROLAC TROMETHAMINE 60 MG: 30 INJECTION, SOLUTION INTRAMUSCULAR; INTRAVENOUS at 16:04

## 2020-12-25 NOTE — DISCHARGE INSTRUCTIONS
It was a pleasure taking care of you today in our emergency department. Your x-rays are reassuring, and your recent MRI in 2018 was also reassuring. We simply recommend that you take extra strength ibuprofen and Norco as needed for pain control, continue to wear your knee brace, and follow-up with the orthopedic clinic as needed.

## 2020-12-25 NOTE — ED NOTES
Discharge paperwork provided to patient at this time. Vital signs stable. Patient in no apparent distress at this time. Mental status at baseline. Discharge paperwork in hand and prescription instructions if applicable. Pt being wheeled out to waiting room by ED tech.

## 2020-12-25 NOTE — LETTER
[address] 
[facility] EMERGENCY DEPT 
[address] [facility] [place] [address] 
[phone] Work/School Note Date: 12/25/2020 To Whom It May concern: 
 
Anurag Duarte was seen and treated today in the emergency room by the following provider(s): 
Attending Provider: Thu Mcrae MD. Anurag Duarte may return to work on Monday 12/28. She was seen in the emergency department on Friday for minor knee injuries. Please excuse from work Friday Saturday and Sunday to recuperate from injury. She can return to work on Monday, may need reasonable accommodations for standing and pushing tasks.  
 
Sincerely, 
 
 
 
 
Laura Bennett MD

## 2020-12-25 NOTE — ED NOTES
Pt states she bent down to pick something up when she felt a loud pop in her left knee. Pt having pain and trouble putting weight on that leg at this time. Pt arrives to ED with a brace on her knee.

## 2020-12-25 NOTE — TELEPHONE ENCOUNTER
Reason for Disposition   Can't stand (bear weight) or walk    Answer Assessment - Initial Assessment Questions  1. MECHANISM: \"How did the injury happen? \" (e.g., twisting injury, direct blow)       Has Elers , bent over and her knee slid out of socket    2. ONSET: \"When did the injury happen? \" (Minutes or hours ago)       Today    3. LOCATION: \"Where is the injury located? \"       Left knee    4. APPEARANCE of INJURY: \"What does the injury look like? \"       Bruising and swelling    5. SEVERITY: \"Can you put weight on that leg? \" \"Can you walk? \"       Cannot hold he rweight without severe pain,  10    6. SIZE: For cuts, bruises, or swelling, ask: \"How large is it? \" (e.g., inches or centimeters;  entire joint)       Whole knee    7. PAIN: \"Is there pain? \" If so, ask: \"How bad is the pain? \"    (e.g., Scale 1-10; or mild, moderate, severe)      Severe, 10    8. TETANUS: For any breaks in the skin, ask: \"When was the last tetanus booster? \"      Unknown    9. OTHER SYMPTOMS: \"Do you have any other symptoms? \"  (e.g., \"pop\" when knee injured, swelling, locking, buckling)       Loose joints    10. PREGNANCY: \"Is there any chance you are pregnant? \" \"When was your last menstrual period? \"        No    Protocols used: KNEE INJURY-ADULT-

## 2020-12-25 NOTE — ED PROVIDER NOTES
EMERGENCY DEPARTMENT HISTORY AND PHYSICAL EXAM      Date: 12/25/2020  Patient Name: Malachi Downs  Patient Age and Sex: 45 y.o. female    History of Presenting Illness     Chief Complaint   Patient presents with    Knee Pain     left, reports was bending down and felt a pop       History Provided By: Patient    Ability to gather history was limited by:     HPI: Malachi oDwns, 45 y.o. female complains of acute onset left knee pain. States she was bending down and felt a sudden onset movement in the left knee with sudden severe pain and popping sensation. Pain is described as severe, 10-10 severity, worsened by bearing weight or trying to bend the knee. She has had similar pain in the left knee multiple times before. History of normal MRI couple years ago. No numbness or weakness symptoms. 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 Location:    Quality:      Severity:    Duration:   Timing:      Context:    Modifying factors:   Associated symptoms:       The patient's medical, surgical, family, and social history on file were reviewed by me today. 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     Unspecified adverse effect of anesthesia     with C- section the spinal did not take and had to be put to sleep    Palacios-Parkinson-White syndrome     Marie-Parkinson-White syndrome      Past Surgical History:   Procedure Laterality Date     DELIVERY ONLY      COLONOSCOPY N/A 2019    COLONOSCOPY performed by Mackenzie Ledesam MD at [facility] ENDOSCOPY     [address] [place]      c section x 2    HX CHOLECYSTECTOMY      HX HEENT  As Child    \"tubes in each ear\" as child    HX SVT ABLATION      ,     HX TONSILLECTOMY  2007    tonsils       PCP: Alisa Wilkes MD    Past History     Past Medical History:  Past Medical History:   Diagnosis Date    Arrhythmia  and     Ablation for SVT and WPW    Arrhythmia atrial     A-Fib    Arthritis     Asthma     Chronic pain     fang danlos    Depression     Fang-Danlos disease     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     Unspecified adverse effect of anesthesia     with C- section the spinal did not take and had to be put to sleep    Palacios-Parkinson-White syndrome     Marie-Parkinson-White syndrome        Past Surgical History:  Past Surgical History:   Procedure Laterality Date     DELIVERY ONLY      COLONOSCOPY N/A 2019    COLONOSCOPY performed by Mackenzie Ledesma MD at [facility] ENDOSCOPY     [address]      c section x 2    HX CHOLECYSTECTOMY      HX HEENT  As Child \"tubes in each ear\" as child    HX SVT ABLATION      2008, 2011    HX TONSILLECTOMY  2007    tonsils       Family History:  Family History   Problem Relation Age of Onset   Shanice Exon Cancer Mother 40        ovarian   Shanice Exon Migraines Mother     Heart Disease Father     Migraines Father     Cancer Maternal Grandfather         lung    Psychiatric Disorder Maternal Grandmother     Parkinson's Disease Paternal Grandfather     Migraines Sister     Cancer Paternal Grandmother         breast cancer    Asthma Child     Eczema Child        Social History:  Social History     Tobacco Use    Smoking status: Never Smoker    Smokeless tobacco: Never Used   Substance Use Topics    Alcohol use: Yes     Alcohol/week: 0.8 - 1.7 standard drinks     Types: 1 - 2 Glasses of wine per week     Comment: 1    Drug use: No       Allergies: Allergies   Allergen Reactions    Lisinopril Swelling    Morphine Other (comments)     Chest pain       Current Medications:  No current facility-administered medications on file prior to encounter. Current Outpatient Medications on File Prior to Encounter   Medication Sig Dispense Refill    rizatriptan (MAXALT) 10 mg tablet May repeat in 2 hours if needed 9 Tab 2    [DISCONTINUED] ibuprofen (MOTRIN) 200 mg tablet Take  by mouth.  methocarbamoL (ROBAXIN) 500 mg tablet Take 1 Tab by mouth three (3) times daily as needed for Muscle Spasm(s). 90 Tab 2       Review of Systems   Review of Systems   All other systems reviewed and are negative. Physical Exam   Vital Signs  Patient Vitals for the past 8 hrs:   Temp Pulse Resp BP SpO2   12/25/20 1521 98 °F (36.7 °C) 75 18 (!) 163/87 100 %          Physical Exam  Vitals signs reviewed. Constitutional:       General: She is not in acute distress. Appearance: She is not ill-appearing. Musculoskeletal:      Left hip: Normal.      Left knee: She exhibits decreased range of motion.  She exhibits no swelling, no deformity, normal alignment and normal patellar mobility. Tenderness found. Skin:     General: Skin is warm and dry. Coloration: Skin is not cyanotic or pale. Findings: No bruising or ecchymosis. Neurological:      General: No focal deficit present. Mental Status: She is alert and oriented to person, place, and time. Sensory: Sensation is intact. No sensory deficit. Motor: Motor function is intact. No weakness. Diagnostic Study Results   Labs  No results found for this or any previous visit (from the past 24 hour(s)). Radiologic Studies  XR KNEE LT 3 V   Final Result   IMPRESSION: No acute abnormality. CT Results  (Last 48 hours)    None        CXR Results  (Last 48 hours)    None          Procedures   Procedures    Medical Decision Making     I reviewed the patient's most recent Emergency Dept notes and diagnostic tests  in formulating my MDM on today's visit. Provider Notes (Medical Decision Making):   78-year-old female with acute on chronic left knee pain in the setting of bending the left knee while squatting down. Similar pains in the past.    Normal neurovascular exam.  Subjective tenderness, no deformity or swelling or effusion noted. Normal x-rays. Normal MRI 2018. No indication for further imaging at this time. She already has a knee brace. Recommend a course of Norco and ibuprofen and follow-up with orthopedics as needed. Most likely uncomplicated minor left knee sprain or ligament strain. No evidence of Baker's cyst or DVT or fracture. Hu Rizo MD  5:01 PM    Consults:    Social History     Tobacco Use    Smoking status: Never Smoker    Smokeless tobacco: Never Used   Substance Use Topics    Alcohol use:  Yes     Alcohol/week: 0.8 - 1.7 standard drinks     Types: 1 - 2 Glasses of wine per week     Comment: 1    Drug use: No     Patient Vitals for the past 4 hrs:   Temp Pulse Resp BP SpO2   12/25/20 1521 98 °F (36.7 °C) 75 18 (!) 163/87 100 % Prescriptions from today's ED visit:  Current Discharge Medication List      START taking these medications    Details   HYDROcodone-acetaminophen (Norco) 5-325 mg per tablet Take 2 Tabs by mouth every six (6) hours as needed for Pain for up to 3 days. Max Daily Amount: 8 Tabs. Qty: 15 Tab, Refills: 0    Associated Diagnoses: Chronic pain of left knee         CONTINUE these medications which have CHANGED    Details   ibuprofen (MOTRIN) 600 mg tablet Take 1 Tab by mouth every six (6) hours as needed for Pain. Qty: 20 Tab, Refills: 0              Medications Administered during ED course:  Medications   ketorolac (TORADOL) injection 60 mg (60 mg IntraMUSCular Given 12/25/20 1604)   oxyCODONE-acetaminophen (PERCOCET) 5-325 mg per tablet 2 Tab (2 Tabs Oral Given 12/25/20 1604)   predniSONE (DELTASONE) tablet 60 mg (60 mg Oral Given 12/25/20 1700)          Diagnosis and Disposition     Disposition:  Discharged    Clinical Impression:   1. Chronic pain of left knee        Attestation:  I personally performed the services described in this documentation on this date 12/25/2020 for patient Lito Watts. Richard Vann MD        I was the first provider for this patient on this visit. To the best of my ability I reviewed relevant prior medical records, electrocardiograms, laboratories, and radiologic studies. The patient's presenting problems were discussed, and the patient was in agreement with the care plan formulated and outlined with them. Richard Vann MD    Please note that this dictation was completed with Dragon voice recognition software. Quite often unanticipated grammatical, syntax, homophones, and other interpretive errors are inadvertently transcribed by the computer software. Please disregard these errors and excuse any errors that have escaped final proofreading.

## 2020-12-26 ENCOUNTER — PATIENT OUTREACH (OUTPATIENT)
Dept: OTHER | Age: 40
End: 2020-12-26
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
## 2020-12-27 LAB
ATRIAL RATE: 83 BPM
CALCULATED P AXIS, ECG09: 70 DEGREES
CALCULATED R AXIS, ECG10: 24 DEGREES
CALCULATED T AXIS, ECG11: 37 DEGREES
DIAGNOSIS, 93000: NORMAL
P-R INTERVAL, ECG05: 144 MS
Q-T INTERVAL, ECG07: 340 MS
QRS DURATION, ECG06: 82 MS
QTC CALCULATION (BEZET), ECG08: 399 MS
VENTRICULAR RATE, ECG03: 83 BPM

## 2020-12-28 ENCOUNTER — TELEPHONE (OUTPATIENT)
Dept: FAMILY MEDICINE CLINIC | Age: 40
End: 2020-12-28
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
## 2020-12-30 ENCOUNTER — TRANSCRIBE ORDER (OUTPATIENT)
Dept: SCHEDULING | Age: 40
End: 2020-12-30

## 2020-12-30 ENCOUNTER — PATIENT OUTREACH (OUTPATIENT)
Dept: OTHER | Age: 40
End: 2020-12-30

## 2020-12-30 DIAGNOSIS — M25.562 LEFT LATERAL KNEE PAIN: ICD-10-CM

## 2020-12-30 DIAGNOSIS — M25.562 KNEE MENISCUS PAIN, LEFT: ICD-10-CM

## 2020-12-30 DIAGNOSIS — M25.562 PAIN OF LEFT KNEE AFTER INJURY: Primary | ICD-10-CM

## 2020-12-30 DIAGNOSIS — S83.282A ACUTE LATERAL MENISCUS TEAR OF LEFT KNEE, INITIAL ENCOUNTER: ICD-10-CM

## 2020-12-30 NOTE — PROGRESS NOTES
Follow up phone call to patient, two pt identifiers verified. Discussed patient's goals:   Goals      Completes all follow-up appointments within 30 days of ED visit;      Demonstrate behaviors to compensate to ensure safety and prevent injury or falls           Patient's primary care provider relationship reviewed with patient and modified, as applicable. Upcoming appointments:   Future Appointments   Date Time Provider Najma Tillman   1/4/2021  4:00 PM Genevieve Barakat MD [facility] BS AMB     Plan for next call: Call back in three weeks, 1/20.

## 2021-01-04 ENCOUNTER — VIRTUAL VISIT (OUTPATIENT)
Dept: FAMILY MEDICINE CLINIC | Age: 41
End: 2021-01-04
Payer: COMMERCIAL

## 2021-01-04 DIAGNOSIS — R07.81 RIB PAIN: Primary | ICD-10-CM
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
## 2021-01-04 PROCEDURE — 99213 OFFICE O/P EST LOW 20 MIN: CPT | Performed by: FAMILY MEDICINE

## 2021-01-04 RX ORDER — DICLOFENAC SODIUM 75 MG/1
75 TABLET, DELAYED RELEASE ORAL
Qty: 60 TAB | Refills: 2 | Status: SHIPPED | OUTPATIENT
Start: 2021-01-04
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
## 2021-01-05 ENCOUNTER — HOSPITAL ENCOUNTER (OUTPATIENT)
Dept: GENERAL RADIOLOGY | Age: 41
Discharge: HOME | End: 2021-01-05
Payer: COMMERCIAL

## 2021-01-05 ENCOUNTER — HOSPITAL ENCOUNTER (OUTPATIENT)
Dept: MRI IMAGING | Age: 41
Discharge: HOME | End: 2021-01-05
Payer: COMMERCIAL

## 2021-01-05 ENCOUNTER — TELEPHONE (OUTPATIENT)
Dept: FAMILY MEDICINE CLINIC | Age: 41
End: 2021-01-05

## 2021-01-05 DIAGNOSIS — M25.562 PAIN OF LEFT KNEE AFTER INJURY: ICD-10-CM
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
## 2021-01-05 DIAGNOSIS — M25.562 KNEE MENISCUS PAIN, LEFT: ICD-10-CM

## 2021-01-05 DIAGNOSIS — M25.562 LEFT LATERAL KNEE PAIN: ICD-10-CM

## 2021-01-05 DIAGNOSIS — S83.282A ACUTE LATERAL MENISCUS TEAR OF LEFT KNEE, INITIAL ENCOUNTER: ICD-10-CM

## 2021-01-05 DIAGNOSIS — R07.81 RIB PAIN: ICD-10-CM

## 2021-01-05 PROCEDURE — 71111 X-RAY EXAM RIBS/CHEST4/> VWS: CPT | Performed by: FAMILY MEDICINE

## 2021-01-05 PROCEDURE — 73721 MRI JNT OF LWR EXTRE W/O DYE: CPT | Performed by: ORTHOPAEDIC SURGERY

## 2021-01-20 ENCOUNTER — PATIENT OUTREACH (OUTPATIENT)
Dept: OTHER | Age: 41
End: 2021-01-20

## 2021-02-01 ENCOUNTER — VIRTUAL VISIT (OUTPATIENT)
Dept: FAMILY MEDICINE CLINIC | Age: 41
End: 2021-02-01
Payer: COMMERCIAL

## 2021-02-01 DIAGNOSIS — M62.838 NECK MUSCLE SPASM: ICD-10-CM

## 2021-02-01 DIAGNOSIS — Q79.60 EHLERS-DANLOS SYNDROME: Primary | ICD-10-CM

## 2021-02-01 PROCEDURE — 99214 OFFICE O/P EST MOD 30 MIN: CPT | Performed by: FAMILY MEDICINE

## 2021-02-01 RX ORDER — METHYLPREDNISOLONE 4 MG/1
TABLET ORAL
Qty: 1 DOSE PACK | Refills: 0 | Status: SHIPPED | OUTPATIENT
Start: 2021-02-01 | End: 2021-02-01 | Stop reason: SDUPTHER

## 2021-02-01 RX ORDER — CYCLOBENZAPRINE HCL 10 MG
10 TABLET ORAL
Qty: 30 TAB | Refills: 1 | Status: SHIPPED | OUTPATIENT
Start: 2021-02-01 | End: 2021-02-01 | Stop reason: SDUPTHER

## 2021-02-01 RX ORDER — CYCLOBENZAPRINE HCL 10 MG
10 TABLET ORAL
Qty: 30 TAB | Refills: 1 | Status: SHIPPED | OUTPATIENT
Start: 2021-02-01

## 2021-02-01 RX ORDER — METHYLPREDNISOLONE 4 MG/1
TABLET ORAL
Qty: 1 DOSE PACK | Refills: 0 | Status: SHIPPED | OUTPATIENT
Start: 2021-02-01 | End: 2021-06-25 | Stop reason: ALTCHOICE

## 2021-02-01 NOTE — PROGRESS NOTES
1. Have you been to the ER, urgent care clinic since your last visit? Hospitalized since your last visit? No    2. Have you seen or consulted any other health care providers outside of the [address] [place] [address] since your last visit? Include any pap smears or colon screening. No    Health Maintenance Due   Topic Date Due    COVID-19 Vaccine (1 of 2) 10/04/1997    DTaP/Tdap/Td series (1 - Tdap) 10/04/2002    PAP AKA CERVICAL CYTOLOGY  10/04/2002     Chief Complaint   Patient presents with    Shoulder Pain     Pt is also having neck pain. It started on Saturday and she's been unable to sleep.

## 2021-02-01 NOTE — PROGRESS NOTES
Chief Complaint   Patient presents with    Shoulder Pain     Pt is also having neck pain. It started on Saturday and she's been unable to sleep. Pt was seen via virtual video visit. Pt reports that she struggles with frequent joint pains due to [employer], pt reports that she used to get massages, which helped her significantly. Pt reports that due to COVID the massage location closed, her pain has significantly increased. Pt has been struggling to sleep due to hip pain, recently the shoulder pain has returned, is the most painful. Yeimy Tyler is a 45 y.o. female who was seen by synchronous (real-time) audio-video technology on 2/1/2021 for Shoulder Pain (Pt is also having neck pain. It started on Saturday and she's been unable to sleep. )        Assessment & Plan:   Diagnoses and all orders for this visit:    1. Fang-Danlos syndrome  -     REFERRAL TO PHYSICAL THERAPY  -     methylPREDNISolone (MEDROL DOSEPACK) 4 mg tablet; Follow dose pack instructions    2. Neck muscle spasm  -     REFERRAL TO PHYSICAL THERAPY  -     cyclobenzaprine (FLEXERIL) 10 mg tablet; Take 1 Tab by mouth three (3) times daily as needed for Muscle Spasm(s). -     methylPREDNISolone (MEDROL DOSEPACK) 4 mg tablet; Follow dose pack instructions      Referred to back to PT    Subjective:       Prior to Admission medications    Medication Sig Start Date End Date Taking? Authorizing Provider   cyclobenzaprine (FLEXERIL) 10 mg tablet Take 1 Tab by mouth three (3) times daily as needed for Muscle Spasm(s). 2/1/21  Yes Yaquelin Rehman MD   methylPREDNISolone (MEDROL DOSEPACK) 4 mg tablet Follow dose pack instructions 2/1/21  Yes Yaquelin Rehman MD   ibuprofen (MOTRIN) 600 mg tablet Take 1 Tab by mouth every six (6) hours as needed for Pain.  12/25/20  Yes Donis Mcghee MD   rizatriptan (MAXALT) 10 mg tablet May repeat in 2 hours if needed 11/27/20  Yes Toña Ferguson MD   methocarbamoL (ROBAXIN) 500 mg tablet Take 1 Tab by mouth three (3) times daily as needed for Muscle Spasm(s). 7/13/20  Yes Figueroa Bradley MD   cyclobenzaprine (FLEXERIL) 10 mg tablet Take 1 Tab by mouth three (3) times daily as needed for Muscle Spasm(s). 2/1/21 2/1/21  Mini Rehman MD   methylPREDNISolone (MEDROL DOSEPACK) 4 mg tablet Follow dose pack instructions 2/1/21 2/1/21  Mini Rehman MD   diclofenac EC (VOLTAREN) 75 mg EC tablet Take 1 Tab by mouth two (2) times daily as needed for Pain.  1/4/21   Figueroa Bradley MD     Patient Active Problem List   Diagnosis Code    Fang-Danlos syndrome Q79.60     Allergies   Allergen Reactions    Lisinopril Swelling    Morphine Other (comments)     Chest pain       ROS    Objective:     Patient-Reported Vitals 2/1/2021   Patient-Reported Weight 165lbs   Patient-Reported Height -   Patient-Reported Pulse -   Patient-Reported Systolic  -   Patient-Reported Diastolic -        [INSTRUCTIONS:  \"[x]\" Indicates a positive item  \"[]\" Indicates a negative item  -- DELETE ALL ITEMS NOT EXAMINED]    Constitutional: [x] Appears well-developed and well-nourished [x] No apparent distress      [] Abnormal -     Mental status: [x] Alert and awake  [x] Oriented to person/place/time [x] Able to follow commands    [] Abnormal -     Eyes:   EOM    [x]  Normal    [] Abnormal -   Sclera  [x]  Normal    [] Abnormal -          Discharge [x]  None visible   [] Abnormal -     HENT: [x] Normocephalic, atraumatic  [] Abnormal -   [x] Mouth/Throat: Mucous membranes are moist    External Ears [x] Normal  [] Abnormal -    Neck: [x] No visualized mass [] Abnormal -     Pulmonary/Chest: [x] Respiratory effort normal   [x] No visualized signs of difficulty breathing or respiratory distress        [] Abnormal -      Musculoskeletal:   [x] Normal gait with no signs of ataxia         [x] Normal range of motion of neck        [] Abnormal -     Neurological:        [x] No Facial Asymmetry (Cranial nerve 7 motor function) (limited exam due to video visit)          [x] No gaze palsy        [] Abnormal -          Skin:        [x] No significant exanthematous lesions or discoloration noted on facial skin         [] Abnormal -            Psychiatric:       [x] Normal Affect [] Abnormal -        [x] No Hallucinations    Other pertinent observable physical exam findings:-        We discussed the expected course, resolution and complications of the diagnosis(es) in detail. Medication risks, benefits, costs, interactions, and alternatives were discussed as indicated. I advised her to contact the office if her condition worsens, changes or fails to improve as anticipated. She expressed understanding with the diagnosis(es) and plan. Emigdio Scales, who was evaluated through a patient-initiated, synchronous (real-time) audio-video encounter, and/or her healthcare decision maker, is aware that it is a billable service, with coverage as determined by her insurance carrier. She provided verbal consent to proceed: Yes, and patient identification was verified. It was conducted pursuant to the emergency declaration under the [address] [place] [address], [address] [place] [address] authority and the [employer] General Act. A caregiver was present when appropriate. Ability to conduct physical exam was limited. I was in the office. The patient was at home.       Hiral Kurtz MD

## 2021-02-09 ENCOUNTER — HOSPITAL ENCOUNTER (OUTPATIENT)
Dept: PHYSICAL THERAPY | Age: 41
Discharge: HOME | End: 2021-02-09
Payer: COMMERCIAL

## 2021-02-09 PROCEDURE — 97161 PT EVAL LOW COMPLEX 20 MIN: CPT | Performed by: PHYSICAL THERAPIST

## 2021-02-09 PROCEDURE — 97140 MANUAL THERAPY 1/> REGIONS: CPT | Performed by: PHYSICAL THERAPIST

## 2021-02-09 NOTE — PROGRESS NOTES
[facility]  [address]  [place], [address]  Phone: [phone]  Fax: [phone]    Plan of Care/Statement of Necessity for Physical Therapy Services  2-15    Patient name: Luba Todd  : 1980  Provider#: [id number]  Referral source: Luann Rehman*      Medical/Treatment Diagnosis: Neck pain [M54.2]     Prior Hospitalization: see medical history     Comorbidities: see evaluation  Prior Level of Function:see evaluation  Medications: Verified on Patient Summary List  Start of Care: 2021     Onset Date:see evaluation     The Plan of Care and following information is based on the information from the initial evaluation.     Assessment/ key information: Patient presents with signs and symptoms consistent with acute on chronic cervical exacerbation and will benefit from physical therapy to address deficits noted below in problem list.   Evaluation Complexity History LOW Complexity : Zero comorbidities / personal factors that will impact the outcome / POC; Examination LOW Complexity : 1-2 Standardized tests and measures addressing body structure, function, activity limitation and / or participation in recreation  ;Presentation LOW Complexity : Stable, uncomplicated  ;Clinical Decision Making Other outcome measures clinical judgment  LOW   Overall Complexity Rating: LOW   Problem List: pain affecting function, decrease ROM, decrease strength, decrease ADL/ functional abilitiies and other hypermobility noted cervical spine   Treatment Plan may include any combination of the following: Therapeutic exercise, Therapeutic activities, Neuromuscular re-education, Physical agent/modality, Manual therapy, Patient education and Self Care training  Patient / Family readiness to learn indicated by: asking questions, trying to perform skills and interest  Persons(s) to be included in education: patient (P)  Barriers to Learning/Limitations: None  Patient Goal (s): please see evaluation in [facility] Care  Patient Self Reported Health Status: please see paper chart  Rehabilitation Potential: good    Short Term Goals: To be accomplished in 5 treatments:  -Independent in HEP as evidenced on ability to perform at least 5 exercises from HEP using proper form without verbal cuing.   -Pain less than or equal to 5/10 at worst to allow patient to perform ADL's with greater ease  -Demostrate proper posture in order to decrease cervical pain  -Pt will report compliance with icing 1-2x/day in order to decrease inflammation    Long Term Goals: To be accomplished in 3 months:  -AROM cervical spine WNL to allow pt to drive with greater ease.   -Pain 0/10 to allow patient to look up without pain  -Pt will report sleep is improved 25%     Frequency / Duration: Patient to be seen 2 times per week for 3 months. Patient/ Caregiver education and instruction: self care, activity modification and exercises    [x]  Plan of care has been reviewed with PTA    Certification Period: 2/9/2021 -  5/8/21    Janis Sanders. Amee PT, DPT, CMTPT      7/1/9251 3:97 PM  PT License Number: [id number]  _____________________________________________________________________    I certify that the above Therapy Services are being furnished while the patient is under my care. I agree with the treatment plan and certify that this therapy is necessary.     [de-identified] Signature:____________________  Date:____________Time:_________

## 2021-02-09 NOTE — PROGRESS NOTES
PT INITIAL EVALUATION NOTE - [facility] 2-15    Patient Name: Jurgen Gandhi  Date:2021  : 1980  [x]  Patient  Verified  Payor: [employer]ICAL [employer] [facility] / Plan: [facility] MEDICAL [employer] [address] / Product Type: Commerical /    In time:330 P   Out time:430 P   Total Treatment Time (min): 60 (30 eval, 20 timed, 10 modality see below)  Total Timed Codes (min): 20   Visit #:1    Treatment Area: Neck pain [M54.2]    SUBJECTIVE  Any medication changes, allergies to medications, adverse drug reactions, diagnosis change, or new procedure performed?: [] No    [x] Yes (see summary sheet for update)  Chief complaint:     Pt with neck and shoulder pain on right side of neck  Often when neck is irritated it will get locked up  Steroids seem to be the only thing that will allow her to get over the pain. She just finished her steroid pack yesterday  Wonders if it is because she discontinued monthly massages 2 mo ago  Has had three neck flare ups in the past 2 years  Location of pain: right cervical spine up into head and into shoulder  Description of pain: burning/achy at rest, sharp with movement  Pain:   5-6/10 max 3/10 min 3/10 now     Aggravated by: sleeping, pushing, pulling, tilting back, rotating head  Eased by: steroid dose pack, rest, massage  Headaches:     [] Yes   [x] No  Dizziness:     [] Yes    [x] No  Jaw Pain:    [] Yes    [x] No  UE tingling/numbness:   [x] Yes   [] No some at base of skull  UE weakness:    [] Yes    [x] No   Blurred Vision/Double Vision [] Yes    [x] No   Previous treatment: for neck here  Tests/injections: MRI of neck  IMPRESSION:  1. No paraspinal mass demonstrated. If there is a likely palpable abnormality in  the right neck, consider follow-up with ultrasound. 2. Minimal right paracentral disc bulging at C5-6 and mild central disc bulging  at C6-7.     Prior level of function/activity level:   Prior to this exacerbation was able to push/pull without issue, able to rotate head.  Patient goal: \"less pain, more sleep\"  PMH:  EDS, HTN, asthma   Occupation: [employer]  Social: Two kids-10 and 13. . Recently bought a new house in [place]. OBJECTIVE    Observation:  Increased lower cervical flexion, Increased upper cervical ext [x]   Kyphosis  [x] Inc    [] Dec  Cervical Lordosis  [] Inc    [x] Dec    AROM cervical spine (Degrees)   Flexion 55  p! Extension 35 p! R Sidebending 35   L Sidebending 35 p! On right   R Rotation 35 p! Feels blocked   L Rotation 60 p! Feels blocked     UE AROM: grossly WNL shyla UE    Myotomal Testin/5 shyla cervical myotomes      MMT 3-/5 rhomboids, middle trap, serratus anterior, external rotators. Tenderness to palpation: right UT, levator, oblique capitus inferior    Special Tests:         Vertebral Artery:   [] R    [] L    [] +    [x] -         Alar Ligament:  [] R    [] L    [] +    [x] -       Transverse Ligament: [] R    [] L    [] +    [x] -       Spurling's:   [] R    [] L    [] +    [x] -       Distraction:   [] R    [] L    [] +    [x] -       Compression:  [] R    [] L    [] +    [x] -    Joint mobility: hypermobility  c2-c7.  hypomobilty noted t1-t9    OBJECTIVE  [x] Skin assessment post-treatment:  [x]intact []redness- no adverse reaction    []redness  adverse reaction:       20 min Manual Therapy: MFR left UT, levator, oblique capitus inferior. CPA and UPA T1-T9 grade III-IV. Thoracic screw grade V T1-T9. Rationale: decrease pain, increase ROM, increase tissue extensibility and decrease trigger points to improve the patients ability to look upward. 10 min [x]  Ice     []  Heat  Position:supine, shyla LE's supported    Location: cervical spine   Rationale: decrease edema, decrease inflammation, decrease pain and reduce soreness post therapy in order to improve the patients ability to perform ADL's.                  With   [] TE   [x] manual   [] self care    Patient Education: [x] Review HEP    [] Progressed/Changed HEP based on:   [] positioning   [] body mechanics   [] transfers   [x] Ice application- pt advised to ice 10-15 min 1-2 x/day to area in order to dec inflammation  [x] other:  re: mechanism of injury/condition, role of physical therapy, prognosis for recovery, heat vs ice, activity modifications. Pain Level (0-10 scale) post treatment: 2    ASSESSMENT/Changes in Function:     [x]  See Plan of [place].  NIMESH Lubin, TORREYT, CMTPT  PT License Number: [id number]   2/9/2021  3:34 PM

## 2021-02-15 ENCOUNTER — HOSPITAL ENCOUNTER (OUTPATIENT)
Dept: PHYSICAL THERAPY | Age: 41
Discharge: HOME | End: 2021-02-15
Payer: COMMERCIAL

## 2021-02-15 PROCEDURE — 97110 THERAPEUTIC EXERCISES: CPT | Performed by: PHYSICAL THERAPIST

## 2021-02-15 PROCEDURE — 97140 MANUAL THERAPY 1/> REGIONS: CPT | Performed by: PHYSICAL THERAPIST

## 2021-02-15 NOTE — PROGRESS NOTES
PT DAILY TREATMENT NOTE - [facility] 2-15    Patient Name: Stephen Raymundo  Date:2/15/2021  : 1980  [x]  Patient  Verified  Payor: MEDICAL [facility] / Plan: [facility] MEDICAL [place] [address] / Product Type: Commerical /    In time:330 P   Out time:425 P   Total Treatment Time (min): 55  Total Timed Codes (min): 45  Visit #:  2    Treatment Area: Neck pain [M54.2]    SUBJECTIVE  Pain Level (0-10 scale): 1  Any medication changes, allergies to medications, adverse drug reactions, diagnosis change, or new procedure performed?: [x] No    [] Yes (see summary sheet for update)  Subjective functional status/changes:     Much better. OBJECTIVE    Modality rationale: decrease inflammation to improve the patients ability to perform ADL's. Min Type Additional Details   10 [x]  Ice     []  Heat Position: supine, shyla LE's supported    Location: cervical spine     [x] Skin assessment post-treatment:  [x]intact []redness- no adverse reaction    []redness  adverse reaction:     30 min Therapeutic Exercise:  [x] See flow sheet :   Rationale: increase strength, improve coordination, improve balance, and increase proprioception to improve the patients ability to perform ADL's    15 min Manual Therapy:CPA and UPA T1-T9 grade III-IV. Thoracic screw grade V T1-T9. MFR shyla UT, levator, post cervical muscles  Suboccipital release  MFR shyla pec major/minor    Rationale: decrease pain, increase tissue extensibility, and decrease trigger points to improve the patients ability to perform ADL's          With   [] TE   [] manual Patient Education: [x] Review HEP    [] Progressed/Changed HEP based on:   [] positioning   [] body mechanics   [] transfers   [] heat/ice application    [x] other: pt noting inc pain in SIJ when sleeping-advised icing before bed.       Other Objective/Functional Measures:       Pain Level (0-10 scale) post treatment: 0    ASSESSMENT/Changes in Function:     Patient will continue to benefit from skilled PT services to modify and progress therapeutic interventions, address functional mobility deficits, address strength deficits, analyze and address soft tissue restrictions, analyze and cue movement patterns, and analyze and modify body mechanics/ergonomics to attain remaining goals. Progress towards goals / Updated goals:      PLAN  []  Upgrade activities as tolerated     []  Continue plan of care  []  Update interventions per flow sheet       []  Discharge due to:_  []  Other:_      Rl Lubin PT 2/15/2021

## 2021-02-22 ENCOUNTER — HOSPITAL ENCOUNTER (OUTPATIENT)
Dept: PHYSICAL THERAPY | Age: 41
Discharge: HOME | End: 2021-02-22
Payer: COMMERCIAL

## 2021-02-22 PROCEDURE — 97140 MANUAL THERAPY 1/> REGIONS: CPT | Performed by: PHYSICAL THERAPIST

## 2021-02-22 PROCEDURE — 97110 THERAPEUTIC EXERCISES: CPT | Performed by: PHYSICAL THERAPIST

## 2021-02-22 NOTE — PROGRESS NOTES
PT DAILY TREATMENT NOTE - [facility] -15    Patient Name: Stephen Raymundo  Date:2021  : 1980  [x]  Patient  Verified  Payor: MEDICAL [facility] / Plan: [facility] MEDICAL [place] [address] / Product Type: Commerical /    In time:325 P  Out time:425 P   Total Treatment Time (min): 60  Total Timed Codes (min): 50  Visit #:  3    Treatment Area: Neck pain [M54.2]    SUBJECTIVE  Pain Level (0-10 scale): 0  Any medication changes, allergies to medications, adverse drug reactions, diagnosis change, or new procedure performed?: [x] No    [] Yes (see summary sheet for update)  Subjective functional status/changes:     Less neck pain. OBJECTIVE    Modality rationale: decrease inflammation to improve the patients ability to perform ADL's. Min Type Additional Details   10 [x]  Ice     []  Heat Position: supine, shyla LE's supported    Location: cervical spine     [x] Skin assessment post-treatment:  [x]intact []redness- no adverse reaction    []redness  adverse reaction:     25 min Therapeutic Exercise:  [x] See flow sheet :   Rationale: increase strength, improve coordination, improve balance, and increase proprioception to improve the patients ability to perform ADL's    25 min Manual Therapy:CPA and UPA T1-T9 grade III-IV. Thoracic screw grade V T1-T9.     MFR shyla UT, levator, post cervical muscles  Suboccipital release  MFR shyla pec major/minor -omit today   Rationale: decrease pain, increase tissue extensibility, and decrease trigger points to improve the patients ability to perform ADL's          With   [] TE   [] manual Patient Education: [x] Review HEP    [] Progressed/Changed HEP based on:   [] positioning   [] body mechanics   [] transfers   [] heat/ice application    [] other:      Other Objective/Functional Measures:       Pain Level (0-10 scale) post treatment: 0    ASSESSMENT/Changes in Function:     Patient will continue to benefit from skilled PT services to modify and progress therapeutic interventions, address functional mobility deficits, address strength deficits, analyze and address soft tissue restrictions, analyze and cue movement patterns, and analyze and modify body mechanics/ergonomics to attain remaining goals. Progress towards goals / Updated goals:      PLAN  []  Upgrade activities as tolerated     []  Continue plan of care  []  Update interventions per flow sheet       []  Discharge due to:_  []  Other:_      Gabe Lubin, PT 2/22/2021

## 2021-02-24 ENCOUNTER — HOSPITAL ENCOUNTER (OUTPATIENT)
Dept: PHYSICAL THERAPY | Age: 41
Discharge: HOME | End: 2021-02-24
Payer: COMMERCIAL

## 2021-02-24 PROCEDURE — 97140 MANUAL THERAPY 1/> REGIONS: CPT | Performed by: PHYSICAL THERAPIST

## 2021-02-24 PROCEDURE — 97110 THERAPEUTIC EXERCISES: CPT | Performed by: PHYSICAL THERAPIST

## 2021-02-24 NOTE — PROGRESS NOTES
PT DAILY TREATMENT NOTE - [facility] 2-15    Patient Name: Yana Gray  Date:2021  : 1980  [x]  Patient  Verified  Payor: MEDICAL [facility] / Plan: [facility] MEDICAL [place] [address] / Product Type: Commerical /    In time: 405 P  Out time: 500P   Total Treatment Time (min): 55  Total Timed Codes (min): 45  Visit #:  4    Treatment Area: Neck pain [M54.2]    SUBJECTIVE  Pain Level (0-10 scale): 2-3  Any medication changes, allergies to medications, adverse drug reactions, diagnosis change, or new procedure performed?: [x] No    [] Yes (see summary sheet for update)  Subjective functional status/changes:     Patient reports that she has a migraine today she thinks it is probably more related to her menstrual cycle than her neck. OBJECTIVE    Modality rationale: decrease inflammation to improve the patients ability to perform ADL's. Min Type Additional Details   10 [x]  Ice     []  Heat Position: supine, shyla LE's supported    Location: cervical spine     [x] Skin assessment post-treatment:  [x]intact []redness- no adverse reaction    []redness  adverse reaction:     30 min Therapeutic Exercise:  [x] See flow sheet :   Rationale: increase strength, improve coordination, improve balance, and increase proprioception to improve the patients ability to perform ADL's    15 min Manual Therapy:CPA and UPA T1-T9 grade III-IV.   Thoracic screw grade V T1-T9.  - held today   MFR R UT, levator, post cervical muscles  Suboccipital release  MFR shyla pec major/minor -omit today   Rationale: decrease pain, increase tissue extensibility, and decrease trigger points to improve the patients ability to perform ADL's          With   [] TE   [] manual Patient Education: [x] Review HEP    [] Progressed/Changed HEP based on:   [] positioning   [] body mechanics   [] transfers   [] heat/ice application    [] other:      Other Objective/Functional Measures:       Pain Level (0-10 scale) post treatment: \"good\"     ASSESSMENT/Changes in Function:   Patient tolerated treatment well today with decreased pain at end of session. Tolerated progression of exercise program well with good form. Patient will continue to benefit from skilled PT services to modify and progress therapeutic interventions, address functional mobility deficits, address strength deficits, analyze and address soft tissue restrictions, analyze and cue movement patterns, and analyze and modify body mechanics/ergonomics to attain remaining goals.             Progress towards goals / Updated goals:      PLAN  [x]  Upgrade activities as tolerated     [x]  Continue plan of care  []  Update interventions per flow sheet       []  Discharge due to:_  []  Other:_      Shanell Noland PT 2/24/2021

## 2021-03-01 ENCOUNTER — HOSPITAL ENCOUNTER (OUTPATIENT)
Dept: PHYSICAL THERAPY | Age: 41
Discharge: HOME | End: 2021-03-01
Payer: COMMERCIAL

## 2021-03-01 PROCEDURE — 97140 MANUAL THERAPY 1/> REGIONS: CPT | Performed by: PHYSICAL THERAPIST

## 2021-03-01 PROCEDURE — 97110 THERAPEUTIC EXERCISES: CPT | Performed by: PHYSICAL THERAPIST

## 2021-03-01 NOTE — PROGRESS NOTES
PT DAILY TREATMENT NOTE - [facility] 2-15    Patient Name: Alice Ayon  Date:3/1/2021  : 1980  [x]  Patient  Verified  Payor: [employer] [place] / Plan: [facility] MEDICAL [place] [address] / Product Type: Commerical /    In time: 330 P  Out time: 435 P   Total Treatment Time (min): 65  Total Timed Codes (min): 55  Visit #:  5    Treatment Area: Neck pain [M54.2]    SUBJECTIVE  Pain Level (0-10 scale): 0  Any medication changes, allergies to medications, adverse drug reactions, diagnosis change, or new procedure performed?: [x] No    [] Yes (see summary sheet for update)  Subjective functional status/changes:     Pt had a massage on Friday. Was very sore after, but it really seemed to help. OBJECTIVE    Modality rationale: decrease inflammation to improve the patients ability to perform ADL's. Min Type Additional Details   10 [x]  Ice     []  Heat Position: supine, shyla LE's supported    Location: cervical spine     [x] Skin assessment post-treatment:  [x]intact []redness- no adverse reaction    []redness  adverse reaction:     40 min Therapeutic Exercise:  [x] See flow sheet :   Rationale: increase strength, improve coordination, improve balance, and increase proprioception to improve the patients ability to perform ADL's    15 min Manual Therapy:CPA and UPA T1-T9 grade III-IV.   Thoracic screw grade V T1-T9.  - held today   MFR R UT, levator, post cervical muscles  Suboccipital release  MFR shyla pec major/minor -omit today   Rationale: decrease pain, increase tissue extensibility, and decrease trigger points to improve the patients ability to perform ADL's          With   [] TE   [] manual Patient Education: [x] Review HEP    [] Progressed/Changed HEP based on:   [] positioning   [] body mechanics   [] transfers   [] heat/ice application    [] other:      Other Objective/Functional Measures:       Pain Level (0-10 scale) post treatment:0    ASSESSMENT/Changes in Function:       Patient will continue to benefit from skilled PT services to modify and progress therapeutic interventions, address functional mobility deficits, address strength deficits, analyze and address soft tissue restrictions, analyze and cue movement patterns, and analyze and modify body mechanics/ergonomics to attain remaining goals. Progress towards goals / Updated goals:  Pt challenged by addition of ex's as noted on flow. Requires minor cues for inc cervical retraction during ex's but overall rafia well. PLAN  [x]  Upgrade activities as tolerated     [x]  Continue plan of care  []  Update interventions per flow sheet       []  Discharge due to:_  []  Other:_      Mireya Parent.  Amee, PT 3/1/2021

## 2021-03-08 ENCOUNTER — HOSPITAL ENCOUNTER (OUTPATIENT)
Dept: PHYSICAL THERAPY | Age: 41
Discharge: HOME | End: 2021-03-08
Payer: COMMERCIAL

## 2021-03-08 PROCEDURE — 97110 THERAPEUTIC EXERCISES: CPT | Performed by: PHYSICAL THERAPIST

## 2021-03-08 PROCEDURE — 97140 MANUAL THERAPY 1/> REGIONS: CPT | Performed by: PHYSICAL THERAPIST

## 2021-03-08 NOTE — PROGRESS NOTES
PT DAILY TREATMENT NOTE - [facility] 2-15    Patient Name: Callie Payan  Date:3/8/2021  : 1980  [x]  Patient  Verified  Payor: MEDICAL [facility] / Plan: [facility] MEDICAL [place] [address] / Product Type: Commerical /    In time:330 P  Out time: 435 P   Total Treatment Time (min): 65  Total Timed Codes (min): 55  Visit #:  6    Treatment Area: Neck pain [M54.2]    SUBJECTIVE  Pain Level (0-10 scale): 0  Any medication changes, allergies to medications, adverse drug reactions, diagnosis change, or new procedure performed?: [x] No    [] Yes (see summary sheet for update)  Subjective functional status/changes:     Pt turned her head to the side to look at her  and her neck locked up. It started hurting on the left. Was down for the count for a few days, but after resting and icing, was able to return to normal by the weekend. OBJECTIVE    Modality rationale: decrease inflammation to improve the patients ability to perform ADL's. Min Type Additional Details   10 [x]  Ice     []  Heat Position: supine, shyla LE's supported  Location: cervical spine     [x] Skin assessment post-treatment:  [x]intact []redness- no adverse reaction    []redness  adverse reaction:     40 min Therapeutic Exercise:  [x] See flow sheet :   Rationale: increase strength, improve coordination, improve balance, and increase proprioception to improve the patients ability to perform ADL's    15 min Manual Therapy:    CPA and UPA T1-T9 grade III-IV. Thoracic screw grade V T1-T9. CPA c6 grade I-II. Suboccipital release.     Rationale: decrease pain, increase tissue extensibility, and decrease trigger points to improve the patients ability to perform ADL's          With   [] TE   [] manual Patient Education: [x] Review HEP    [] Progressed/Changed HEP based on:   [] positioning   [] body mechanics   [] transfers   [] heat/ice application    [] other:      Other Objective/Functional Measures:   CPA to c6 reproduced left sided neck pain at beginning of assessment. After manual, able to do grade III mobs without reproduction of symptoms. Pain Level (0-10 scale) post treatment:0    ASSESSMENT/Changes in Function:       Patient will continue to benefit from skilled PT services to modify and progress therapeutic interventions, address functional mobility deficits, address strength deficits, analyze and address soft tissue restrictions, analyze and cue movement patterns, and analyze and modify body mechanics/ergonomics to attain remaining goals. Progress towards goals / Updated goals:    PLAN  [x]  Upgrade activities as tolerated     [x]  Continue plan of care  []  Update interventions per flow sheet       []  Discharge due to:_  []  Other:_      Jose Duarte.  Amee, PT 3/8/2021

## 2021-03-11 ENCOUNTER — HOSPITAL ENCOUNTER (OUTPATIENT)
Dept: PHYSICAL THERAPY | Age: 41
Discharge: HOME | End: 2021-03-11
Payer: COMMERCIAL

## 2021-03-11 PROCEDURE — 97110 THERAPEUTIC EXERCISES: CPT | Performed by: PHYSICAL THERAPIST

## 2021-03-11 PROCEDURE — 97140 MANUAL THERAPY 1/> REGIONS: CPT | Performed by: PHYSICAL THERAPIST

## 2021-03-11 NOTE — PROGRESS NOTES
PT DAILY TREATMENT NOTE/PROGRESS NOTE - [facility] 2-15    Patient Name: Jurgen Gandhi  Date:3/11/2021  : 1980  [x]  Patient  Verified  Payor: MEDICAL [facility] / Plan: [facility] MEDICAL [place] [address] / Product Type: Commerical /    In time:430 P   Out time: 540 P   Total Treatment Time (min):70  Total Timed Codes (min): 60  Visit #:  7    Treatment Area: Neck pain [M54.2]    SUBJECTIVE  Pain Level (0-10 scale): 0  Any medication changes, allergies to medications, adverse drug reactions, diagnosis change, or new procedure performed?: [x] No    [] Yes (see summary sheet for update)  Subjective functional status/changes:     Pt had a couple HA days after last time. Unsure if it's related to therapy or not. OBJECTIVE    Modality rationale: decrease inflammation to improve the patients ability to perform ADL's. Min Type Additional Details   10 [x]  Ice     []  Heat Position: supine, shyla LE's supported  Location: cervical spine     [x] Skin assessment post-treatment:  [x]intact []redness- no adverse reaction    []redness  adverse reaction:     45 min Therapeutic Exercise:  [x] See flow sheet :   Rationale: increase strength, improve coordination, improve balance, and increase proprioception to improve the patients ability to perform ADL's    15 min Manual Therapy:    CPA and UPA T1-T9 grade III-IV. Thoracic screw grade V T1-T9. CPA c1-c6 grade I-II. Suboccipital release. Re-assessment    Rationale: decrease pain, increase tissue extensibility, and decrease trigger points to improve the patients ability to perform ADL's          With   [] TE   [] manual Patient Education: [x] Review HEP    [] Progressed/Changed HEP based on:   [] positioning   [] body mechanics   [] transfers   [] heat/ice application    [] other:      Other Objective/Functional Measures:   Observation:  Increased lower cervical flexion, Increased upper cervical ext [x]? Kyphosis                     [x]?  Inc []? Dec  Cervical Lordosis  []? Inc    [x]? Dec     AROM cervical spine (Degrees)   Flexion 65   Extension 50   R Sidebending 35   L Sidebending 35    R Rotation 65   L Rotation 65      UE AROM: grossly WNL shyla UE     Myotomal Testin/5 shyla cervical myotomes        MMT 4+/5 rhomboids, middle trap, serratus anterior, external rotators. Tenderness to palpation: right UT, levator, oblique capitus inferior     Special Tests:         Vertebral Artery:                 []? R    []? L    []? +    [x]? -         Alar Ligament:                  []? R    []? L    []? +    [x]? -       Transverse Ligament:        []? R    []? L    []? +    [x]? -       Spurling's:                          []? R    []? L    []? +    [x]? -       Distraction:                         []? R    []? L    []? +    [x]? -       Compression:                     []? R    []? L    []? +    [x]? -     Joint mobility: hypermobility  c2-c7.  hypomobilty noted t1-t9      Pain Level (0-10 scale) post treatment:0    ASSESSMENT/Changes in Function:          Progress towards goals / Updated goals:  Short Term Goals: To be accomplished in 5 treatments:  -Independent in HEP as evidenced on ability to perform at least 5 exercises from HEP using proper form without verbal cuing. -MET  -Pain less than or equal to 5/10 at worst to allow patient to perform ADL's with greater ease-MET  -Demostrate proper posture in order to decrease cervical pain-MET  -Pt will report compliance with icing 1-2x/day in order to decrease inflammation-MET     Long Term Goals: To be accomplished in 3 months:  -AROM cervical spine WNL to allow pt to drive with greater ease. -MET  -Pain 0/10 to allow patient to look up without pain-PROGRESSING  -Pt will report sleep is improved 25% -PROGRESSING      Pt with improved AROM cervical spine, improved strength. Dec frequency and intensity of HA.   Will benefit from continued therapy to continue strength of scap stabilizers and cervical stability to allow pt to reach all LTG    PLAN  [x]  Upgrade activities as tolerated     [x]  Continue plan of care  []  Update interventions per flow sheet       []  Discharge due to:_  []  Other:_      Telly Galvez.  Amee, PT 3/11/2021

## 2021-03-15 ENCOUNTER — HOSPITAL ENCOUNTER (OUTPATIENT)
Dept: PHYSICAL THERAPY | Age: 41
Discharge: HOME | End: 2021-03-15
Payer: COMMERCIAL

## 2021-03-15 PROCEDURE — 97110 THERAPEUTIC EXERCISES: CPT | Performed by: PHYSICAL THERAPIST

## 2021-03-15 PROCEDURE — 97140 MANUAL THERAPY 1/> REGIONS: CPT | Performed by: PHYSICAL THERAPIST

## 2021-03-15 NOTE — PROGRESS NOTES
PT DAILY TREATMENT NOTE - [facility] 2-15    Patient Name: Louise Ingram  Date:3/15/2021  : 1980  [x]  Patient  Verified  Payor: MEDICAL [facility] / Plan: [facility] MEDICAL [place] [address] / Product Type: Commerical /    In time: 325 P  Out time: 430 P   Total Treatment Time (min): 65   Total Timed Codes (min): 55  Visit #:  8    Treatment Area: Neck pain [M54.2]    SUBJECTIVE  Pain Level (0-10 scale): 0  Any medication changes, allergies to medications, adverse drug reactions, diagnosis change, or new procedure performed?: [x] No    [] Yes (see summary sheet for update)  Subjective functional status/changes:     Pt had HA after last time, but overall feeling better. Pt has noticed she can adjust how she sleeps so her neck feels better. OBJECTIVE    Modality rationale: decrease inflammation to improve the patients ability to perform ADL's. Min Type Additional Details   10 [x]  Ice     []  Heat Position: supine, shyla LE's supported  Location: cervical spine     [x] Skin assessment post-treatment:  [x]intact []redness- no adverse reaction    []redness  adverse reaction:     40 min Therapeutic Exercise:  [x] See flow sheet :   Rationale: increase strength, improve coordination, improve balance, and increase proprioception to improve the patients ability to perform ADL's    15 min Manual Therapy:  CPA and UPA T1-T9 grade III-IV. Thoracic screw grade V T1-T9. Suboccipital release.     Rationale: decrease pain, increase tissue extensibility, and decrease trigger points to improve the patients ability to perform ADL's          With   [] TE   [] manual Patient Education: [x] Review HEP    [] Progressed/Changed HEP based on:   [] positioning   [] body mechanics   [] transfers   [] heat/ice application    [] other:      Other Objective/Functional Measures:        Pain Level (0-10 scale) post treatment:0    ASSESSMENT/Changes in Function:       Patient will continue to benefit from skilled PT services to modify and progress therapeutic interventions, address functional mobility deficits, address strength deficits, analyze and address soft tissue restrictions, analyze and cue movement patterns, and analyze and modify body mechanics/ergonomics to attain remaining goals. Progress towards goals / Updated goals:    PLAN  [x]  Upgrade activities as tolerated     [x]  Continue plan of care  []  Update interventions per flow sheet       []  Discharge due to:_  []  Other:_      Scott Lubin, PT 3/15/2021

## 2021-03-18 ENCOUNTER — APPOINTMENT (OUTPATIENT)
Dept: PHYSICAL THERAPY | Age: 41
End: 2021-03-18
Payer: COMMERCIAL

## 2021-03-22 ENCOUNTER — HOSPITAL ENCOUNTER (OUTPATIENT)
Dept: PHYSICAL THERAPY | Age: 41
Discharge: HOME | End: 2021-03-22
Payer: COMMERCIAL

## 2021-03-22 PROCEDURE — 97140 MANUAL THERAPY 1/> REGIONS: CPT | Performed by: PHYSICAL THERAPIST

## 2021-03-22 PROCEDURE — 97110 THERAPEUTIC EXERCISES: CPT | Performed by: PHYSICAL THERAPIST

## 2021-03-22 NOTE — PROGRESS NOTES
PT DAILY TREATMENT NOTE - [facility] 2-15    Patient Name: Afshin Grey  Date:3/22/2021  : 1980  [x]  Patient  Verified  Payor: [address] / Plan: [facility] MEDICAL [place] [address] / Product Type: Commerical /    In time: 240 P  Out time: 350 P   Total Treatment Time (min):70  Total Timed Codes (min): 60  Visit #:  9    Treatment Area: Neck pain [M54.2]    SUBJECTIVE  Pain Level (0-10 scale): 2  Any medication changes, allergies to medications, adverse drug reactions, diagnosis change, or new procedure performed?: [x] No    [] Yes (see summary sheet for update)  Subjective functional status/changes:     Pt had increased soreness after doing puzzles. OBJECTIVE    Modality rationale: decrease inflammation to improve the patients ability to perform ADL's. Min Type Additional Details   10 [x]  Ice     []  Heat Position: supine, shyla LE's supported  Location: cervical spine     [x] Skin assessment post-treatment:  [x]intact []redness- no adverse reaction    []redness  adverse reaction:     45 min Therapeutic Exercise:  [x] See flow sheet :   Rationale: increase strength, improve coordination, improve balance, and increase proprioception to improve the patients ability to perform ADL's    15 min Manual Therapy:  CPA and UPA T1-T9 grade III-IV. Thoracic screw grade V T1-T9. Suboccipital release. MFR UT/levator. Manual isometrics flexion, SB, ext at wall. Rationale: decrease pain, increase tissue extensibility, and decrease trigger points to improve the patients ability to perform ADL's          With   [] TE   [] manual Patient Education: [x] Review HEP    [] Progressed/Changed HEP based on:   [] positioning   [] body mechanics   [] transfers   [] heat/ice application    [x] other: of ex's, advised pt focus on isometrics.       Other Objective/Functional Measures:        Pain Level (0-10 scale) post treatment:0    ASSESSMENT/Changes in Function:       Patient will continue to benefit from skilled PT services to modify and progress therapeutic interventions, address functional mobility deficits, address strength deficits, analyze and address soft tissue restrictions, analyze and cue movement patterns, and analyze and modify body mechanics/ergonomics to attain remaining goals. Progress towards goals / Updated goals:    PLAN  [x]  Upgrade activities as tolerated     [x]  Continue plan of care  []  Update interventions per flow sheet       []  Discharge due to:_  [x]  Other:_   Continue stability progression. Pt getting massage later this week. Jeannine Flores.  Amee, PT 3/22/2021

## 2021-04-01 ENCOUNTER — HOSPITAL ENCOUNTER (OUTPATIENT)
Dept: PHYSICAL THERAPY | Age: 41
Discharge: HOME | End: 2021-04-01
Payer: COMMERCIAL

## 2021-04-01 PROCEDURE — 97110 THERAPEUTIC EXERCISES: CPT | Performed by: PHYSICAL THERAPIST

## 2021-04-01 PROCEDURE — 97140 MANUAL THERAPY 1/> REGIONS: CPT | Performed by: PHYSICAL THERAPIST

## 2021-04-01 NOTE — PROGRESS NOTES
PT DAILY TREATMENT NOTE - [facility] 2-15    Patient Name: Ama Smith  Date:2021  : 1980  [x]  Patient  Verified  Payor: MEDICAL [place] / Plan: [facility] MEDICAL [employer] [address] / Product Type: Commerical /    In time: 345 P  Out time: 445 P  Total Treatment Time (min)60  Total Timed Codes (min): 60  Visit #:  10    Treatment Area: Neck pain [M54.2]    SUBJECTIVE  Pain Level (0-10 scale)0  Any medication changes, allergies to medications, adverse drug reactions, diagnosis change, or new procedure performed?: [x] No    [] Yes (see summary sheet for update)  Subjective functional status/changes:     Pt reports her neck has not been bothering her at all. OBJECTIVE    Modality rationale: decrease inflammation to improve the patients ability to perform ADL's. Min Type Additional Details   To go [x]  Ice     []  Heat Position: supine, shyla LE's supported  Location: cervical spine     [x] Skin assessment post-treatment:  [x]intact []redness- no adverse reaction    []redness  adverse reaction:     45 min Therapeutic Exercise:  [x] See flow sheet :   Rationale: increase strength, improve coordination, improve balance, and increase proprioception to improve the patients ability to perform ADL's    15 min Manual Therapy:  CPA and UPA T1-T9 grade III-IV. Thoracic screw grade V T1-T9. Suboccipital release. MFR UT/levator. Rationale: decrease pain, increase tissue extensibility, and decrease trigger points to improve the patients ability to perform ADL's          With   [] TE   [] manual Patient Education: [x] Review HEP    [] Progressed/Changed HEP based on:   [] positioning   [] body mechanics   [] transfers   [] heat/ice application    [x] other: emailed updated HEP.       Other Objective/Functional Measures:        Pain Level (0-10 scale) post treatment:0    ASSESSMENT/Changes in Function:       Patient will continue to benefit from skilled PT services to modify and progress therapeutic interventions, address functional mobility deficits, address strength deficits, analyze and address soft tissue restrictions, analyze and cue movement patterns, and analyze and modify body mechanics/ergonomics to attain remaining goals. Progress towards goals / Updated goals:  Improved soft tissue mobility noted cervical musculature. PLAN  [x]  Upgrade activities as tolerated     [x]  Continue plan of care  []  Update interventions per flow sheet       []  Discharge due to:_  [x]  Other:_   Pt to skip next week for spring break. To follow up week after. If pt feels better, pt to email us and we can D/C her chart. Kandace Holguin.  Amee, PT 4/1/2021

## 2021-04-08 ENCOUNTER — APPOINTMENT (OUTPATIENT)
Dept: PHYSICAL THERAPY | Age: 41
End: 2021-04-08
Payer: COMMERCIAL

## 2021-04-29 DIAGNOSIS — G44.219 EPISODIC TENSION-TYPE HEADACHE, NOT INTRACTABLE: ICD-10-CM

## 2021-04-29 RX ORDER — RIZATRIPTAN BENZOATE 10 MG/1
TABLET ORAL
Qty: 9 TAB | Refills: 11 | Status: SHIPPED | OUTPATIENT
Start: 2021-04-29 | End: 2022-05-24 | Stop reason: SDUPTHER

## 2021-06-25 ENCOUNTER — VIRTUAL VISIT (OUTPATIENT)
Dept: FAMILY MEDICINE CLINIC | Age: 41
End: 2021-06-25
Payer: COMMERCIAL

## 2021-06-25 DIAGNOSIS — M62.838 NECK MUSCLE SPASM: Primary | ICD-10-CM

## 2021-06-25 DIAGNOSIS — M54.2 NECK PAIN ON RIGHT SIDE: ICD-10-CM

## 2021-06-25 PROCEDURE — 99213 OFFICE O/P EST LOW 20 MIN: CPT | Performed by: FAMILY MEDICINE

## 2021-06-25 RX ORDER — DROSPIRENONE 4 MG/1
TABLET, FILM COATED ORAL
COMMUNITY

## 2021-06-25 RX ORDER — PANTOPRAZOLE SODIUM 40 MG/1
TABLET, DELAYED RELEASE ORAL
COMMUNITY

## 2021-06-25 RX ORDER — PREDNISONE 10 MG/1
TABLET ORAL
Qty: 42 TABLET | Refills: 0 | Status: SHIPPED | OUTPATIENT
Start: 2021-06-25

## 2021-06-25 RX ORDER — MAGNESIUM 200 MG
1 TABLET ORAL DAILY
COMMUNITY

## 2021-06-25 NOTE — PROGRESS NOTES
1. Have you been to the ER, urgent care clinic since your last visit? Hospitalized since your last visit? Yes. [facility] 2 Weeks ago    2. Have you seen or consulted any other health care providers outside of the [address] [place] [address] since your last visit? Include any pap smears or colon screening.  No     Health Maintenance Due   Topic Date Due    Hepatitis C Screening  Never done    COVID-19 Vaccine (1) Never done    DTaP/Tdap/Td series (1 - Tdap) Never done    PAP AKA CERVICAL CYTOLOGY  Never done

## 2021-06-25 NOTE — PROGRESS NOTES
Chief Complaint   Patient presents with    Neck Pain     X 2 WEEKS. PRESCRIBED STEROID. UNABLE TO TURN/TILT HEAD. DENIES INJURIES. Pt was seen via virtual video visit. Patient has been struggling with recurrent episodes of neck pain over the previous year. Patient reports that she is currently in the middle of a flare. She went to urgent care, was given prednisone and muscle relaxer. Patient reports that she was feeling almost back to normal, and the pain resumed when prednisone was complete. Patient reports that she wanted to work this morning but was sent home as she was unable to do her job due to pain and limited range of motion in her neck. Patient reports that she only experiences pain relief with laying flat. Patient had an MRI last August, only minimal changes were noted. Jeffery Vasquez is a 45 y.o. female who was seen by synchronous (real-time) audio-video technology on 6/25/2021 for Neck Pain (X 2 WEEKS. PRESCRIBED STEROID. UNABLE TO TURN/TILT HEAD. DENIES INJURIES.)        Assessment & Plan:   Diagnoses and all orders for this visit:    1. Neck muscle spasm  -     REFERRAL TO ORTHOPEDICS  -     predniSONE (DELTASONE) 10 mg tablet; Please take 60mg x 2 days, 50mg x 2 days, 40mg x 2 days, 30mg x 2 days, 20mg x 2 days, 10mg x 2 days    2. Neck pain on right side  -     REFERRAL TO ORTHOPEDICS  -     predniSONE (DELTASONE) 10 mg tablet; Please take 60mg x 2 days, 50mg x 2 days, 40mg x 2 days, 30mg x 2 days, 20mg x 2 days, 10mg x 2 days    Referred patient to Dr. Mallory Willoughby due to recurrent episodes of neck pain with escalating severity. Started patient back on higher dose longer prednisone taper. Advised patient to continue muscle relaxers, heat and rest as able. Subjective:       Prior to Admission medications    Medication Sig Start Date End Date Taking? Authorizing Provider   magnesium 200 mg tab Take 1 Tablet by mouth daily.    Yes Provider, Historical   predniSONE (David Fiore) 10 mg tablet Please take 60mg x 2 days, 50mg x 2 days, 40mg x 2 days, 30mg x 2 days, 20mg x 2 days, 10mg x 2 days 6/25/21  Yes Jennifer Rehman MD   rizatriptan (MAXALT) 10 mg tablet May repeat in 2 hours if needed 4/29/21  Yes Ai Wagner MD   cyclobenzaprine (FLEXERIL) 10 mg tablet Take 1 Tab by mouth three (3) times daily as needed for Muscle Spasm(s). Patient taking differently: Take 10 mg by mouth daily as needed for Muscle Spasm(s). 2/1/21  Yes Jennifer Rehman MD   ibuprofen (MOTRIN) 600 mg tablet Take 1 Tab by mouth every six (6) hours as needed for Pain. 12/25/20  Yes Juan Michaud MD   drospirenone, contraceptive, (Slynd) 4 mg (28) tab Slynd 4 mg (28) tablet   TAKE 1 TABLET BY MOUTH EVERY DAY  Patient not taking: Reported on 6/25/2021    Provider, Historical   pantoprazole (Protonix) 40 mg tablet Protonix 40 mg tablet,delayed release   Take 1 tablet every day by oral route. Patient not taking: Reported on 6/25/2021    Provider, Historical   methylPREDNISolone (MEDROL DOSEPACK) 4 mg tablet Follow dose pack instructions  Patient not taking: Reported on 6/25/2021 2/1/21 6/25/21  Jennifer Rehman MD   diclofenac EC (VOLTAREN) 75 mg EC tablet Take 1 Tab by mouth two (2) times daily as needed for Pain. Patient not taking: Reported on 6/25/2021 1/4/21   Ai Wagner MD   methocarbamoL (ROBAXIN) 500 mg tablet Take 1 Tab by mouth three (3) times daily as needed for Muscle Spasm(s). Patient not taking: Reported on 6/25/2021 7/13/20   Ai Wagner MD     Allergies   Allergen Reactions    Lisinopril Swelling    Morphine Other (comments)     Chest pain       Review of Systems   Constitutional: Negative for chills, fever and malaise/fatigue. HENT: Negative for congestion and sore throat. Respiratory: Negative for cough and shortness of breath. Cardiovascular: Negative for chest pain and palpitations.    Gastrointestinal: Negative for abdominal pain, heartburn, nausea and vomiting. Genitourinary: Negative for dysuria and urgency. Musculoskeletal: Positive for myalgias and neck pain. Negative for joint pain. Neurological: Negative for dizziness, tingling and headaches. All other systems reviewed and are negative.       Objective:     Patient-Reported Vitals 2/1/2021   Patient-Reported Weight 165lbs   Patient-Reported Height -   Patient-Reported Pulse -   Patient-Reported Systolic  -   Patient-Reported Diastolic -        [INSTRUCTIONS:  \"[x]\" Indicates a positive item  \"[]\" Indicates a negative item  -- DELETE ALL ITEMS NOT EXAMINED]    Constitutional: [x] Appears well-developed and well-nourished [] No apparent distress      [x] Abnormal - appears uncomfortable    Mental status: [x] Alert and awake  [x] Oriented to person/place/time [x] Able to follow commands    [] Abnormal -     Eyes:   EOM    [x]  Normal    [] Abnormal -   Sclera  [x]  Normal    [] Abnormal -          Discharge [x]  None visible   [] Abnormal -     HENT: [x] Normocephalic, atraumatic  [] Abnormal -   [x] Mouth/Throat: Mucous membranes are moist    External Ears [x] Normal  [] Abnormal -    Neck: [x] No visualized mass [x] Abnormal - limited ROM    Pulmonary/Chest: [x] Respiratory effort normal   [x] No visualized signs of difficulty breathing or respiratory distress        [] Abnormal -      Musculoskeletal:   [] Normal gait with no signs of ataxia         [] Normal range of motion of neck        [x] Abnormal - limited ROM in neck    Neurological:        [x] No Facial Asymmetry (Cranial nerve 7 motor function) (limited exam due to video visit)          [x] No gaze palsy        [] Abnormal -          Skin:        [x] No significant exanthematous lesions or discoloration noted on facial skin         [] Abnormal -            Psychiatric:       [x] Normal Affect [] Abnormal -        [x] No Hallucinations    Other pertinent observable physical exam findings:-        We discussed the expected course, resolution and complications of the diagnosis(es) in detail. Medication risks, benefits, costs, interactions, and alternatives were discussed as indicated. I advised her to contact the office if her condition worsens, changes or fails to improve as anticipated. She expressed understanding with the diagnosis(es) and plan. Stephanie Gill, was evaluated through a synchronous (real-time) audio-video encounter. The patient (or guardian if applicable) is aware that this is a billable service. Verbal consent to proceed has been obtained within the past 12 months. The visit was conducted pursuant to the emergency declaration under the [address] [place] [address], [address] [place] [address] authority and the [employer] General Act. Patient identification was verified, and a caregiver was present when appropriate. The patient was located in a state where the provider was credentialed to provide care.       Danielle Trujillo MD

## 2021-06-30 LAB
CHOLEST SERPL-MCNC: 238 MG/DL
GLUCOSE SERPL-MCNC: 92 MG/DL (ref 65–100)
HDLC SERPL-MCNC: 77 MG/DL
LDLC SERPL CALC-MCNC: 141 MG/DL (ref 0–100)
TRIGL SERPL-MCNC: 100 MG/DL (ref ?–150)

## 2021-07-05 ENCOUNTER — TRANSCRIBE ORDER (OUTPATIENT)
Dept: SCHEDULING | Age: 41
End: 2021-07-05

## 2021-07-05 DIAGNOSIS — M54.2 NECK PAIN: Primary | ICD-10-CM

## 2021-07-05 DIAGNOSIS — M50.30 DDD (DEGENERATIVE DISC DISEASE), CERVICAL: ICD-10-CM

## 2021-07-16 ENCOUNTER — HOSPITAL ENCOUNTER (OUTPATIENT)
Dept: MRI IMAGING | Age: 41
Discharge: HOME | End: 2021-07-16
Payer: COMMERCIAL

## 2021-07-16 DIAGNOSIS — M50.30 DDD (DEGENERATIVE DISC DISEASE), CERVICAL: ICD-10-CM

## 2021-07-16 DIAGNOSIS — M54.2 NECK PAIN: ICD-10-CM

## 2021-07-16 PROCEDURE — 72141 MRI NECK SPINE W/O DYE: CPT | Performed by: PHYSICIAN ASSISTANT

## 2022-04-20 ENCOUNTER — TRANSCRIBE ORDER (OUTPATIENT)
Dept: SCHEDULING | Age: 42
End: 2022-04-20

## 2022-04-20 DIAGNOSIS — Z12.31 SCREENING MAMMOGRAM FOR HIGH-RISK PATIENT: Primary | ICD-10-CM

## 2022-04-25 ENCOUNTER — HOSPITAL ENCOUNTER (OUTPATIENT)
Dept: MAMMOGRAPHY | Age: 42
Discharge: HOME | End: 2022-04-25
Attending: SPECIALIST
Payer: COMMERCIAL

## 2022-04-25 DIAGNOSIS — Z12.31 SCREENING MAMMOGRAM FOR HIGH-RISK PATIENT: ICD-10-CM

## 2022-04-25 PROCEDURE — 77067 SCR MAMMO BI INCL CAD: CPT

## 2022-05-11 ENCOUNTER — HOSPITAL ENCOUNTER (OUTPATIENT)
Dept: MAMMOGRAPHY | Age: 42
Discharge: HOME | End: 2022-05-11
Attending: SPECIALIST
Payer: COMMERCIAL

## 2022-05-11 DIAGNOSIS — R92.8 ABNORMAL MAMMOGRAM OF RIGHT BREAST: ICD-10-CM

## 2022-05-11 PROCEDURE — 77061 BREAST TOMOSYNTHESIS UNI: CPT

## 2022-05-24 DIAGNOSIS — G44.219 EPISODIC TENSION-TYPE HEADACHE, NOT INTRACTABLE: ICD-10-CM

## 2022-05-24 RX ORDER — RIZATRIPTAN BENZOATE 10 MG/1
TABLET ORAL
Qty: 9 TABLET | Refills: 11 | Status: SHIPPED | OUTPATIENT
Start: 2022-05-24 | End: 2022-05-26 | Stop reason: SDUPTHER

## 2022-05-26 DIAGNOSIS — G44.219 EPISODIC TENSION-TYPE HEADACHE, NOT INTRACTABLE: ICD-10-CM

## 2022-05-26 RX ORDER — RIZATRIPTAN BENZOATE 10 MG/1
TABLET ORAL
Qty: 9 TABLET | Refills: 11 | Status: SHIPPED | OUTPATIENT
Start: 2022-05-26

## 2022-05-27 ENCOUNTER — TELEPHONE (OUTPATIENT)
Dept: FAMILY MEDICINE CLINIC | Age: 42
End: 2022-05-27

## 2022-05-27 NOTE — TELEPHONE ENCOUNTER
[facility] [address] needs clarification on a rizatriptan refill.     CB: [phone]

## 2023-01-07 ENCOUNTER — APPOINTMENT (OUTPATIENT)
Dept: CT IMAGING | Age: 43
End: 2023-01-07
Attending: EMERGENCY MEDICINE
Payer: COMMERCIAL

## 2023-01-07 ENCOUNTER — APPOINTMENT (OUTPATIENT)
Dept: GENERAL RADIOLOGY | Age: 43
End: 2023-01-07
Attending: EMERGENCY MEDICINE
Payer: COMMERCIAL

## 2023-01-07 ENCOUNTER — HOSPITAL ENCOUNTER (EMERGENCY)
Age: 43
Discharge: HOME | End: 2023-01-07
Attending: EMERGENCY MEDICINE
Payer: COMMERCIAL

## 2023-01-07 VITALS
HEIGHT: 62 IN | WEIGHT: 170 LBS | HEART RATE: 78 BPM | SYSTOLIC BLOOD PRESSURE: 136 MMHG | OXYGEN SATURATION: 100 % | BODY MASS INDEX: 31.28 KG/M2 | TEMPERATURE: 97.9 F | RESPIRATION RATE: 16 BRPM | DIASTOLIC BLOOD PRESSURE: 95 MMHG

## 2023-01-07 DIAGNOSIS — R07.89 RIGHT-SIDED CHEST WALL PAIN: Primary | ICD-10-CM

## 2023-01-07 DIAGNOSIS — M94.0 COSTOCHONDRITIS: ICD-10-CM

## 2023-01-07 LAB
ALBUMIN SERPL-MCNC: 3.7 G/DL (ref 3.5–5)
ALBUMIN/GLOB SERPL: 1.2 {RATIO} (ref 1.1–2.2)
ALP SERPL-CCNC: 85 U/L (ref 45–117)
ALT SERPL-CCNC: 23 U/L (ref 12–78)
ANION GAP SERPL CALC-SCNC: 5 MMOL/L (ref 5–15)
AST SERPL-CCNC: 14 U/L (ref 15–37)
ATRIAL RATE: 82 BPM
BASOPHILS # BLD: 0 K/UL (ref 0–0.1)
BASOPHILS NFR BLD: 0 % (ref 0–1)
BILIRUB SERPL-MCNC: 0.3 MG/DL (ref 0.2–1)
BNP SERPL-MCNC: 41 PG/ML
BUN SERPL-MCNC: 10 MG/DL (ref 6–20)
BUN/CREAT SERPL: 13 (ref 12–20)
CALCIUM SERPL-MCNC: 8.9 MG/DL (ref 8.5–10.1)
CALCULATED P AXIS, ECG09: 64 DEGREES
CALCULATED R AXIS, ECG10: 54 DEGREES
CALCULATED T AXIS, ECG11: 49 DEGREES
CHLORIDE SERPL-SCNC: 103 MMOL/L (ref 97–108)
CO2 SERPL-SCNC: 30 MMOL/L (ref 21–32)
CREAT SERPL-MCNC: 0.79 MG/DL (ref 0.55–1.02)
DIAGNOSIS, 93000: NORMAL
DIFFERENTIAL METHOD BLD: ABNORMAL
EOSINOPHIL # BLD: 0.1 K/UL (ref 0–0.4)
EOSINOPHIL NFR BLD: 1 % (ref 0–7)
ERYTHROCYTE [DISTWIDTH] IN BLOOD BY AUTOMATED COUNT: 12.2 % (ref 11.5–14.5)
GLOBULIN SER CALC-MCNC: 3.2 G/DL (ref 2–4)
GLUCOSE SERPL-MCNC: 117 MG/DL (ref 65–100)
HCG SERPL QL: NEGATIVE
HCT VFR BLD AUTO: 40.9 % (ref 35–47)
HGB BLD-MCNC: 13.4 G/DL (ref 11.5–16)
IMM GRANULOCYTES # BLD AUTO: 0 K/UL (ref 0–0.04)
IMM GRANULOCYTES NFR BLD AUTO: 0 % (ref 0–0.5)
LYMPHOCYTES # BLD: 1.2 K/UL (ref 0.8–3.5)
LYMPHOCYTES NFR BLD: 17 % (ref 12–49)
MCH RBC QN AUTO: 28.7 PG (ref 26–34)
MCHC RBC AUTO-ENTMCNC: 32.8 G/DL (ref 30–36.5)
MCV RBC AUTO: 87.6 FL (ref 80–99)
MONOCYTES # BLD: 0.6 K/UL (ref 0–1)
MONOCYTES NFR BLD: 8 % (ref 5–13)
NEUTS SEG # BLD: 5.1 K/UL (ref 1.8–8)
NEUTS SEG NFR BLD: 74 % (ref 32–75)
NRBC # BLD: 0 K/UL (ref 0–0.01)
NRBC BLD-RTO: 0 PER 100 WBC
P-R INTERVAL, ECG05: 144 MS
PLATELET # BLD AUTO: 226 K/UL (ref 150–400)
PMV BLD AUTO: 8.8 FL (ref 8.9–12.9)
POTASSIUM SERPL-SCNC: 3.5 MMOL/L (ref 3.5–5.1)
PROT SERPL-MCNC: 6.9 G/DL (ref 6.4–8.2)
Q-T INTERVAL, ECG07: 356 MS
QRS DURATION, ECG06: 80 MS
QTC CALCULATION (BEZET), ECG08: 415 MS
RBC # BLD AUTO: 4.67 M/UL (ref 3.8–5.2)
SODIUM SERPL-SCNC: 138 MMOL/L (ref 136–145)
TROPONIN-HIGH SENSITIVITY: <4 NG/L (ref 0–51)
VENTRICULAR RATE, ECG03: 82 BPM
WBC # BLD AUTO: 7 K/UL (ref 3.6–11)

## 2023-01-07 PROCEDURE — 36415 COLL VENOUS BLD VENIPUNCTURE: CPT

## 2023-01-07 PROCEDURE — 84703 CHORIONIC GONADOTROPIN ASSAY: CPT

## 2023-01-07 PROCEDURE — 71275 CT ANGIOGRAPHY CHEST: CPT

## 2023-01-07 PROCEDURE — 99285 EMERGENCY DEPT VISIT HI MDM: CPT

## 2023-01-07 PROCEDURE — 80053 COMPREHEN METABOLIC PANEL: CPT

## 2023-01-07 PROCEDURE — 74011000250 HC RX REV CODE- 250: Performed by: EMERGENCY MEDICINE

## 2023-01-07 PROCEDURE — 74011250636 HC RX REV CODE- 250/636: Performed by: EMERGENCY MEDICINE

## 2023-01-07 PROCEDURE — 85025 COMPLETE CBC W/AUTO DIFF WBC: CPT

## 2023-01-07 PROCEDURE — 74011000636 HC RX REV CODE- 636: Performed by: EMERGENCY MEDICINE

## 2023-01-07 PROCEDURE — 71046 X-RAY EXAM CHEST 2 VIEWS: CPT

## 2023-01-07 PROCEDURE — 83880 ASSAY OF NATRIURETIC PEPTIDE: CPT

## 2023-01-07 PROCEDURE — 96374 THER/PROPH/DIAG INJ IV PUSH: CPT

## 2023-01-07 PROCEDURE — 84484 ASSAY OF TROPONIN QUANT: CPT

## 2023-01-07 PROCEDURE — 96375 TX/PRO/DX INJ NEW DRUG ADDON: CPT

## 2023-01-07 RX ORDER — LIDOCAINE 4 G/100G
PATCH TOPICAL
Qty: 10 PATCH | Refills: 0 | Status: SHIPPED | OUTPATIENT
Start: 2023-01-07

## 2023-01-07 RX ORDER — FENTANYL CITRATE 50 UG/ML
50 INJECTION, SOLUTION INTRAMUSCULAR; INTRAVENOUS ONCE
Status: COMPLETED | OUTPATIENT
Start: 2023-01-07 | End: 2023-01-07

## 2023-01-07 RX ORDER — IBUPROFEN 600 MG/1
600 TABLET ORAL
Qty: 20 TABLET | Refills: 0 | Status: SHIPPED | OUTPATIENT
Start: 2023-01-07

## 2023-01-07 RX ORDER — LIDOCAINE 4 G/100G
1 PATCH TOPICAL
Status: DISCONTINUED | OUTPATIENT
Start: 2023-01-07 | End: 2023-01-07 | Stop reason: HOSPADM

## 2023-01-07 RX ORDER — KETOROLAC TROMETHAMINE 30 MG/ML
15 INJECTION, SOLUTION INTRAMUSCULAR; INTRAVENOUS
Status: COMPLETED | OUTPATIENT
Start: 2023-01-07 | End: 2023-01-07

## 2023-01-07 RX ADMIN — IOPAMIDOL 100 ML: 755 INJECTION, SOLUTION INTRAVENOUS at 14:19

## 2023-01-07 RX ADMIN — FENTANYL CITRATE 50 MCG: 50 INJECTION, SOLUTION INTRAMUSCULAR; INTRAVENOUS at 14:06

## 2023-01-07 RX ADMIN — KETOROLAC TROMETHAMINE 15 MG: 30 INJECTION, SOLUTION INTRAMUSCULAR at 14:06

## 2023-01-07 NOTE — DISCHARGE INSTRUCTIONS
Blood work,You were evaluated in the emergency department for right chest pain. Your examination was reassuring as was your work-up including blood work, EKG, and CT scan. It will be important for you to follow-up with your primary care physician in 2-3 days. If you develop worsening symptoms such as worsening chest pain or shortness of breath, please return to the emergency department immediately. You can use Tylenol 1000 mg every 6 hours as needed for pain, please do not exceed 4000 mg in a single day. You can use ibuprofen 600 mg every 6 hours as needed for pain, please do not exceed 2400 milligrams in a single day. You can use lidocaine patches (over-the-counter) in the affected area every 12 hours as needed for pain. Please use heat pads and stretching to help alleviate the pain.

## 2023-01-07 NOTE — ED PROVIDER NOTES
\[facility]\"" EMERGENCY DEPT  EMERGENCY DEPARTMENT ENCOUNTER       Pt Name: Merlyn Bartholomew  MRN: [id number]  [place] 1980  Date of evaluation: 1/7/2023  Provider: Isaac Coleman MD   PCP: Aron Conner MD  Note Started: 1:49 PM 1/7/23     CHIEF COMPLAINT       Chief Complaint   Patient presents with    Chest Pain        HISTORY OF PRESENT ILLNESS: 1 or more elements      History From: Patient, , History limited by: No limitations     Merlyn Bartholomew is a 40 y.o. female with history of WPW, A. fib status post ablation, Fang-Danlos syndrome who presents with chief complaint of right chest pain. The symptoms have been present over the past 2 weeks, progressively worsening. Used to be intermittent now it is constant described as a sharp pleuritic pain located to right upper chest with radiation straight through into the right scapula. She went to [facility] who sent her to the ED for CTPA to rule out PE. She states that she did have a viral illness with cough over the past 2 weeks which preceded this. She believes that it is a muscular situation as when she was coughing she felt a pop in the right upper chest.     Nursing Notes were all reviewed and agreed with or any disagreements were addressed in the HPI. REVIEW OF SYSTEMS        Positives and Pertinent negatives as per HPI.     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 Unspecified adverse effect of anesthesia     with C- section the spinal did not take and had to be put to sleep    Palacios-Parkinson-White syndrome     Marie-Parkinson-White syndrome        Past Surgical History:  Past Surgical History:   Procedure Laterality Date    COLONOSCOPY N/A 2019    COLONOSCOPY performed by Jordana Yatse MD at [facility] ENDOSCOPY    [address]      c section x 2    HX CHOLECYSTECTOMY      HX HEENT  As Child    \"tubes in each ear\" as child    HX SVT ABLATION      ,     HX TONSILLECTOMY  2007    tonsils    [place]  DELIVERY ONLY         Family History:  Family History   Problem Relation Age of Onset    Cancer Mother 40        ovarian    Migraines Mother     Heart Disease Father     Migraines Father     Cancer Maternal Grandfather         lung    Psychiatric Disorder Maternal Grandmother     Breast Cancer Maternal Grandmother     Parkinson's Disease Paternal Grandfather     Migraines Sister     Cancer Paternal Grandmother         breast cancer    Breast Cancer Paternal Grandmother         66's    Asthma Child     Eczema Child        Social History:  Social History     Tobacco Use    Smoking status: Never    Smokeless tobacco: Never   Substance Use Topics    Alcohol use: Yes     Alcohol/week: 0.8 - 1.7 standard drinks     Types: 1 - 2 Glasses of wine per week     Comment: 1    Drug use: No       Allergies: Allergies   Allergen Reactions    Lisinopril Swelling    Morphine Other (comments)     Chest pain       CURRENT MEDICATIONS      Previous Medications    CYCLOBENZAPRINE (FLEXERIL) 10 MG TABLET    Take 1 Tab by mouth three (3) times daily as needed for Muscle Spasm(s).     DICLOFENAC EC (VOLTAREN) 75 MG EC TABLET    Take 1 Tab by mouth two (2) times daily as needed for Pain. DROSPIRENONE, CONTRACEPTIVE, (SLYND) 4 MG (28) TAB    Slynd 4 mg (28) tablet   TAKE 1 TABLET BY MOUTH EVERY DAY    MAGNESIUM 200 MG TAB    Take 1 Tablet by mouth daily. METHOCARBAMOL (ROBAXIN) 500 MG TABLET    Take 1 Tab by mouth three (3) times daily as needed for Muscle Spasm(s). PANTOPRAZOLE (PROTONIX) 40 MG TABLET    Protonix 40 mg tablet,delayed release   Take 1 tablet every day by oral route. PREDNISONE (DELTASONE) 10 MG TABLET    Please take 60mg x 2 days, 50mg x 2 days, 40mg x 2 days, 30mg x 2 days, 20mg x 2 days, 10mg x 2 days    RIZATRIPTAN (MAXALT) 10 MG TABLET    Take once daily as needed for migraine. SCREENINGS               No data recorded         PHYSICAL EXAM      ED Triage Vitals [01/07/23 1303]   ED Encounter Vitals Group      BP (!) 144/106      Pulse (Heart Rate) 94      Resp Rate 16      Temp 97.7 °F (36.5 °C)      Temp src       O2 Sat (%) 100 %      Weight 170 lb      Height 5' 2\"        Physical Exam  Vitals and nursing note reviewed. Constitutional:       General: She is not in acute distress. Appearance: She is well-developed. She is not ill-appearing. Cardiovascular:      Rate and Rhythm: Normal rate and regular rhythm. Heart sounds: No murmur heard. Pulmonary:      Effort: Pulmonary effort is normal. No respiratory distress. Breath sounds: Normal breath sounds. Chest:      Chest wall: Tenderness (There is reproducible chest wall TTP to the right upper chest wall which completely reproduces her pain without crepitus, step-off, deformity.) present. Abdominal:      General: Abdomen is flat. There is no distension. Palpations: Abdomen is soft. Tenderness: There is no abdominal tenderness. Neurological:      General: No focal deficit present. Mental Status: She is alert and oriented to person, place, and time.         DIAGNOSTIC RESULTS   LABS:     Recent Results (from the past 12 hour(s))   EKG, 12 LEAD, INITIAL    Collection Time: 01/07/23  1:09 PM   Result Value Ref Range    Ventricular Rate 82 BPM    Atrial Rate 82 BPM    P-R Interval 144 ms    QRS Duration 80 ms    Q-T Interval 356 ms    QTC Calculation (Bezet) 415 ms    Calculated P Axis 64 degrees    Calculated R Axis 54 degrees    Calculated T Axis 49 degrees    Diagnosis       ** Poor data quality, interpretation may be adversely affected  Normal sinus rhythm  Low voltage QRS  When compared with ECG of 25-DEC-2020 19:42,  No significant change was found     CBC WITH AUTOMATED DIFF    Collection Time: 01/07/23  1:16 PM   Result Value Ref Range    WBC 7.0 3.6 - 11.0 K/uL    RBC 4.67 3.80 - 5.20 M/uL    HGB 13.4 11.5 - 16.0 g/dL    HCT 40.9 35.0 - 47.0 %    MCV 87.6 80.0 - 99.0 FL    MCH 28.7 26.0 - 34.0 PG    MCHC 32.8 30.0 - 36.5 g/dL    RDW 12.2 11.5 - 14.5 %    PLATELET 799 105 - 087 K/uL    MPV 8.8 (L) 8.9 - 12.9 FL    NRBC 0.0 0  WBC    ABSOLUTE NRBC 0.00 0.00 - 0.01 K/uL    NEUTROPHILS 74 32 - 75 %    LYMPHOCYTES 17 12 - 49 %    MONOCYTES 8 5 - 13 %    EOSINOPHILS 1 0 - 7 %    BASOPHILS 0 0 - 1 %    IMMATURE GRANULOCYTES 0 0.0 - 0.5 %    ABS. NEUTROPHILS 5.1 1.8 - 8.0 K/UL    ABS. LYMPHOCYTES 1.2 0.8 - 3.5 K/UL    ABS. MONOCYTES 0.6 0.0 - 1.0 K/UL    ABS. EOSINOPHILS 0.1 0.0 - 0.4 K/UL    ABS. BASOPHILS 0.0 0.0 - 0.1 K/UL    ABS. IMM. GRANS. 0.0 0.00 - 0.04 K/UL    DF AUTOMATED     METABOLIC PANEL, COMPREHENSIVE    Collection Time: 01/07/23  1:16 PM   Result Value Ref Range    Sodium 138 136 - 145 mmol/L    Potassium 3.5 3.5 - 5.1 mmol/L    Chloride 103 97 - 108 mmol/L    CO2 30 21 - 32 mmol/L    Anion gap 5 5 - 15 mmol/L    Glucose 117 (H) 65 - 100 mg/dL    BUN 10 6 - 20 MG/DL    Creatinine 0.79 0.55 - 1.02 MG/DL    BUN/Creatinine ratio 13 12 - 20      eGFR >60 >60 ml/min/1.73m2    Calcium 8.9 8.5 - 10.1 MG/DL    Bilirubin, total 0.3 0.2 - 1.0 MG/DL    ALT (SGPT) 23 12 - 78 U/L    AST (SGOT) 14 (L) 15 - 37 U/L    Alk.  phosphatase 85 45 - 117 U/L    Protein, total 6.9 6.4 - 8.2 g/dL    Albumin 3.7 3.5 - 5.0 g/dL    Globulin 3.2 2.0 - 4.0 g/dL    A-G Ratio 1.2 1.1 - 2.2     NT-PRO BNP    Collection Time: 01/07/23  1:16 PM   Result Value Ref Range    NT pro-BNP 41 <125 PG/ML   TROPONIN-HIGH SENSITIVITY    Collection Time: 01/07/23  1:16 PM   Result Value Ref Range    Troponin-High Sensitivity <4 0 - 51 ng/L   HCG QL SERUM    Collection Time: 01/07/23  1:16 PM   Result Value Ref Range    HCG, Ql. Negative NEG          EKG: If performed, independent interpretation documented below in the MDM section     RADIOLOGY:  Non-plain film images such as CT, Ultrasound and MRI are read by the radiologist. Plain radiographic images are visualized and preliminarily interpreted by the ED Provider with the findings documented in the MDM section. Interpretation per the Radiologist below, if available at the time of this note:     XR CHEST PA LAT    Result Date: 1/7/2023  EXAM: XR CHEST PA LAT INDICATION: Chest pain NOS. COMPARISON: Chest views on 1/5/2021 and 3/14/2019. CT chest on 6/4/2017. TECHNIQUE: PA and lateral chest views FINDINGS: The cardiomediastinal and hilar contours are within normal limits. The pulmonary vasculature is within normal limits. The lungs and pleural spaces are clear. The visualized bones and upper abdomen are age-appropriate. Normal PA and lateral chest views. No change. CTA CHEST W OR W WO CONT    Result Date: 1/7/2023  EXAM:  CTA CHEST W OR W WO CONT INDICATION:  Right-sided pleuritic chest pain, shortness of breath COMPARISON: June 9941 TECHNIQUE: Helical thin section chest CT following uneventful intravenous administration of nonionic contrast according to departmental PE protocol. Coronal and sagittal reformats were performed. 3D/MIP post processing was performed. CT dose reduction was achieved through use of a standardized protocol tailored for this examination and automatic exposure control for dose modulation.  FINDINGS: This is a good quality study for the evaluation of pulmonary embolism to the first subsegmental arterial level. There is no pulmonary embolism to this level. Normal caliber thoracic aorta. Cardiac size is within normal limits. No pericardial effusion. No lymphadenopathy by imaging size criteria. The lungs are clear. No pleural effusion or pneumothorax. Central airways are unremarkable. Limited images of the upper abdomen are within normal limits. The bony structures are age-appropriate     No evidence of pulmonary embolism. Clear lungs. PROCEDURES   Unless otherwise noted below, none  Procedures     CRITICAL CARE TIME   0    EMERGENCY DEPARTMENT COURSE and DIFFERENTIAL DIAGNOSIS/MDM   Vitals:    Vitals:    01/07/23 1303 01/07/23 1329   BP: (!) 144/106    Pulse: 94    Resp: 16    Temp: 97.7 °F (36.5 °C)    SpO2: 100% 100%   Weight: 77.1 kg (170 lb)    Height: 5' 2\" (1.575 m)         Patient was given the following medications:  Medications   lidocaine 4 % patch 1 Patch (1 Patch TransDERmal Apply Patch 1/7/23 1407)   ketorolac (TORADOL) injection 15 mg (15 mg IntraVENous Given 1/7/23 1406)   fentaNYL citrate (PF) injection 50 mcg (50 mcg IntraVENous Given 1/7/23 1406)   iopamidoL (ISOVUE-370) 370 mg iodine /mL (76 %) injection 100 mL (100 mL IntraVENous Given 1/7/23 1419)       Medical Decision Making  Amount and/or Complexity of Data Reviewed  Labs: ordered. Decision-making details documented in ED Course. Radiology: ordered. Decision-making details documented in ED Course. ECG/medicine tests: ordered and independent interpretation performed. Decision-making details documented in ED Course. Risk  OTC drugs. Prescription drug management. This is a 43-year-old female with above history presenting to the emergency department for right upper chest pain which radiates straight through into the right scapula over the past 2 weeks progressively worsening. She is afebrile and vital signs are stable.   She does have pain that is completely reproduced with palpation of the right upper chest wall. Differential diagnosis includes chest wall pain, musculoskeletal pain, costochondritis, pneumonia, pneumothorax, ACS, PE, dissection. I will evaluate with CTPA, troponin, blood work, EKG, treat symptomatically with IV Toradol, IV fentanyl, and topical lidocaine patch and reassess. ED Course as of 01/07/23 1504   Sat Jan 07, 2023   1343 EKG per my interpretation normal sinus rhythm, rate 82 bpm, normal axis, no acute ischemic changes or interval changes. [AK]   1502 CTA negative for acute process. Troponin negative. Chest x-ray negative. No sign of PE or ACS. Symptoms appear mostly musculoskeletal at this time and costochondritis. Plan to treat with outpatient p.o. ibuprofen, acetaminophen, topical lidocaine patches. Encourage close follow-up with PCP and given strict return ED precautions. [AK]      ED Course User Index  [AK] Tawanna Cheney MD         FINAL IMPRESSION     1. Right-sided chest wall pain    2. Costochondritis          DISPOSITION/PLAN     Discharge Note:  The patient has been re-evaluated and is ready for discharge. Reviewed available results with patient. Counseled patient on diagnosis and care plan. Patient has expressed understanding, and all questions have been answered. Patient agrees with plan and agrees to follow up as recommended, or to return to the ED if their symptoms worsen. Discharge instructions have been provided and explained to the patient, along with reasons to return to the ED. CLINICAL IMPRESSION    1. Right-sided chest wall pain    2.  Costochondritis         DISPOSITION  discharge     PATIENT REFERRED TO:  Follow-up Information       Follow up With Specialties Details Why Contact Info    Akin Rehman MD Family Medicine Schedule an appointment as soon as possible for a visit   [address] [place] [address] [phone]      [facility] EMERGENCY DEPT Emergency Medicine Go to  As needed, If symptoms worsen [address] [place] [address]  [phone]              DISCHARGE MEDICATIONS:  Current Discharge Medication List        START taking these medications    Details   ibuprofen (MOTRIN) 600 mg tablet Take 1 Tablet by mouth every six (6) hours as needed for Pain. Qty: 20 Tablet, Refills: 0  Start date: 1/7/2023      lidocaine 4 % patch Apply every 12 hours as needed for pain. Qty: 10 Patch, Refills: 0  Start date: 1/7/2023               DISCONTINUED MEDICATIONS:  Current Discharge Medication List          I am the Primary Clinician of Record. Shawn Escalona MD (electronically signed)    (Please note that parts of this dictation were completed with voice recognition software. Quite often unanticipated grammatical, syntax, homophones, and other interpretive errors are inadvertently transcribed by the computer software. Please disregards these errors.  Please excuse any errors that have escaped final proofreading.)

## 2023-01-07 NOTE — ED TRIAGE NOTES
Pt arrives to ed w reports of cp radiating to between scapula onset 1 wk ago. Feels she cannot get a deep breath. Hx a fib w 2 ablations. Reports R face/ear/neck pain onset this AM. Sent as PE ruleout.

## 2023-01-07 NOTE — ED NOTES
Rt shoulder pain into the back, dry cough for a few days getting worse.  Hurts to take a deep breath

## 2023-06-02 ENCOUNTER — TRANSCRIBE ORDERS (OUTPATIENT)
Facility: HOSPITAL | Age: 43
End: 2023-06-02

## 2023-06-02 DIAGNOSIS — Z12.31 VISIT FOR SCREENING MAMMOGRAM: Primary | ICD-10-CM

## 2023-06-12 RX ORDER — RIZATRIPTAN BENZOATE 10 MG/1
TABLET ORAL
Qty: 9 TABLET | OUTPATIENT
Start: 2023-06-12

## 2023-06-26 ENCOUNTER — OFFICE VISIT (OUTPATIENT)
Age: 43
End: 2023-06-26
Payer: COMMERCIAL

## 2023-06-26 VITALS
TEMPERATURE: 98.2 F | RESPIRATION RATE: 18 BRPM | BODY MASS INDEX: 32.97 KG/M2 | WEIGHT: 179.2 LBS | SYSTOLIC BLOOD PRESSURE: 121 MMHG | OXYGEN SATURATION: 97 % | HEART RATE: 94 BPM | HEIGHT: 62 IN | DIASTOLIC BLOOD PRESSURE: 84 MMHG

## 2023-06-26 DIAGNOSIS — Q79.60 EHLERS-DANLOS SYNDROME: Primary | ICD-10-CM

## 2023-06-26 DIAGNOSIS — Z00.00 ROUTINE GENERAL MEDICAL EXAMINATION AT A HEALTH CARE FACILITY: ICD-10-CM

## 2023-06-26 PROCEDURE — 99396 PREV VISIT EST AGE 40-64: CPT | Performed by: FAMILY MEDICINE

## 2023-06-26 RX ORDER — RIZATRIPTAN BENZOATE 10 MG/1
10 TABLET ORAL
Qty: 9 TABLET | Refills: 11 | Status: SHIPPED | OUTPATIENT
Start: 2023-06-26 | End: 2023-06-26

## 2023-06-26 RX ORDER — TRIMETHOPRIM 100 MG/1
100 TABLET ORAL NIGHTLY
COMMUNITY
Start: 2023-06-02

## 2023-06-26 RX ORDER — METOPROLOL TARTRATE 50 MG/1
25 TABLET, FILM COATED ORAL AS NEEDED
COMMUNITY
Start: 2023-06-11

## 2023-06-26 SDOH — ECONOMIC STABILITY: FOOD INSECURITY: WITHIN THE PAST 12 MONTHS, YOU WORRIED THAT YOUR FOOD WOULD RUN OUT BEFORE YOU GOT MONEY TO BUY MORE.: NEVER TRUE

## 2023-06-26 SDOH — ECONOMIC STABILITY: FOOD INSECURITY: WITHIN THE PAST 12 MONTHS, THE FOOD YOU BOUGHT JUST DIDN'T LAST AND YOU DIDN'T HAVE MONEY TO GET MORE.: NEVER TRUE

## 2023-06-26 SDOH — ECONOMIC STABILITY: INCOME INSECURITY: HOW HARD IS IT FOR YOU TO PAY FOR THE VERY BASICS LIKE FOOD, HOUSING, MEDICAL CARE, AND HEATING?: NOT HARD AT ALL

## 2023-06-26 SDOH — ECONOMIC STABILITY: HOUSING INSECURITY
IN THE LAST 12 MONTHS, WAS THERE A TIME WHEN YOU DID NOT HAVE A STEADY PLACE TO SLEEP OR SLEPT IN A SHELTER (INCLUDING NOW)?: NO

## 2023-06-26 ASSESSMENT — PATIENT HEALTH QUESTIONNAIRE - PHQ9
1. LITTLE INTEREST OR PLEASURE IN DOING THINGS: 0
SUM OF ALL RESPONSES TO PHQ QUESTIONS 1-9: 0
2. FEELING DOWN, DEPRESSED OR HOPELESS: 0
SUM OF ALL RESPONSES TO PHQ9 QUESTIONS 1 & 2: 0
SUM OF ALL RESPONSES TO PHQ QUESTIONS 1-9: 0

## 2023-06-27 LAB
25(OH)D3+25(OH)D2 SERPL-MCNC: 33.9 NG/ML (ref 30–100)
ALBUMIN SERPL-MCNC: 4.6 G/DL (ref 3.8–4.8)
ALBUMIN/GLOB SERPL: 2 {RATIO} (ref 1.2–2.2)
ALP SERPL-CCNC: 90 IU/L (ref 44–121)
ALT SERPL-CCNC: 15 IU/L (ref 0–32)
AST SERPL-CCNC: 14 IU/L (ref 0–40)
BASOPHILS # BLD AUTO: 0 X10E3/UL (ref 0–0.2)
BASOPHILS NFR BLD AUTO: 1 %
BILIRUB SERPL-MCNC: <0.2 MG/DL (ref 0–1.2)
BUN SERPL-MCNC: 15 MG/DL (ref 6–24)
BUN/CREAT SERPL: 17 (ref 9–23)
CALCIUM SERPL-MCNC: 9.6 MG/DL (ref 8.7–10.2)
CHLORIDE SERPL-SCNC: 102 MMOL/L (ref 96–106)
CHOLEST SERPL-MCNC: 228 MG/DL (ref 100–199)
CO2 SERPL-SCNC: 20 MMOL/L (ref 20–29)
CREAT SERPL-MCNC: 0.86 MG/DL (ref 0.57–1)
EGFRCR SERPLBLD CKD-EPI 2021: 87 ML/MIN/1.73
EOSINOPHIL # BLD AUTO: 0.1 X10E3/UL (ref 0–0.4)
EOSINOPHIL NFR BLD AUTO: 1 %
ERYTHROCYTE [DISTWIDTH] IN BLOOD BY AUTOMATED COUNT: 12.2 % (ref 11.7–15.4)
GLOBULIN SER CALC-MCNC: 2.3 G/DL (ref 1.5–4.5)
GLUCOSE SERPL-MCNC: 84 MG/DL (ref 70–99)
HBA1C MFR BLD: 5 % (ref 4.8–5.6)
HCT VFR BLD AUTO: 39.6 % (ref 34–46.6)
HCV IGG SERPL QL IA: NON REACTIVE
HDLC SERPL-MCNC: 62 MG/DL
HGB BLD-MCNC: 13.1 G/DL (ref 11.1–15.9)
IMM GRANULOCYTES # BLD AUTO: 0 X10E3/UL (ref 0–0.1)
IMM GRANULOCYTES NFR BLD AUTO: 0 %
IMP & REVIEW OF LAB RESULTS: NORMAL
LDLC SERPL CALC-MCNC: 148 MG/DL (ref 0–99)
LYMPHOCYTES # BLD AUTO: 1.4 X10E3/UL (ref 0.7–3.1)
LYMPHOCYTES NFR BLD AUTO: 24 %
MCH RBC QN AUTO: 28.5 PG (ref 26.6–33)
MCHC RBC AUTO-ENTMCNC: 33.1 G/DL (ref 31.5–35.7)
MCV RBC AUTO: 86 FL (ref 79–97)
MONOCYTES # BLD AUTO: 0.5 X10E3/UL (ref 0.1–0.9)
MONOCYTES NFR BLD AUTO: 9 %
NEUTROPHILS # BLD AUTO: 3.6 X10E3/UL (ref 1.4–7)
NEUTROPHILS NFR BLD AUTO: 65 %
PLATELET # BLD AUTO: 244 X10E3/UL (ref 150–450)
POTASSIUM SERPL-SCNC: 4.3 MMOL/L (ref 3.5–5.2)
PROT SERPL-MCNC: 6.9 G/DL (ref 6–8.5)
RBC # BLD AUTO: 4.59 X10E6/UL (ref 3.77–5.28)
SODIUM SERPL-SCNC: 142 MMOL/L (ref 134–144)
TRIGL SERPL-MCNC: 104 MG/DL (ref 0–149)
TSH SERPL DL<=0.005 MIU/L-ACNC: 0.88 UIU/ML (ref 0.45–4.5)
VLDLC SERPL CALC-MCNC: 18 MG/DL (ref 5–40)
WBC # BLD AUTO: 5.5 X10E3/UL (ref 3.4–10.8)

## 2024-08-26 RX ORDER — RIZATRIPTAN BENZOATE 10 MG/1
TABLET ORAL
Qty: 9 TABLET | Refills: 1 | Status: SHIPPED | OUTPATIENT
Start: 2024-08-26

## 2024-08-28 ENCOUNTER — TELEPHONE (OUTPATIENT)
Age: 44
End: 2024-08-28

## 2024-08-28 NOTE — TELEPHONE ENCOUNTER
PA denied for Maxalt. Suggested alternative, SUMATRIPTANT Tab/mg, ELETRIPTANT Tab/mg, NARATRIPTANT Tab/mg, ZOLMITRIPTANT tab/mg, FROVATRIPTANT tab/mg.

## 2024-08-28 NOTE — TELEPHONE ENCOUNTER
[facility] pharmacy sent fax regarding rizatriptan medication. Message from pharmacy: \"drug not covered by patient plan. The preferred alternative is SUMATRIPTANT Tab/mg, ELETRIPTANT Tab/mg, NARATRIPTANT Tab/mg, ZOLMITRIPTANT tab/mg, FROVATRIPTANT tab/mg. Please call/fax/e-scribe the pharmacy to change medication along with strength, directions, quantity, and refills.

## 2024-10-18 ENCOUNTER — HOSPITAL ENCOUNTER (OUTPATIENT)
Facility: HOSPITAL | Age: 44
Discharge: HOME | End: 2024-10-21
Payer: COMMERCIAL

## 2024-10-18 DIAGNOSIS — Z12.31 VISIT FOR SCREENING MAMMOGRAM: ICD-10-CM

## 2024-10-18 PROCEDURE — 77063 BREAST TOMOSYNTHESIS BI: CPT

## 2025-03-31 ENCOUNTER — OFFICE VISIT (OUTPATIENT)
Age: 45
End: 2025-03-31
Payer: COMMERCIAL

## 2025-03-31 VITALS
OXYGEN SATURATION: 97 % | RESPIRATION RATE: 16 BRPM | WEIGHT: 182.2 LBS | HEIGHT: 62 IN | DIASTOLIC BLOOD PRESSURE: 85 MMHG | SYSTOLIC BLOOD PRESSURE: 125 MMHG | HEART RATE: 80 BPM | BODY MASS INDEX: 33.53 KG/M2

## 2025-03-31 DIAGNOSIS — M79.10 MYALGIA: ICD-10-CM

## 2025-03-31 DIAGNOSIS — Q79.60 EHLERS-DANLOS SYNDROME: Primary | ICD-10-CM

## 2025-03-31 DIAGNOSIS — M25.50 ARTHRALGIA, UNSPECIFIED JOINT: ICD-10-CM

## 2025-03-31 DIAGNOSIS — E78.00 ELEVATED CHOLESTEROL: ICD-10-CM

## 2025-03-31 PROCEDURE — 99214 OFFICE O/P EST MOD 30 MIN: CPT | Performed by: FAMILY MEDICINE

## 2025-03-31 RX ORDER — OMEPRAZOLE 40 MG/1
40 CAPSULE, DELAYED RELEASE ORAL
COMMUNITY
Start: 2025-03-31

## 2025-03-31 RX ORDER — LINACLOTIDE 290 UG/1
CAPSULE, GELATIN COATED ORAL
COMMUNITY

## 2025-03-31 SDOH — ECONOMIC STABILITY: FOOD INSECURITY: WITHIN THE PAST 12 MONTHS, YOU WORRIED THAT YOUR FOOD WOULD RUN OUT BEFORE YOU GOT MONEY TO BUY MORE.: NEVER TRUE

## 2025-03-31 SDOH — ECONOMIC STABILITY: TRANSPORTATION INSECURITY
IN THE PAST 12 MONTHS, HAS LACK OF TRANSPORTATION KEPT YOU FROM MEETINGS, WORK, OR FROM GETTING THINGS NEEDED FOR DAILY LIVING?: NO

## 2025-03-31 SDOH — ECONOMIC STABILITY: INCOME INSECURITY: IN THE LAST 12 MONTHS, WAS THERE A TIME WHEN YOU WERE NOT ABLE TO PAY THE MORTGAGE OR RENT ON TIME?: NO

## 2025-03-31 SDOH — ECONOMIC STABILITY: FOOD INSECURITY: WITHIN THE PAST 12 MONTHS, THE FOOD YOU BOUGHT JUST DIDN'T LAST AND YOU DIDN'T HAVE MONEY TO GET MORE.: NEVER TRUE

## 2025-03-31 SDOH — ECONOMIC STABILITY: TRANSPORTATION INSECURITY
IN THE PAST 12 MONTHS, HAS THE LACK OF TRANSPORTATION KEPT YOU FROM MEDICAL APPOINTMENTS OR FROM GETTING MEDICATIONS?: NO

## 2025-03-31 ASSESSMENT — PATIENT HEALTH QUESTIONNAIRE - PHQ9
SUM OF ALL RESPONSES TO PHQ QUESTIONS 1-9: 0
SUM OF ALL RESPONSES TO PHQ QUESTIONS 1-9: 0
2. FEELING DOWN, DEPRESSED OR HOPELESS: NOT AT ALL
1. LITTLE INTEREST OR PLEASURE IN DOING THINGS: NOT AT ALL
SUM OF ALL RESPONSES TO PHQ QUESTIONS 1-9: 0
SUM OF ALL RESPONSES TO PHQ QUESTIONS 1-9: 0

## 2025-03-31 NOTE — PROGRESS NOTES
Chief Complaint   Patient presents with    Lab Collection    Weight Gain    Joint Pain     Pt has a history of EDS, has been experiencing more joint pain over the past year, wakes her up from sleep, affecting day to day life.     Pt has been trying to walk more, concerned about weight gain, but this increases pain.     Pt has noticed more generalized swelling.     Pt has IBS, taking Linzess, not working as well as it used to, still having constipation. Pt has to do enemas occ. Pt is also taking senna.     Pt has been on multiple medications from pulmonology to help with frequent throat clearing. Nothing helped, pt is seeing a new ENT in a few weeks.  Treated with gabapentin for possible nerve issue, then sinus, then allergy, nothing helped.     Pt denies depression, happy with life, family, work, but physical symptoms are taking a toll.     Wt Readings from Last 3 Encounters:   03/31/25 82.6 kg (182 lb 3.2 oz)   06/26/23 81.3 kg (179 lb 3.2 oz)     Pt has made diet changes, eating lean proteins, minimizing carbs.     Subjective: (As above and below)     Chief Complaint   Patient presents with    Lab Collection    Weight Gain    Joint Pain     she is a 44 y.o. year old female who presents for evaluation.    Reviewed PmHx, RxHx, FmHx, SocHx, AllgHx and updated in chart.    Review of Systems - negative except as listed above    Objective:     Vitals:    03/31/25 0726   BP: 125/85   BP Site: Right Upper Arm   Patient Position: Sitting   BP Cuff Size: Medium Adult   Pulse: 80   Resp: 16   SpO2: 97%   Weight: 82.6 kg (182 lb 3.2 oz)   Height: 1.575 m (5' 2\")     Physical Examination: General appearance - alert, well appearing, and in no distress  Mental status - alert, oriented to person, place, and time  Ears - bilateral TM's and external ear canals normal  Chest - clear to auscultation, no wheezes, rales or rhonchi, symmetric air entry  Heart - normal rate, regular rhythm, normal S1, S2, no murmurs, rubs, clicks or

## 2025-03-31 NOTE — PROGRESS NOTES
Chief Complaint   Patient presents with    Lab Collection    Weight Gain    Joint Pain         Health Maintenance Due   Topic Date Due    Varicella vaccine (1 of 2 - 13+ 2-dose series) Never done    Hepatitis B vaccine (1 of 3 - 19+ 3-dose series) Never done    Cervical cancer screen  Never done    Depression Screen  06/26/2024    Flu vaccine (1) 08/01/2024    COVID-19 Vaccine (1 - 2024-25 season) Never done         \"Have you been to the ER, urgent care clinic since your last visit?  Hospitalized since your last visit?\"    Yes, [place]  [address] for right shoulder pain     “Have you seen or consulted any other health care providers outside of [facility] since your last visit?”    Yes, ENT and pulmonology         “Have you had a pap smear?”    NO hysterectomy      No cervical cancer screening on file

## 2025-04-01 ENCOUNTER — RESULTS FOLLOW-UP (OUTPATIENT)
Age: 45
End: 2025-04-01

## 2025-04-01 LAB
25(OH)D3+25(OH)D2 SERPL-MCNC: 27.6 NG/ML (ref 30–100)
ALBUMIN SERPL-MCNC: 4.7 G/DL (ref 3.9–4.9)
ALP SERPL-CCNC: 81 IU/L (ref 44–121)
ALT SERPL-CCNC: 11 IU/L (ref 0–32)
AST SERPL-CCNC: 15 IU/L (ref 0–40)
BASOPHILS # BLD AUTO: 0 X10E3/UL (ref 0–0.2)
BASOPHILS NFR BLD AUTO: 1 %
BILIRUB SERPL-MCNC: 0.3 MG/DL (ref 0–1.2)
BUN SERPL-MCNC: 13 MG/DL (ref 6–24)
BUN/CREAT SERPL: 15 (ref 9–23)
CALCIUM SERPL-MCNC: 9.4 MG/DL (ref 8.7–10.2)
CHLORIDE SERPL-SCNC: 102 MMOL/L (ref 96–106)
CHOLEST SERPL-MCNC: 228 MG/DL (ref 100–199)
CO2 SERPL-SCNC: 18 MMOL/L (ref 20–29)
CREAT SERPL-MCNC: 0.87 MG/DL (ref 0.57–1)
EGFRCR SERPLBLD CKD-EPI 2021: 85 ML/MIN/1.73
EOSINOPHIL # BLD AUTO: 0.1 X10E3/UL (ref 0–0.4)
EOSINOPHIL NFR BLD AUTO: 2 %
ERYTHROCYTE [DISTWIDTH] IN BLOOD BY AUTOMATED COUNT: 12 % (ref 11.7–15.4)
ERYTHROCYTE [SEDIMENTATION RATE] IN BLOOD BY WESTERGREN METHOD: 17 MM/HR (ref 0–32)
GLOBULIN SER CALC-MCNC: 2.3 G/DL (ref 1.5–4.5)
GLUCOSE SERPL-MCNC: 85 MG/DL (ref 70–99)
HBA1C MFR BLD: 5.3 % (ref 4.8–5.6)
HCT VFR BLD AUTO: 44.4 % (ref 34–46.6)
HDLC SERPL-MCNC: 59 MG/DL
HGB BLD-MCNC: 14.5 G/DL (ref 11.1–15.9)
IMM GRANULOCYTES # BLD AUTO: 0 X10E3/UL (ref 0–0.1)
IMM GRANULOCYTES NFR BLD AUTO: 0 %
IMP & REVIEW OF LAB RESULTS: NORMAL
LDLC SERPL CALC-MCNC: 155 MG/DL (ref 0–99)
LYMPHOCYTES # BLD AUTO: 1.4 X10E3/UL (ref 0.7–3.1)
LYMPHOCYTES NFR BLD AUTO: 32 %
MCH RBC QN AUTO: 29.2 PG (ref 26.6–33)
MCHC RBC AUTO-ENTMCNC: 32.7 G/DL (ref 31.5–35.7)
MCV RBC AUTO: 89 FL (ref 79–97)
MONOCYTES # BLD AUTO: 0.3 X10E3/UL (ref 0.1–0.9)
MONOCYTES NFR BLD AUTO: 8 %
NEUTROPHILS # BLD AUTO: 2.5 X10E3/UL (ref 1.4–7)
NEUTROPHILS NFR BLD AUTO: 57 %
PLATELET # BLD AUTO: 226 X10E3/UL (ref 150–450)
POTASSIUM SERPL-SCNC: 3.9 MMOL/L (ref 3.5–5.2)
PROT SERPL-MCNC: 7 G/DL (ref 6–8.5)
RBC # BLD AUTO: 4.97 X10E6/UL (ref 3.77–5.28)
SODIUM SERPL-SCNC: 139 MMOL/L (ref 134–144)
SPECIMEN STATUS REPORT: NORMAL
TRIGL SERPL-MCNC: 80 MG/DL (ref 0–149)
TSH SERPL DL<=0.005 MIU/L-ACNC: 0.92 UIU/ML (ref 0.45–4.5)
VLDLC SERPL CALC-MCNC: 14 MG/DL (ref 5–40)
WBC # BLD AUTO: 4.3 X10E3/UL (ref 3.4–10.8)

## 2025-04-02 LAB — CRP SERPL-MCNC: <1 MG/L (ref 0–10)

## 2025-05-19 ENCOUNTER — TRANSCRIBE ORDERS (OUTPATIENT)
Facility: HOSPITAL | Age: 45
End: 2025-05-19

## 2025-05-19 DIAGNOSIS — R13.13 DYSPHAGIA, CRICOPHARYNGEAL: Primary | ICD-10-CM

## 2025-06-16 ENCOUNTER — HOSPITAL ENCOUNTER (OUTPATIENT)
Facility: HOSPITAL | Age: 45
Discharge: HOME | End: 2025-06-19
Attending: OTOLARYNGOLOGY
Payer: COMMERCIAL

## 2025-06-16 DIAGNOSIS — R13.13 DYSPHAGIA, CRICOPHARYNGEAL: ICD-10-CM

## 2025-06-16 PROCEDURE — 74220 X-RAY XM ESOPHAGUS 1CNTRST: CPT

## 2025-07-22 ENCOUNTER — OFFICE VISIT (OUTPATIENT)
Age: 45
End: 2025-07-22
Payer: COMMERCIAL

## 2025-07-22 VITALS
HEART RATE: 82 BPM | RESPIRATION RATE: 16 BRPM | HEIGHT: 62 IN | SYSTOLIC BLOOD PRESSURE: 122 MMHG | OXYGEN SATURATION: 98 % | DIASTOLIC BLOOD PRESSURE: 82 MMHG | WEIGHT: 182.4 LBS | BODY MASS INDEX: 33.57 KG/M2

## 2025-07-22 DIAGNOSIS — F41.9 ANXIETY: ICD-10-CM

## 2025-07-22 DIAGNOSIS — F43.0 ACUTE STRESS REACTION: Primary | ICD-10-CM

## 2025-07-22 PROCEDURE — 99214 OFFICE O/P EST MOD 30 MIN: CPT | Performed by: FAMILY MEDICINE

## 2025-07-22 ASSESSMENT — PATIENT HEALTH QUESTIONNAIRE - PHQ9
2. FEELING DOWN, DEPRESSED OR HOPELESS: SEVERAL DAYS
1. LITTLE INTEREST OR PLEASURE IN DOING THINGS: SEVERAL DAYS
SUM OF ALL RESPONSES TO PHQ QUESTIONS 1-9: 2

## 2025-07-22 NOTE — PROGRESS NOTES
Chief Complaint   Patient presents with    Anxiety         Health Maintenance Due   Topic Date Due    Varicella vaccine (1 of 2 - 13+ 2-dose series) Never done    Hepatitis B vaccine (1 of 3 - 19+ 3-dose series) Never done    COVID-19 Vaccine (1 - 2024-25 season) Never done         \"Have you been to the ER, urgent care clinic since your last visit?  Hospitalized since your last visit?\"    NO    “Have you seen or consulted any other health care providers outside of [facility] since your last visit?”    NO

## 2025-07-22 NOTE — PATIENT INSTRUCTIONS
Below are options for counseling/therapy:    [facility]   Kyra Nash LPC   [address]   [address]   [place] [address]   p [phone]   [email or web address]     [facility]   [address]   [place] [address]   p [phone] or      [phone]   Email: [email or web address]     [facility]   [address] [place] [address]   p [phone]   [email or web address]     [facility]   [address] [place] [address]   p [phone]   [email or web address]     [facility]   [address]   [address]   [place] [address]   p [phone]   f [phone]     [facility]   [address]   [place] [address]   In the [facility]   p [phone]   [email or web address]     [facility] [place] Location   [address] [place] [address]   p [phone]     [facility] - [place] Location   [address] [place] [address]   p [phone]

## 2025-07-25 NOTE — PROGRESS NOTES
Subjective: (As above and below)     Chief Complaint   Patient presents with    Anxiety    Pain     she is a 44 y.o. year old female who presents for evaluation.  History of Present Illness  The patient presents for evaluation of anxiety.    She reports feeling better today compared to the past few days. She is currently seeking counseling and requires additional support to manage her daily life. She is experiencing significant stress due to ongoing divorce discussions with her , which have also led to her eldest daughter's departure from home. This situation has caused her to feel overwhelmed, leading to episodes of breathlessness and insomnia. She has recently resumed eating but continues to struggle with functioning normally. She describes her current state as akin to grief, similar to what she felt after her mother's death. She feels a sense of failure, particularly in her role as a mother, and is unsure how to handle her daughter's sudden change in behavior. She is mentally exhausted and sleep-deprived, often shaking her legs in bed. She also experiences a constant feeling of impending doom, likening it to being at the top of a roller coaster about to drop. She is uncertain whether her physical pain is due to anxiety or other causes. She has a strong support network among her friends, who have noticed her increased quietness and have been providing emotional support. She has been given the contact information of a counselor by one of her friends. She reports no thoughts of self-harm or harm to others, and no concerns about potential violence between her and her . She has previously taken Lexapro and sertraline for anxiety, both of which were effective.    Marital Status:   Sleep: Reports sleep deprivation and difficulty sleeping    Reviewed PmHx, RxHx, FmHx, SocHx, AllgHx and updated in chart.    Review of Systems - negative except as listed above    Objective:     Vitals:    07/22/25 1615

## 2025-08-11 RX ORDER — HYDROXYZINE PAMOATE 25 MG/1
25 CAPSULE ORAL DAILY PRN
Qty: 30 CAPSULE | Refills: 1 | Status: SHIPPED | OUTPATIENT
Start: 2025-08-11

## 2025-09-04 RX ORDER — HYDROXYZINE PAMOATE 25 MG/1
CAPSULE ORAL
Qty: 90 CAPSULE | Refills: 1 | Status: SHIPPED | OUTPATIENT
Start: 2025-09-04

## 2025-09-04 RX ORDER — RIZATRIPTAN BENZOATE 10 MG/1
TABLET ORAL
Qty: 9 TABLET | Refills: 1 | Status: SHIPPED | OUTPATIENT
Start: 2025-09-04

## (undated) DEVICE — TUBING HYDR IRR --

## (undated) DEVICE — Z DISCONTINUED NO SUB IDED SET EXTN W/ 4 W STPCOCK M SPIN LOK 36IN

## (undated) DEVICE — CANN NASAL O2 CAPNOGRAPHY AD -- FILTERLINE

## (undated) DEVICE — ESOPHAGEAL BALLOON DILATATION CATHETER: Brand: CRE FIXED WIRE

## (undated) DEVICE — CATH IV AUTOGRD BC BLU 22GA 25 -- INSYTE

## (undated) DEVICE — 1200 GUARD II KIT W/5MM TUBE W/O VAC TUBE: Brand: GUARDIAN

## (undated) DEVICE — Device

## (undated) DEVICE — BAG SPEC BIOHZD LF 2MIL 6X10IN -- CONVERT TO ITEM 357326

## (undated) DEVICE — CONTAINER SPEC 20 ML LID NEUT BUFF FORMALIN 10 % POLYPR STS

## (undated) DEVICE — SOLIDIFIER FLUID 3000 CC ABSORB

## (undated) DEVICE — NEEDLE HYPO 18GA L1.5IN PNK S STL HUB POLYPR SHLD REG BVL

## (undated) DEVICE — FORCEPS BX L240CM JAW DIA2.8MM L CAP W/ NDL MIC MESH TOOTH

## (undated) DEVICE — BW-412T DISP COMBO CLEANING BRUSH: Brand: SINGLE USE COMBINATION CLEANING BRUSH

## (undated) DEVICE — WRISTBAND ID AD W1XL11.5IN RED POLY ALRG PREPRINTED PERM

## (undated) DEVICE — NEONATAL-ADULT SPO2 SENSOR: Brand: NELLCOR

## (undated) DEVICE — SYRINGE MED 20ML STD CLR PLAS LUERLOCK TIP N CTRL DISP

## (undated) DEVICE — Device: Brand: MEDICAL ACTION INDUSTRIES

## (undated) DEVICE — BAG BELONG PT PERS CLEAR HANDL

## (undated) DEVICE — SYR ASSEMB INFL BLLN 60ML --

## (undated) DEVICE — SET ADMIN 16ML TBNG L100IN 2 Y INJ SITE IV PIGGY BK DISP

## (undated) DEVICE — KENDALL RADIOLUCENT FOAM MONITORING ELECTRODE -RECTANGULAR SHAPE: Brand: KENDALL

## (undated) DEVICE — ENDO CARRY-ON PROCEDURE KIT INCLUDES ENZYMATIC SPONGE, GAUZE, BIOHAZARD LABEL, TRAY, LUBRICANT, DIRTY SCOPE LABEL, WATER LABEL, TRAY, DRAWSTRING PAD, AND DEFENDO 4-PIECE KIT.: Brand: ENDO CARRY-ON PROCEDURE KIT